# Patient Record
Sex: FEMALE | Race: BLACK OR AFRICAN AMERICAN | NOT HISPANIC OR LATINO
[De-identification: names, ages, dates, MRNs, and addresses within clinical notes are randomized per-mention and may not be internally consistent; named-entity substitution may affect disease eponyms.]

---

## 2017-03-29 ENCOUNTER — APPOINTMENT (OUTPATIENT)
Dept: FAMILY MEDICINE | Facility: CLINIC | Age: 82
End: 2017-03-29

## 2017-04-23 ENCOUNTER — RX RENEWAL (OUTPATIENT)
Age: 82
End: 2017-04-23

## 2017-07-17 ENCOUNTER — APPOINTMENT (OUTPATIENT)
Dept: FAMILY MEDICINE | Facility: CLINIC | Age: 82
End: 2017-07-17

## 2017-07-17 ENCOUNTER — MEDICATION RENEWAL (OUTPATIENT)
Age: 82
End: 2017-07-17

## 2017-07-24 ENCOUNTER — MEDICATION RENEWAL (OUTPATIENT)
Age: 82
End: 2017-07-24

## 2017-07-25 ENCOUNTER — RX RENEWAL (OUTPATIENT)
Age: 82
End: 2017-07-25

## 2017-08-01 ENCOUNTER — APPOINTMENT (OUTPATIENT)
Dept: FAMILY MEDICINE | Facility: CLINIC | Age: 82
End: 2017-08-01
Payer: MEDICARE

## 2017-08-01 VITALS
BODY MASS INDEX: 41.65 KG/M2 | WEIGHT: 250 LBS | HEIGHT: 65 IN | DIASTOLIC BLOOD PRESSURE: 74 MMHG | TEMPERATURE: 98.4 F | OXYGEN SATURATION: 93 % | SYSTOLIC BLOOD PRESSURE: 148 MMHG | HEART RATE: 65 BPM

## 2017-08-01 DIAGNOSIS — C50.911 MALIGNANT NEOPLASM OF UNSPECIFIED SITE OF RIGHT FEMALE BREAST: ICD-10-CM

## 2017-08-01 DIAGNOSIS — I10 ESSENTIAL (PRIMARY) HYPERTENSION: ICD-10-CM

## 2017-08-01 DIAGNOSIS — E11.9 TYPE 2 DIABETES MELLITUS W/OUT COMPLICATIONS: ICD-10-CM

## 2017-08-01 DIAGNOSIS — I51.9 HEART DISEASE, UNSPECIFIED: ICD-10-CM

## 2017-08-01 DIAGNOSIS — I25.10 ATHEROSCLEROTIC HEART DISEASE OF NATIVE CORONARY ARTERY W/OUT ANGINA PECTORIS: ICD-10-CM

## 2017-08-01 PROCEDURE — 99215 OFFICE O/P EST HI 40 MIN: CPT | Mod: 25

## 2017-08-01 PROCEDURE — 36415 COLL VENOUS BLD VENIPUNCTURE: CPT

## 2017-08-01 RX ORDER — ASPIRIN 325 MG/1
325 TABLET, FILM COATED ORAL
Qty: 90 | Refills: 3 | Status: ACTIVE | COMMUNITY
Start: 2017-08-01

## 2017-08-02 LAB
25(OH)D3 SERPL-MCNC: 10.6 NG/ML
ALBUMIN SERPL ELPH-MCNC: 4 G/DL
ALP BLD-CCNC: 67 U/L
ALT SERPL-CCNC: 16 U/L
ANION GAP SERPL CALC-SCNC: 17 MMOL/L
APPEARANCE: ABNORMAL
AST SERPL-CCNC: 30 U/L
BASOPHILS # BLD AUTO: 0.02 K/UL
BASOPHILS NFR BLD AUTO: 0.3 %
BILIRUB DIRECT SERPL-MCNC: 0.1 MG/DL
BILIRUB INDIRECT SERPL-MCNC: 0.2 MG/DL
BILIRUB SERPL-MCNC: 0.3 MG/DL
BILIRUBIN URINE: NEGATIVE
BLOOD URINE: NEGATIVE
BUN SERPL-MCNC: 13 MG/DL
CALCIUM SERPL-MCNC: 9.6 MG/DL
CHLORIDE SERPL-SCNC: 103 MMOL/L
CHOLEST SERPL-MCNC: 189 MG/DL
CHOLEST/HDLC SERPL: 4.8 RATIO
CO2 SERPL-SCNC: 21 MMOL/L
COLOR: YELLOW
CREAT SERPL-MCNC: 1.69 MG/DL
EOSINOPHIL # BLD AUTO: 0.09 K/UL
EOSINOPHIL NFR BLD AUTO: 1.3 %
FOLATE SERPL-MCNC: 14.9 NG/ML
GLUCOSE QUALITATIVE U: NORMAL MG/DL
GLUCOSE SERPL-MCNC: 107 MG/DL
HAV IGM SER QL: NONREACTIVE
HBA1C MFR BLD HPLC: 6.1 %
HBV CORE IGM SER QL: NONREACTIVE
HBV SURFACE AG SER QL: NONREACTIVE
HCT VFR BLD CALC: 37.9 %
HCV AB SER QL: NONREACTIVE
HCV S/CO RATIO: 0.1 S/CO
HDLC SERPL-MCNC: 39 MG/DL
HGB BLD-MCNC: 12.5 G/DL
IMM GRANULOCYTES NFR BLD AUTO: 0.1 %
KETONES URINE: NEGATIVE
LDLC SERPL CALC-MCNC: 104 MG/DL
LEUKOCYTE ESTERASE URINE: ABNORMAL
LYMPHOCYTES # BLD AUTO: 4.77 K/UL
LYMPHOCYTES NFR BLD AUTO: 68.7 %
MAN DIFF?: NORMAL
MCHC RBC-ENTMCNC: 29.3 PG
MCHC RBC-ENTMCNC: 33 GM/DL
MCV RBC AUTO: 88.8 FL
MONOCYTES # BLD AUTO: 0.45 K/UL
MONOCYTES NFR BLD AUTO: 6.5 %
NEUTROPHILS # BLD AUTO: 1.6 K/UL
NEUTROPHILS NFR BLD AUTO: 23.1 %
NITRITE URINE: NEGATIVE
NT-PROBNP SERPL-MCNC: 118 PG/ML
PH URINE: 5
PLATELET # BLD AUTO: 203 K/UL
POTASSIUM SERPL-SCNC: 4.5 MMOL/L
PROT SERPL-MCNC: 7.9 G/DL
PROTEIN URINE: NEGATIVE MG/DL
RBC # BLD: 4.27 M/UL
RBC # FLD: 14.6 %
SODIUM SERPL-SCNC: 141 MMOL/L
SPECIFIC GRAVITY URINE: 1.01
TRIGL SERPL-MCNC: 229 MG/DL
TSH SERPL-ACNC: 2.47 UIU/ML
URATE SERPL-MCNC: 9.1 MG/DL
UROBILINOGEN URINE: NORMAL MG/DL
VIT B12 SERPL-MCNC: 394 PG/ML
WBC # FLD AUTO: 6.94 K/UL

## 2017-09-20 ENCOUNTER — EMERGENCY (EMERGENCY)
Facility: HOSPITAL | Age: 82
LOS: 1 days | Discharge: HOME | End: 2017-09-20
Admitting: EMERGENCY MEDICINE
Payer: COMMERCIAL

## 2017-09-20 DIAGNOSIS — Z88.2 ALLERGY STATUS TO SULFONAMIDES: ICD-10-CM

## 2017-09-20 DIAGNOSIS — R32 UNSPECIFIED URINARY INCONTINENCE: ICD-10-CM

## 2017-09-20 DIAGNOSIS — E78.00 PURE HYPERCHOLESTEROLEMIA, UNSPECIFIED: ICD-10-CM

## 2017-09-20 DIAGNOSIS — E11.9 TYPE 2 DIABETES MELLITUS WITHOUT COMPLICATIONS: ICD-10-CM

## 2017-09-20 DIAGNOSIS — Z79.84 LONG TERM (CURRENT) USE OF ORAL HYPOGLYCEMIC DRUGS: ICD-10-CM

## 2017-09-20 DIAGNOSIS — Z79.899 OTHER LONG TERM (CURRENT) DRUG THERAPY: ICD-10-CM

## 2017-09-20 DIAGNOSIS — I10 ESSENTIAL (PRIMARY) HYPERTENSION: ICD-10-CM

## 2017-09-20 DIAGNOSIS — Z98.61 CORONARY ANGIOPLASTY STATUS: ICD-10-CM

## 2017-09-20 DIAGNOSIS — Z79.82 LONG TERM (CURRENT) USE OF ASPIRIN: ICD-10-CM

## 2017-09-20 PROCEDURE — 81003 URINALYSIS AUTO W/O SCOPE: CPT

## 2017-09-20 PROCEDURE — 36415 COLL VENOUS BLD VENIPUNCTURE: CPT

## 2017-09-20 PROCEDURE — 73630 X-RAY EXAM OF FOOT: CPT

## 2017-09-20 PROCEDURE — 85027 COMPLETE CBC AUTOMATED: CPT

## 2017-09-20 PROCEDURE — 80048 BASIC METABOLIC PNL TOTAL CA: CPT

## 2017-09-20 PROCEDURE — 99283 EMERGENCY DEPT VISIT LOW MDM: CPT | Mod: 25

## 2017-09-20 PROCEDURE — 87086 URINE CULTURE/COLONY COUNT: CPT

## 2017-09-20 PROCEDURE — 73630 X-RAY EXAM OF FOOT: CPT | Mod: 26,LT

## 2017-09-20 PROCEDURE — 51701 INSERT BLADDER CATHETER: CPT

## 2017-09-20 PROCEDURE — 80076 HEPATIC FUNCTION PANEL: CPT

## 2017-09-20 PROCEDURE — 99284 EMERGENCY DEPT VISIT MOD MDM: CPT

## 2017-11-29 ENCOUNTER — RX RENEWAL (OUTPATIENT)
Age: 82
End: 2017-11-29

## 2017-11-29 RX ORDER — CLOPIDOGREL BISULFATE 75 MG/1
75 TABLET, FILM COATED ORAL
Qty: 90 | Refills: 3 | Status: ACTIVE | COMMUNITY
Start: 2017-07-17 | End: 1900-01-01

## 2017-12-03 ENCOUNTER — EMERGENCY (EMERGENCY)
Facility: HOSPITAL | Age: 82
LOS: 1 days | Discharge: HOME | End: 2017-12-03
Admitting: EMERGENCY MEDICINE
Payer: COMMERCIAL

## 2017-12-03 DIAGNOSIS — Z79.899 OTHER LONG TERM (CURRENT) DRUG THERAPY: ICD-10-CM

## 2017-12-03 DIAGNOSIS — E78.00 PURE HYPERCHOLESTEROLEMIA, UNSPECIFIED: ICD-10-CM

## 2017-12-03 DIAGNOSIS — M19.011 PRIMARY OSTEOARTHRITIS, RIGHT SHOULDER: ICD-10-CM

## 2017-12-03 DIAGNOSIS — Z79.84 LONG TERM (CURRENT) USE OF ORAL HYPOGLYCEMIC DRUGS: ICD-10-CM

## 2017-12-03 DIAGNOSIS — Z95.5 PRESENCE OF CORONARY ANGIOPLASTY IMPLANT AND GRAFT: ICD-10-CM

## 2017-12-03 DIAGNOSIS — M25.511 PAIN IN RIGHT SHOULDER: ICD-10-CM

## 2017-12-03 DIAGNOSIS — Z88.2 ALLERGY STATUS TO SULFONAMIDES: ICD-10-CM

## 2017-12-03 DIAGNOSIS — E11.9 TYPE 2 DIABETES MELLITUS WITHOUT COMPLICATIONS: ICD-10-CM

## 2017-12-03 DIAGNOSIS — I10 ESSENTIAL (PRIMARY) HYPERTENSION: ICD-10-CM

## 2017-12-03 DIAGNOSIS — Z79.82 LONG TERM (CURRENT) USE OF ASPIRIN: ICD-10-CM

## 2017-12-03 PROCEDURE — 72040 X-RAY EXAM NECK SPINE 2-3 VW: CPT

## 2017-12-03 PROCEDURE — 99284 EMERGENCY DEPT VISIT MOD MDM: CPT | Mod: 25

## 2017-12-03 PROCEDURE — 73030 X-RAY EXAM OF SHOULDER: CPT

## 2017-12-03 PROCEDURE — 99284 EMERGENCY DEPT VISIT MOD MDM: CPT

## 2017-12-03 PROCEDURE — 73030 X-RAY EXAM OF SHOULDER: CPT | Mod: 26,RT

## 2017-12-03 PROCEDURE — 72040 X-RAY EXAM NECK SPINE 2-3 VW: CPT | Mod: 26

## 2017-12-15 ENCOUNTER — RX RENEWAL (OUTPATIENT)
Age: 82
End: 2017-12-15

## 2018-01-06 ENCOUNTER — EMERGENCY (EMERGENCY)
Facility: HOSPITAL | Age: 83
LOS: 1 days | Discharge: HOME | End: 2018-01-06
Admitting: EMERGENCY MEDICINE
Payer: COMMERCIAL

## 2018-01-06 DIAGNOSIS — Z79.82 LONG TERM (CURRENT) USE OF ASPIRIN: ICD-10-CM

## 2018-01-06 DIAGNOSIS — E78.00 PURE HYPERCHOLESTEROLEMIA, UNSPECIFIED: ICD-10-CM

## 2018-01-06 DIAGNOSIS — Z95.1 PRESENCE OF AORTOCORONARY BYPASS GRAFT: ICD-10-CM

## 2018-01-06 DIAGNOSIS — Z79.01 LONG TERM (CURRENT) USE OF ANTICOAGULANTS: ICD-10-CM

## 2018-01-06 DIAGNOSIS — E11.9 TYPE 2 DIABETES MELLITUS WITHOUT COMPLICATIONS: ICD-10-CM

## 2018-01-06 DIAGNOSIS — Z79.899 OTHER LONG TERM (CURRENT) DRUG THERAPY: ICD-10-CM

## 2018-01-06 DIAGNOSIS — I10 ESSENTIAL (PRIMARY) HYPERTENSION: ICD-10-CM

## 2018-01-06 DIAGNOSIS — Z79.84 LONG TERM (CURRENT) USE OF ORAL HYPOGLYCEMIC DRUGS: ICD-10-CM

## 2018-01-06 DIAGNOSIS — Z88.2 ALLERGY STATUS TO SULFONAMIDES: ICD-10-CM

## 2018-01-06 DIAGNOSIS — Z79.891 LONG TERM (CURRENT) USE OF OPIATE ANALGESIC: ICD-10-CM

## 2018-01-06 DIAGNOSIS — M54.2 CERVICALGIA: ICD-10-CM

## 2018-01-06 DIAGNOSIS — R51 HEADACHE: ICD-10-CM

## 2018-01-06 PROCEDURE — 70450 CT HEAD/BRAIN W/O DYE: CPT | Mod: 26

## 2018-01-06 PROCEDURE — 82962 GLUCOSE BLOOD TEST: CPT

## 2018-01-06 PROCEDURE — 99284 EMERGENCY DEPT VISIT MOD MDM: CPT | Mod: 25

## 2018-01-06 PROCEDURE — 99285 EMERGENCY DEPT VISIT HI MDM: CPT

## 2018-01-06 PROCEDURE — 72125 CT NECK SPINE W/O DYE: CPT | Mod: 26

## 2018-01-06 PROCEDURE — 72125 CT NECK SPINE W/O DYE: CPT

## 2018-01-06 PROCEDURE — 70450 CT HEAD/BRAIN W/O DYE: CPT

## 2018-01-24 ENCOUNTER — RX RENEWAL (OUTPATIENT)
Age: 83
End: 2018-01-24

## 2018-02-15 ENCOUNTER — APPOINTMENT (OUTPATIENT)
Dept: FAMILY MEDICINE | Facility: CLINIC | Age: 83
End: 2018-02-15

## 2018-04-04 ENCOUNTER — RX RENEWAL (OUTPATIENT)
Age: 83
End: 2018-04-04

## 2018-04-21 ENCOUNTER — INPATIENT (INPATIENT)
Facility: HOSPITAL | Age: 83
LOS: 0 days | Discharge: HOME | DRG: 554 | End: 2018-04-22
Attending: INTERNAL MEDICINE | Admitting: INTERNAL MEDICINE
Payer: COMMERCIAL

## 2018-04-21 DIAGNOSIS — E11.9 TYPE 2 DIABETES MELLITUS WITHOUT COMPLICATIONS: ICD-10-CM

## 2018-04-21 DIAGNOSIS — E78.5 HYPERLIPIDEMIA, UNSPECIFIED: ICD-10-CM

## 2018-04-21 DIAGNOSIS — M10.072 IDIOPATHIC GOUT, LEFT ANKLE AND FOOT: ICD-10-CM

## 2018-04-21 DIAGNOSIS — M10.9 GOUT, UNSPECIFIED: ICD-10-CM

## 2018-04-21 DIAGNOSIS — I10 ESSENTIAL (PRIMARY) HYPERTENSION: ICD-10-CM

## 2018-04-21 DIAGNOSIS — Z79.82 LONG TERM (CURRENT) USE OF ASPIRIN: ICD-10-CM

## 2018-04-21 DIAGNOSIS — Z79.84 LONG TERM (CURRENT) USE OF ORAL HYPOGLYCEMIC DRUGS: ICD-10-CM

## 2018-04-21 DIAGNOSIS — Z95.5 PRESENCE OF CORONARY ANGIOPLASTY IMPLANT AND GRAFT: ICD-10-CM

## 2018-04-21 DIAGNOSIS — Z88.2 ALLERGY STATUS TO SULFONAMIDES: ICD-10-CM

## 2018-04-21 DIAGNOSIS — Z86.711 PERSONAL HISTORY OF PULMONARY EMBOLISM: ICD-10-CM

## 2018-04-21 DIAGNOSIS — I25.10 ATHEROSCLEROTIC HEART DISEASE OF NATIVE CORONARY ARTERY WITHOUT ANGINA PECTORIS: ICD-10-CM

## 2018-04-22 DIAGNOSIS — M79.606 PAIN IN LEG, UNSPECIFIED: ICD-10-CM

## 2018-04-22 PROCEDURE — 99223 1ST HOSP IP/OBS HIGH 75: CPT

## 2018-04-22 PROCEDURE — 93970 EXTREMITY STUDY: CPT | Mod: 26

## 2018-04-22 PROCEDURE — 93010 ELECTROCARDIOGRAM REPORT: CPT

## 2018-04-22 PROCEDURE — 71045 X-RAY EXAM CHEST 1 VIEW: CPT | Mod: 26

## 2018-04-22 PROCEDURE — 70450 CT HEAD/BRAIN W/O DYE: CPT | Mod: 26

## 2018-04-23 PROCEDURE — 93970 EXTREMITY STUDY: CPT

## 2018-04-23 PROCEDURE — 93005 ELECTROCARDIOGRAM TRACING: CPT

## 2018-04-23 PROCEDURE — 80053 COMPREHEN METABOLIC PANEL: CPT

## 2018-04-23 PROCEDURE — 70450 CT HEAD/BRAIN W/O DYE: CPT

## 2018-04-23 PROCEDURE — 80048 BASIC METABOLIC PNL TOTAL CA: CPT

## 2018-04-23 PROCEDURE — 99285 EMERGENCY DEPT VISIT HI MDM: CPT | Mod: 25

## 2018-04-23 PROCEDURE — 96372 THER/PROPH/DIAG INJ SC/IM: CPT

## 2018-04-23 PROCEDURE — 85027 COMPLETE CBC AUTOMATED: CPT

## 2018-04-23 PROCEDURE — 84550 ASSAY OF BLOOD/URIC ACID: CPT

## 2018-04-23 PROCEDURE — 84484 ASSAY OF TROPONIN QUANT: CPT

## 2018-04-23 PROCEDURE — 83036 HEMOGLOBIN GLYCOSYLATED A1C: CPT

## 2018-04-23 PROCEDURE — 36415 COLL VENOUS BLD VENIPUNCTURE: CPT

## 2018-04-23 PROCEDURE — 71045 X-RAY EXAM CHEST 1 VIEW: CPT

## 2018-06-15 ENCOUNTER — RX RENEWAL (OUTPATIENT)
Age: 83
End: 2018-06-15

## 2018-06-16 ENCOUNTER — EMERGENCY (EMERGENCY)
Facility: HOSPITAL | Age: 83
LOS: 1 days | Discharge: HOME | End: 2018-06-16
Attending: INTERNAL MEDICINE | Admitting: EMERGENCY MEDICINE
Payer: COMMERCIAL

## 2018-06-16 PROCEDURE — 99283 EMERGENCY DEPT VISIT LOW MDM: CPT | Mod: 25

## 2018-06-16 PROCEDURE — 99283 EMERGENCY DEPT VISIT LOW MDM: CPT

## 2018-06-16 PROCEDURE — 74176 CT ABD & PELVIS W/O CONTRAST: CPT

## 2018-06-16 PROCEDURE — 74019 RADEX ABDOMEN 2 VIEWS: CPT | Mod: 26

## 2018-06-16 PROCEDURE — 74176 CT ABD & PELVIS W/O CONTRAST: CPT | Mod: 26

## 2018-06-16 NOTE — ED ADULT NURSE NOTE - CHPI ED SYMPTOMS NEG
no dysuria/no diarrhea/no burning urination/no blood in stool/no hematuria/no fever/no chills/no vomiting

## 2018-07-12 ENCOUNTER — RX RENEWAL (OUTPATIENT)
Age: 83
End: 2018-07-12

## 2018-07-22 ENCOUNTER — EMERGENCY (EMERGENCY)
Facility: HOSPITAL | Age: 83
LOS: 1 days | Discharge: HOME | End: 2018-07-22
Attending: EMERGENCY MEDICINE | Admitting: EMERGENCY MEDICINE
Payer: COMMERCIAL

## 2018-07-22 VITALS
RESPIRATION RATE: 16 BRPM | DIASTOLIC BLOOD PRESSURE: 79 MMHG | HEART RATE: 72 BPM | TEMPERATURE: 98 F | OXYGEN SATURATION: 97 % | SYSTOLIC BLOOD PRESSURE: 165 MMHG

## 2018-07-22 VITALS
SYSTOLIC BLOOD PRESSURE: 163 MMHG | HEART RATE: 69 BPM | RESPIRATION RATE: 16 BRPM | OXYGEN SATURATION: 97 % | DIASTOLIC BLOOD PRESSURE: 82 MMHG

## 2018-07-22 LAB
APPEARANCE UR: CLEAR — SIGNIFICANT CHANGE UP
BILIRUB UR-MCNC: NEGATIVE — SIGNIFICANT CHANGE UP
COLOR SPEC: YELLOW — SIGNIFICANT CHANGE UP
DIFF PNL FLD: NEGATIVE — SIGNIFICANT CHANGE UP
GLUCOSE UR QL: NEGATIVE — SIGNIFICANT CHANGE UP
KETONES UR-MCNC: NEGATIVE — SIGNIFICANT CHANGE UP
LEUKOCYTE ESTERASE UR-ACNC: NEGATIVE — SIGNIFICANT CHANGE UP
NITRITE UR-MCNC: NEGATIVE — SIGNIFICANT CHANGE UP
PH UR: 7 — SIGNIFICANT CHANGE UP (ref 5–8)
PROT UR-MCNC: NEGATIVE — SIGNIFICANT CHANGE UP
SP GR SPEC: 1 — LOW (ref 1.01–1.02)
UROBILINOGEN FLD QL: NEGATIVE — SIGNIFICANT CHANGE UP

## 2018-07-22 PROCEDURE — 74019 RADEX ABDOMEN 2 VIEWS: CPT

## 2018-07-22 PROCEDURE — 99283 EMERGENCY DEPT VISIT LOW MDM: CPT | Mod: 25

## 2018-07-22 PROCEDURE — 74019 RADEX ABDOMEN 2 VIEWS: CPT | Mod: 26

## 2018-07-22 PROCEDURE — 87086 URINE CULTURE/COLONY COUNT: CPT

## 2018-07-22 PROCEDURE — 99283 EMERGENCY DEPT VISIT LOW MDM: CPT

## 2018-07-22 RX ORDER — ACETAMINOPHEN 500 MG
650 TABLET ORAL ONCE
Qty: 0 | Refills: 0 | Status: COMPLETED | OUTPATIENT
Start: 2018-07-22 | End: 2018-07-22

## 2018-07-22 RX ADMIN — Medication 650 MILLIGRAM(S): at 09:46

## 2018-07-22 RX ADMIN — Medication 1 ENEMA: at 06:40

## 2018-07-22 NOTE — ED PROVIDER NOTE - OBJECTIVE STATEMENT
82F h/o DM, HTN, HLD TORREY p/w constipation and obstipation x 3 days, no abdo pain, no nausea or vomiting, no fevers or chills. No dysuria or hematuria or frequency but states "urine felt warm." States has been taking some stool softeners and has had constipation before.

## 2018-07-22 NOTE — ED ADULT NURSE NOTE - OBJECTIVE STATEMENT
Patient comes in stating she is constipated, states it has been two days since her last BM and then states It has been 3-4 days since last BM. She states it becomes worse when she attempts to move her bowels. Patient denies n/v and abd pain.

## 2018-07-22 NOTE — ED ADULT NURSE NOTE - ADDITIONAL PRINTED INSTRUCTIONS GIVEN
Patient advised to rest, increase dietary fiber and water. Follow up with PMD, return for any concerns or worsening symptoms.

## 2018-07-22 NOTE — ED ADULT NURSE NOTE - PMH
CAD (coronary artery disease)    DM (diabetes mellitus)    HLD (hyperlipidemia)    Hypertension    MI (myocardial infarction)    Obesity    Pulmonary embolism    Rheumatoid arthritis    Thyroid disease

## 2018-07-22 NOTE — ED ADULT NURSE REASSESSMENT NOTE - NS ED NURSE REASSESS COMMENT FT1
Patient had large BD X's one with urine out put in the commode. Will obtain urine for UA later as patient reports not having to urinate at this time. Patient had large BM X's one with urine out put in the commode ( urine contaminated with Feces). Will obtain urine for UA later as patient reports not having to urinate at this time. Patient states she has relief of her abdominal pain post enema and BM.

## 2018-07-22 NOTE — ED PROVIDER NOTE - MEDICAL DECISION MAKING DETAILS
constipation with benign abdo exam, will get abdo xray given surgical hx r/o obvious SBO, fleet's enema, UA and UCx, reassess

## 2018-07-23 LAB
CULTURE RESULTS: NO GROWTH — SIGNIFICANT CHANGE UP
SPECIMEN SOURCE: SIGNIFICANT CHANGE UP

## 2018-09-10 ENCOUNTER — APPOINTMENT (OUTPATIENT)
Dept: FAMILY MEDICINE | Facility: HOSPITAL | Age: 83
End: 2018-09-10

## 2018-09-12 ENCOUNTER — EMERGENCY (EMERGENCY)
Facility: HOSPITAL | Age: 83
LOS: 1 days | Discharge: HOME | End: 2018-09-12
Attending: EMERGENCY MEDICINE | Admitting: HOSPITALIST
Payer: COMMERCIAL

## 2018-09-12 VITALS
OXYGEN SATURATION: 100 % | RESPIRATION RATE: 14 BRPM | DIASTOLIC BLOOD PRESSURE: 90 MMHG | TEMPERATURE: 99 F | SYSTOLIC BLOOD PRESSURE: 146 MMHG | HEART RATE: 62 BPM

## 2018-09-12 DIAGNOSIS — I25.10 ATHEROSCLEROTIC HEART DISEASE OF NATIVE CORONARY ARTERY WITHOUT ANGINA PECTORIS: ICD-10-CM

## 2018-09-12 DIAGNOSIS — E11.8 TYPE 2 DIABETES MELLITUS WITH UNSPECIFIED COMPLICATIONS: ICD-10-CM

## 2018-09-12 DIAGNOSIS — M79.671 PAIN IN RIGHT FOOT: ICD-10-CM

## 2018-09-12 DIAGNOSIS — R07.9 CHEST PAIN, UNSPECIFIED: ICD-10-CM

## 2018-09-12 LAB
ALBUMIN SERPL ELPH-MCNC: 3.1 G/DL — LOW (ref 3.3–5)
ALP SERPL-CCNC: 90 U/L — SIGNIFICANT CHANGE UP (ref 40–120)
ALT FLD-CCNC: 14 U/L DA — SIGNIFICANT CHANGE UP (ref 10–45)
ANION GAP SERPL CALC-SCNC: 10 MMOL/L — SIGNIFICANT CHANGE UP (ref 5–17)
ANION GAP SERPL CALC-SCNC: 9 MMOL/L — SIGNIFICANT CHANGE UP (ref 5–17)
APTT BLD: 31.9 SEC — SIGNIFICANT CHANGE UP (ref 27.5–37.4)
AST SERPL-CCNC: 20 U/L — SIGNIFICANT CHANGE UP (ref 10–40)
BASOPHILS # BLD AUTO: 0.1 K/UL — SIGNIFICANT CHANGE UP (ref 0–0.2)
BASOPHILS NFR BLD AUTO: 0.8 % — SIGNIFICANT CHANGE UP (ref 0–2)
BILIRUB SERPL-MCNC: 0.6 MG/DL — SIGNIFICANT CHANGE UP (ref 0.2–1.2)
BUN SERPL-MCNC: 10 MG/DL — SIGNIFICANT CHANGE UP (ref 7–23)
BUN SERPL-MCNC: 7 MG/DL — SIGNIFICANT CHANGE UP (ref 7–23)
CALCIUM SERPL-MCNC: 9.3 MG/DL — SIGNIFICANT CHANGE UP (ref 8.4–10.5)
CALCIUM SERPL-MCNC: 9.3 MG/DL — SIGNIFICANT CHANGE UP (ref 8.4–10.5)
CHLORIDE SERPL-SCNC: 106 MMOL/L — SIGNIFICANT CHANGE UP (ref 96–108)
CHLORIDE SERPL-SCNC: 107 MMOL/L — SIGNIFICANT CHANGE UP (ref 96–108)
CK MB CFR SERPL CALC: <0.5 NG/ML — LOW (ref 0.5–10)
CK SERPL-CCNC: 89 U/L — SIGNIFICANT CHANGE UP (ref 25–170)
CO2 SERPL-SCNC: 27 MMOL/L — SIGNIFICANT CHANGE UP (ref 22–31)
CO2 SERPL-SCNC: 29 MMOL/L — SIGNIFICANT CHANGE UP (ref 22–31)
CREAT SERPL-MCNC: 1.22 MG/DL — SIGNIFICANT CHANGE UP (ref 0.5–1.3)
CREAT SERPL-MCNC: 1.33 MG/DL — HIGH (ref 0.5–1.3)
D DIMER BLD IA.RAPID-MCNC: 282 NG/ML DDU — HIGH
EOSINOPHIL # BLD AUTO: 0.1 K/UL — SIGNIFICANT CHANGE UP (ref 0–0.5)
EOSINOPHIL NFR BLD AUTO: 0.9 % — SIGNIFICANT CHANGE UP (ref 0–6)
GLUCOSE BLDC GLUCOMTR-MCNC: 102 MG/DL — HIGH (ref 70–99)
GLUCOSE BLDC GLUCOMTR-MCNC: 182 MG/DL — HIGH (ref 70–99)
GLUCOSE BLDC GLUCOMTR-MCNC: 255 MG/DL — HIGH (ref 70–99)
GLUCOSE SERPL-MCNC: 131 MG/DL — HIGH (ref 70–99)
GLUCOSE SERPL-MCNC: 173 MG/DL — HIGH (ref 70–99)
HCT VFR BLD CALC: 38.4 % — SIGNIFICANT CHANGE UP (ref 34.5–45)
HCT VFR BLD CALC: 39.4 % — SIGNIFICANT CHANGE UP (ref 34.5–45)
HGB BLD-MCNC: 12.4 G/DL — SIGNIFICANT CHANGE UP (ref 11.5–15.5)
HGB BLD-MCNC: 13.2 G/DL — SIGNIFICANT CHANGE UP (ref 11.5–15.5)
INR BLD: 1.15 RATIO — SIGNIFICANT CHANGE UP (ref 0.88–1.16)
LYMPHOCYTES # BLD AUTO: 3.9 K/UL — HIGH (ref 1–3.3)
LYMPHOCYTES # BLD AUTO: 51.2 % — HIGH (ref 13–44)
MAGNESIUM SERPL-MCNC: 2 MG/DL — SIGNIFICANT CHANGE UP (ref 1.6–2.6)
MCHC RBC-ENTMCNC: 29.6 PG — SIGNIFICANT CHANGE UP (ref 27–34)
MCHC RBC-ENTMCNC: 30.9 PG — SIGNIFICANT CHANGE UP (ref 27–34)
MCHC RBC-ENTMCNC: 32.3 GM/DL — SIGNIFICANT CHANGE UP (ref 32–36)
MCHC RBC-ENTMCNC: 33.6 GM/DL — SIGNIFICANT CHANGE UP (ref 32–36)
MCV RBC AUTO: 91.6 FL — SIGNIFICANT CHANGE UP (ref 80–100)
MCV RBC AUTO: 91.9 FL — SIGNIFICANT CHANGE UP (ref 80–100)
MONOCYTES # BLD AUTO: 0.6 K/UL — SIGNIFICANT CHANGE UP (ref 0–0.9)
MONOCYTES NFR BLD AUTO: 8.3 % — SIGNIFICANT CHANGE UP (ref 2–14)
NEUTROPHILS # BLD AUTO: 2.9 K/UL — SIGNIFICANT CHANGE UP (ref 1.8–7.4)
NEUTROPHILS NFR BLD AUTO: 38.8 % — LOW (ref 43–77)
NT-PROBNP SERPL-SCNC: 377 PG/ML — HIGH (ref 0–300)
PLATELET # BLD AUTO: 186 K/UL — SIGNIFICANT CHANGE UP (ref 150–400)
PLATELET # BLD AUTO: 186 K/UL — SIGNIFICANT CHANGE UP (ref 150–400)
POTASSIUM SERPL-MCNC: 3 MMOL/L — LOW (ref 3.5–5.3)
POTASSIUM SERPL-MCNC: 3.2 MMOL/L — LOW (ref 3.5–5.3)
POTASSIUM SERPL-MCNC: 3.6 MMOL/L — SIGNIFICANT CHANGE UP (ref 3.5–5.3)
POTASSIUM SERPL-SCNC: 3 MMOL/L — LOW (ref 3.5–5.3)
POTASSIUM SERPL-SCNC: 3.2 MMOL/L — LOW (ref 3.5–5.3)
POTASSIUM SERPL-SCNC: 3.6 MMOL/L — SIGNIFICANT CHANGE UP (ref 3.5–5.3)
PROT SERPL-MCNC: 8.1 G/DL — SIGNIFICANT CHANGE UP (ref 6–8.3)
PROTHROM AB SERPL-ACNC: 12.8 SEC — HIGH (ref 9.8–12.7)
RBC # BLD: 4.18 M/UL — SIGNIFICANT CHANGE UP (ref 3.8–5.2)
RBC # BLD: 4.29 M/UL — SIGNIFICANT CHANGE UP (ref 3.8–5.2)
RBC # FLD: 13.3 % — SIGNIFICANT CHANGE UP (ref 10.3–14.5)
RBC # FLD: 13.4 % — SIGNIFICANT CHANGE UP (ref 10.3–14.5)
SODIUM SERPL-SCNC: 143 MMOL/L — SIGNIFICANT CHANGE UP (ref 135–145)
SODIUM SERPL-SCNC: 145 MMOL/L — SIGNIFICANT CHANGE UP (ref 135–145)
TROPONIN I SERPL-MCNC: <.017 NG/ML — LOW (ref 0.02–0.06)
TROPONIN I SERPL-MCNC: <.017 NG/ML — LOW (ref 0.02–0.06)
URATE SERPL-MCNC: 5.8 MG/DL — SIGNIFICANT CHANGE UP (ref 2.5–7)
WBC # BLD: 5.8 K/UL — SIGNIFICANT CHANGE UP (ref 3.8–10.5)
WBC # BLD: 7.6 K/UL — SIGNIFICANT CHANGE UP (ref 3.8–10.5)
WBC # FLD AUTO: 5.8 K/UL — SIGNIFICANT CHANGE UP (ref 3.8–10.5)
WBC # FLD AUTO: 7.6 K/UL — SIGNIFICANT CHANGE UP (ref 3.8–10.5)

## 2018-09-12 PROCEDURE — 93010 ELECTROCARDIOGRAM REPORT: CPT

## 2018-09-12 PROCEDURE — 99284 EMERGENCY DEPT VISIT MOD MDM: CPT | Mod: 25

## 2018-09-12 PROCEDURE — 99220: CPT

## 2018-09-12 PROCEDURE — 73630 X-RAY EXAM OF FOOT: CPT | Mod: 26,RT

## 2018-09-12 PROCEDURE — 73610 X-RAY EXAM OF ANKLE: CPT | Mod: 26,RT

## 2018-09-12 PROCEDURE — 71275 CT ANGIOGRAPHY CHEST: CPT | Mod: 26

## 2018-09-12 PROCEDURE — 93971 EXTREMITY STUDY: CPT | Mod: 26,RT

## 2018-09-12 PROCEDURE — 93306 TTE W/DOPPLER COMPLETE: CPT | Mod: 26

## 2018-09-12 RX ORDER — CLOPIDOGREL BISULFATE 75 MG/1
75 TABLET, FILM COATED ORAL DAILY
Qty: 0 | Refills: 0 | Status: DISCONTINUED | OUTPATIENT
Start: 2018-09-12 | End: 2018-09-16

## 2018-09-12 RX ORDER — DEXTROSE 50 % IN WATER 50 %
25 SYRINGE (ML) INTRAVENOUS ONCE
Qty: 0 | Refills: 0 | Status: DISCONTINUED | OUTPATIENT
Start: 2018-09-12 | End: 2018-09-16

## 2018-09-12 RX ORDER — INSULIN LISPRO 100/ML
VIAL (ML) SUBCUTANEOUS
Qty: 0 | Refills: 0 | Status: DISCONTINUED | OUTPATIENT
Start: 2018-09-12 | End: 2018-09-16

## 2018-09-12 RX ORDER — DEXTROSE 50 % IN WATER 50 %
12.5 SYRINGE (ML) INTRAVENOUS ONCE
Qty: 0 | Refills: 0 | Status: DISCONTINUED | OUTPATIENT
Start: 2018-09-12 | End: 2018-09-16

## 2018-09-12 RX ORDER — ENOXAPARIN SODIUM 100 MG/ML
40 INJECTION SUBCUTANEOUS EVERY 24 HOURS
Qty: 0 | Refills: 0 | Status: DISCONTINUED | OUTPATIENT
Start: 2018-09-12 | End: 2018-09-16

## 2018-09-12 RX ORDER — ASPIRIN/CALCIUM CARB/MAGNESIUM 324 MG
325 TABLET ORAL DAILY
Qty: 0 | Refills: 0 | Status: DISCONTINUED | OUTPATIENT
Start: 2018-09-12 | End: 2018-09-16

## 2018-09-12 RX ORDER — TRAMADOL HYDROCHLORIDE 50 MG/1
50 TABLET ORAL THREE TIMES A DAY
Qty: 0 | Refills: 0 | Status: DISCONTINUED | OUTPATIENT
Start: 2018-09-12 | End: 2018-09-12

## 2018-09-12 RX ORDER — INSULIN LISPRO 100/ML
VIAL (ML) SUBCUTANEOUS AT BEDTIME
Qty: 0 | Refills: 0 | Status: DISCONTINUED | OUTPATIENT
Start: 2018-09-12 | End: 2018-09-16

## 2018-09-12 RX ORDER — SIMVASTATIN 20 MG/1
20 TABLET, FILM COATED ORAL AT BEDTIME
Qty: 0 | Refills: 0 | Status: DISCONTINUED | OUTPATIENT
Start: 2018-09-12 | End: 2018-09-12

## 2018-09-12 RX ORDER — METFORMIN HYDROCHLORIDE 850 MG/1
500 TABLET ORAL
Qty: 0 | Refills: 0 | Status: DISCONTINUED | OUTPATIENT
Start: 2018-09-12 | End: 2018-09-12

## 2018-09-12 RX ORDER — METOPROLOL TARTRATE 50 MG
25 TABLET ORAL
Qty: 0 | Refills: 0 | Status: DISCONTINUED | OUTPATIENT
Start: 2018-09-12 | End: 2018-09-16

## 2018-09-12 RX ORDER — SODIUM CHLORIDE 9 MG/ML
1000 INJECTION, SOLUTION INTRAVENOUS
Qty: 0 | Refills: 0 | Status: DISCONTINUED | OUTPATIENT
Start: 2018-09-12 | End: 2018-09-16

## 2018-09-12 RX ORDER — ACETAMINOPHEN 500 MG
650 TABLET ORAL EVERY 6 HOURS
Qty: 0 | Refills: 0 | Status: DISCONTINUED | OUTPATIENT
Start: 2018-09-12 | End: 2018-09-16

## 2018-09-12 RX ORDER — ATORVASTATIN CALCIUM 80 MG/1
10 TABLET, FILM COATED ORAL AT BEDTIME
Qty: 0 | Refills: 0 | Status: DISCONTINUED | OUTPATIENT
Start: 2018-09-12 | End: 2018-09-16

## 2018-09-12 RX ORDER — ACETAMINOPHEN 500 MG
650 TABLET ORAL ONCE
Qty: 0 | Refills: 0 | Status: COMPLETED | OUTPATIENT
Start: 2018-09-12 | End: 2018-09-12

## 2018-09-12 RX ORDER — DEXTROSE 50 % IN WATER 50 %
15 SYRINGE (ML) INTRAVENOUS ONCE
Qty: 0 | Refills: 0 | Status: DISCONTINUED | OUTPATIENT
Start: 2018-09-12 | End: 2018-09-16

## 2018-09-12 RX ORDER — DOCUSATE SODIUM 100 MG
200 CAPSULE ORAL DAILY
Qty: 0 | Refills: 0 | Status: DISCONTINUED | OUTPATIENT
Start: 2018-09-12 | End: 2018-09-16

## 2018-09-12 RX ORDER — SENNA PLUS 8.6 MG/1
2 TABLET ORAL AT BEDTIME
Qty: 0 | Refills: 0 | Status: DISCONTINUED | OUTPATIENT
Start: 2018-09-12 | End: 2018-09-16

## 2018-09-12 RX ORDER — PANTOPRAZOLE SODIUM 20 MG/1
40 TABLET, DELAYED RELEASE ORAL
Qty: 0 | Refills: 0 | Status: DISCONTINUED | OUTPATIENT
Start: 2018-09-12 | End: 2018-09-16

## 2018-09-12 RX ORDER — POTASSIUM CHLORIDE 20 MEQ
40 PACKET (EA) ORAL ONCE
Qty: 0 | Refills: 0 | Status: COMPLETED | OUTPATIENT
Start: 2018-09-12 | End: 2018-09-12

## 2018-09-12 RX ORDER — LISINOPRIL 2.5 MG/1
2.5 TABLET ORAL DAILY
Qty: 0 | Refills: 0 | Status: DISCONTINUED | OUTPATIENT
Start: 2018-09-12 | End: 2018-09-16

## 2018-09-12 RX ORDER — GLUCAGON INJECTION, SOLUTION 0.5 MG/.1ML
1 INJECTION, SOLUTION SUBCUTANEOUS ONCE
Qty: 0 | Refills: 0 | Status: DISCONTINUED | OUTPATIENT
Start: 2018-09-12 | End: 2018-09-16

## 2018-09-12 RX ORDER — SODIUM CHLORIDE 9 MG/ML
3 INJECTION INTRAMUSCULAR; INTRAVENOUS; SUBCUTANEOUS ONCE
Qty: 0 | Refills: 0 | Status: COMPLETED | OUTPATIENT
Start: 2018-09-12 | End: 2018-09-12

## 2018-09-12 RX ORDER — KETOROLAC TROMETHAMINE 30 MG/ML
15 SYRINGE (ML) INJECTION ONCE
Qty: 0 | Refills: 0 | Status: DISCONTINUED | OUTPATIENT
Start: 2018-09-12 | End: 2018-09-12

## 2018-09-12 RX ORDER — SODIUM CHLORIDE 9 MG/ML
500 INJECTION INTRAMUSCULAR; INTRAVENOUS; SUBCUTANEOUS ONCE
Qty: 0 | Refills: 0 | Status: COMPLETED | OUTPATIENT
Start: 2018-09-12 | End: 2018-09-12

## 2018-09-12 RX ORDER — COLCHICINE 0.6 MG
0.6 TABLET ORAL
Qty: 0 | Refills: 0 | Status: DISCONTINUED | OUTPATIENT
Start: 2018-09-12 | End: 2018-09-16

## 2018-09-12 RX ADMIN — SODIUM CHLORIDE 3 MILLILITER(S): 9 INJECTION INTRAMUSCULAR; INTRAVENOUS; SUBCUTANEOUS at 04:10

## 2018-09-12 RX ADMIN — PANTOPRAZOLE SODIUM 40 MILLIGRAM(S): 20 TABLET, DELAYED RELEASE ORAL at 08:01

## 2018-09-12 RX ADMIN — Medication 15 MILLIGRAM(S): at 05:55

## 2018-09-12 RX ADMIN — SODIUM CHLORIDE 1000 MILLILITER(S): 9 INJECTION INTRAMUSCULAR; INTRAVENOUS; SUBCUTANEOUS at 04:11

## 2018-09-12 RX ADMIN — Medication 2: at 12:10

## 2018-09-12 RX ADMIN — Medication 0: at 08:05

## 2018-09-12 RX ADMIN — Medication 0.6 MILLIGRAM(S): at 17:56

## 2018-09-12 RX ADMIN — Medication 30 MILLIGRAM(S): at 08:01

## 2018-09-12 RX ADMIN — CLOPIDOGREL BISULFATE 75 MILLIGRAM(S): 75 TABLET, FILM COATED ORAL at 15:00

## 2018-09-12 RX ADMIN — Medication 40 MILLIEQUIVALENT(S): at 11:49

## 2018-09-12 RX ADMIN — ENOXAPARIN SODIUM 40 MILLIGRAM(S): 100 INJECTION SUBCUTANEOUS at 08:09

## 2018-09-12 RX ADMIN — SODIUM CHLORIDE 500 MILLILITER(S): 9 INJECTION INTRAMUSCULAR; INTRAVENOUS; SUBCUTANEOUS at 04:36

## 2018-09-12 RX ADMIN — TRAMADOL HYDROCHLORIDE 50 MILLIGRAM(S): 50 TABLET ORAL at 12:17

## 2018-09-12 RX ADMIN — TRAMADOL HYDROCHLORIDE 50 MILLIGRAM(S): 50 TABLET ORAL at 13:01

## 2018-09-12 RX ADMIN — Medication 650 MILLIGRAM(S): at 04:40

## 2018-09-12 RX ADMIN — Medication 15 MILLIGRAM(S): at 06:25

## 2018-09-12 RX ADMIN — Medication 325 MILLIGRAM(S): at 15:00

## 2018-09-12 RX ADMIN — Medication 20 MILLIGRAM(S): at 08:01

## 2018-09-12 RX ADMIN — Medication 200 MILLIGRAM(S): at 15:00

## 2018-09-12 RX ADMIN — Medication 25 MILLIGRAM(S): at 17:56

## 2018-09-12 RX ADMIN — LISINOPRIL 2.5 MILLIGRAM(S): 2.5 TABLET ORAL at 15:00

## 2018-09-12 RX ADMIN — Medication 40 MILLIEQUIVALENT(S): at 04:42

## 2018-09-12 RX ADMIN — Medication 4: at 17:57

## 2018-09-12 RX ADMIN — Medication 650 MILLIGRAM(S): at 04:10

## 2018-09-12 NOTE — H&P ADULT - HISTORY OF PRESENT ILLNESS
pt c/o R foot pain, severe, sharp, entire foot, for last 3 days, unable to bear weight due to pain, worse c walking. no trauma. has h/o rheumatoid arthritis.  states she had L sided chest pain, sharp for much of the day 2 days ago, which was relieved c nitroglycerin. no sob. no fever. has h/o PE in past 83 y/o female with HTN pt c/o R foot pain, severe, sharp, entire foot, for last 3 days, unable to bear weight due to pain, worse c walking. no trauma. has h/o rheumatoid arthritis.  states she had L sided chest pain, sharp for much of the day 2 days ago, which was relieved c nitroglycerin. no sob. no fever. has h/o PE in past 81 y/o female with HTN, RA, DM, brought in by EMS, because of severe R foot and ankle pain, unable to stand and go to the bathroom.  She states pain started about 2 days agopain, severe, sharp, entire foot, for last 3 days, unable to bear weight due to pain, worse c walking. no trauma. has h/o rheumatoid arthritis.  states she had L sided chest pain, sharp for much of the day 2 days ago, which was relieved c nitroglycerin. no sob. no fever. has h/o PE in past 81 y/o female with HTN, CAD, DM, RA, Obesity, brought in by EMS, because of severe R foot and ankle pain, unable to stand and go to the bathroom.  She states pain started about 2 days ago but got severe today.  She was able to walk until last night and today, unable to stand on right foot.  No hx of trauma, denies fever, cough.  She states she has constipation and has not had BM x 3 days, also c/o chest pain yesterday, relieved with nitro.  She had hx of PE in the past, noted to have elev D Dimer today, CT Angio neg for PE.  Pt is not a good historian.

## 2018-09-12 NOTE — ED PROVIDER NOTE - MUSCULOSKELETAL, MLM
Spine appears normal, range of motion is not limited,  R knee/lower leg normal, no swelling.  R ankle c mild generalized swelling and bilat ttp, pain c ROM.  R foot c generalized mild dorsal nonpitting edema c mild increased warmth and slight ttp.  2+ DP pulse. nl distal sensation c nl motor fx toes

## 2018-09-12 NOTE — H&P ADULT - NSHPPHYSICALEXAM_GEN_ALL_CORE
Vital Signs (24 Hrs):  T(C): 37.3 (09-12-18 @ 03:05), Max: 37.3 (09-12-18 @ 03:05)  HR: 53 (09-12-18 @ 05:55) (53 - 62)  BP: 133/58 (09-12-18 @ 05:55) (133/58 - 146/90)  RR: 15 (09-12-18 @ 05:55) (14 - 15)  SpO2: 98% (09-12-18 @ 05:55) (98% - 100%)

## 2018-09-12 NOTE — ED ADULT NURSE NOTE - NSIMPLEMENTINTERV_GEN_ALL_ED
Implemented All Universal Safety Interventions:  [place] to call system. Call bell, personal items and telephone within reach. Instruct patient to call for assistance. Room bathroom lighting operational. Non-slip footwear when patient is off stretcher. Physically safe environment: no spills, clutter or unnecessary equipment. Stretcher in lowest position, wheels locked, appropriate side rails in place.

## 2018-09-12 NOTE — ED PROVIDER NOTE - OBJECTIVE STATEMENT
pt c/o R foot pain, severe, sharp, entire foot, for last 3 days, unable to bear weight due to pain, worse c walking. no trauma. has h/o rheumatoid arthritis.  states she had L sided chest pain, sharp for much of the day 2 days ago, which was relieved c nitroglycerin. no sob. no fever. has h/o PE in past

## 2018-09-12 NOTE — ED PROVIDER NOTE - PROGRESS NOTE DETAILS
pt c some improvement in pain of foot, but still unable to bear weight, still c signif pain c attempted ROM ankle/foot. also will need further eval of chest pain and doppler RLE r/o dvt. d/w Dr Smith. will admit pt to OBS

## 2018-09-12 NOTE — H&P ADULT - NEUROLOGICAL DETAILS
responds to pain/no spontaneous movement/alert and oriented x 3/sensation intact/deep reflexes intact/cranial nerves intact/responds to verbal commands

## 2018-09-12 NOTE — ED ADULT NURSE NOTE - OBJECTIVE STATEMENT
Patient complaining of severe right foot pain 10/10, worsening over three days. Patient denies any injury to the foot, she also states that 2-3 days ago she had an episode of chest pain for which she took nitroglycerin which provided relief.

## 2018-09-12 NOTE — H&P ADULT - GASTROINTESTINAL DETAILS
soft/no masses palpable/no guarding/no distention/no rebound tenderness/bowel sounds normal/no bruit/nontender

## 2018-09-12 NOTE — ED PROVIDER NOTE - MEDICAL DECISION MAKING DETAILS
R foot/ankle pain/swelling c chest pain.  h/o PE.  partial ddx: arthritis flare, dvt/PE, acs. check xray  r/o fx. check labs, ekg.  will cta chest r/o pe.  reassess

## 2018-09-12 NOTE — H&P ADULT - ASSESSMENT
As above, 81 y/o female with HTN, CAD, DM, RA, Obesity, brought in by EMS, because of severe R foot and ankle pain, unable to stand As above, 83 y/o female with HTN, CAD, DM, RA, Obesity, brought in by EMS, because of severe R foot and ankle pain, now unable to stand or ambulate, ?arthritis flare, ?gout  Episode of chest pain, hx of CAD, now chest pain free.  EKG nml    Observation  Pain meds prn, will start short course of steroids, Colchicine  Replete K  FFup toponin, K  Cont other meds  FFup FS, cont Metformin, Sliding Scale  Will get leg dopplers r/o DVT  GI/DVT Prohylaxis  PT Eval    CAPRINI SCORE [CLOT]               [x ] Bed rest                                                        (1 Point)  [x ] Age= 75 years                                              (3 Points)                 [ x] BMI > 25 Kg/m2                                            (1 Point)                       [ x] Prior episodes of VTE                                     (3 Points)                  Total Score [   8  ]

## 2018-09-13 ENCOUNTER — TRANSCRIPTION ENCOUNTER (OUTPATIENT)
Age: 83
End: 2018-09-13

## 2018-09-13 VITALS
DIASTOLIC BLOOD PRESSURE: 68 MMHG | TEMPERATURE: 99 F | RESPIRATION RATE: 14 BRPM | SYSTOLIC BLOOD PRESSURE: 140 MMHG | HEART RATE: 68 BPM | OXYGEN SATURATION: 100 %

## 2018-09-13 LAB
GLUCOSE BLDC GLUCOMTR-MCNC: 115 MG/DL — HIGH (ref 70–99)
HBA1C BLD-MCNC: 6 % — HIGH (ref 4–5.6)

## 2018-09-13 PROCEDURE — 80048 BASIC METABOLIC PNL TOTAL CA: CPT

## 2018-09-13 PROCEDURE — 73630 X-RAY EXAM OF FOOT: CPT

## 2018-09-13 PROCEDURE — 83735 ASSAY OF MAGNESIUM: CPT

## 2018-09-13 PROCEDURE — 97162 PT EVAL MOD COMPLEX 30 MIN: CPT

## 2018-09-13 PROCEDURE — 83036 HEMOGLOBIN GLYCOSYLATED A1C: CPT

## 2018-09-13 PROCEDURE — 85610 PROTHROMBIN TIME: CPT

## 2018-09-13 PROCEDURE — 71275 CT ANGIOGRAPHY CHEST: CPT

## 2018-09-13 PROCEDURE — G0378: CPT

## 2018-09-13 PROCEDURE — G8978: CPT | Mod: CM

## 2018-09-13 PROCEDURE — 85379 FIBRIN DEGRADATION QUANT: CPT

## 2018-09-13 PROCEDURE — 99284 EMERGENCY DEPT VISIT MOD MDM: CPT | Mod: 25

## 2018-09-13 PROCEDURE — G8980: CPT | Mod: CM

## 2018-09-13 PROCEDURE — 82550 ASSAY OF CK (CPK): CPT

## 2018-09-13 PROCEDURE — 84132 ASSAY OF SERUM POTASSIUM: CPT

## 2018-09-13 PROCEDURE — 93005 ELECTROCARDIOGRAM TRACING: CPT

## 2018-09-13 PROCEDURE — 85730 THROMBOPLASTIN TIME PARTIAL: CPT

## 2018-09-13 PROCEDURE — 84550 ASSAY OF BLOOD/URIC ACID: CPT

## 2018-09-13 PROCEDURE — 96374 THER/PROPH/DIAG INJ IV PUSH: CPT | Mod: XU

## 2018-09-13 PROCEDURE — 83880 ASSAY OF NATRIURETIC PEPTIDE: CPT

## 2018-09-13 PROCEDURE — 93306 TTE W/DOPPLER COMPLETE: CPT

## 2018-09-13 PROCEDURE — 84484 ASSAY OF TROPONIN QUANT: CPT

## 2018-09-13 PROCEDURE — G8979: CPT | Mod: CM

## 2018-09-13 PROCEDURE — 82962 GLUCOSE BLOOD TEST: CPT

## 2018-09-13 PROCEDURE — 80053 COMPREHEN METABOLIC PANEL: CPT

## 2018-09-13 PROCEDURE — 96372 THER/PROPH/DIAG INJ SC/IM: CPT | Mod: XU

## 2018-09-13 PROCEDURE — 73610 X-RAY EXAM OF ANKLE: CPT

## 2018-09-13 PROCEDURE — 82553 CREATINE MB FRACTION: CPT

## 2018-09-13 PROCEDURE — 93971 EXTREMITY STUDY: CPT

## 2018-09-13 PROCEDURE — 85027 COMPLETE CBC AUTOMATED: CPT

## 2018-09-13 RX ORDER — LISINOPRIL 2.5 MG/1
1 TABLET ORAL
Qty: 0 | Refills: 0 | DISCHARGE
Start: 2018-09-13

## 2018-09-13 RX ORDER — METOPROLOL TARTRATE 50 MG
1 TABLET ORAL
Qty: 0 | Refills: 0 | COMMUNITY
Start: 2018-09-13

## 2018-09-13 RX ORDER — ACETAMINOPHEN 500 MG
2 TABLET ORAL
Qty: 0 | Refills: 0 | DISCHARGE
Start: 2018-09-13

## 2018-09-13 RX ORDER — ASPIRIN/CALCIUM CARB/MAGNESIUM 324 MG
1 TABLET ORAL
Qty: 0 | Refills: 0 | DISCHARGE
Start: 2018-09-13

## 2018-09-13 RX ORDER — TRAMADOL HYDROCHLORIDE 50 MG/1
1 TABLET ORAL
Qty: 0 | Refills: 0 | COMMUNITY
Start: 2018-09-13

## 2018-09-13 RX ADMIN — LISINOPRIL 2.5 MILLIGRAM(S): 2.5 TABLET ORAL at 05:07

## 2018-09-13 RX ADMIN — PANTOPRAZOLE SODIUM 40 MILLIGRAM(S): 20 TABLET, DELAYED RELEASE ORAL at 05:07

## 2018-09-13 RX ADMIN — Medication 20 MILLIGRAM(S): at 05:07

## 2018-09-13 RX ADMIN — Medication 0.6 MILLIGRAM(S): at 05:07

## 2018-09-13 NOTE — DISCHARGE NOTE ADULT - MEDICATION SUMMARY - MEDICATIONS TO TAKE
I will START or STAY ON the medications listed below when I get home from the hospital:    predniSONE 10 mg oral tablet  -- 3 tab(s) by mouth once a day  -- Indication: For GOUT - sent to pharmacy. Stop after 3 days    acetaminophen 325 mg oral tablet  -- 2 tab(s) by mouth every 6 hours, As needed, Temp greater or equal to 38C (100.4F), Mild Pain (1 - 3)  -- Indication: For Pain, as needed    aspirin 325 mg oral tablet  -- 1 tab(s) by mouth once a day  -- Indication: For CAD    lisinopril 2.5 mg oral tablet  -- 1 tab(s) by mouth once a day  -- Indication: For Hypertension    Zocor  -- 20 milligram(s) by mouth once a day  -- Indication: For Cholesterol    Plavix 75 mg oral tablet  -- 1 tab(s) by mouth once a day  -- Indication: For CAD    Lopressor 50 mg oral tablet  -- 1 tab(s) by mouth 2 times a day  -- Indication: For Hypertension

## 2018-09-13 NOTE — PROGRESS NOTE ADULT - PROBLEM SELECTOR PLAN 2
Denies pain now, trop negative, EKG normal
Denies pain now, trop negative, EKG normal, echo reviewed.

## 2018-09-13 NOTE — PROGRESS NOTE ADULT - PROBLEM SELECTOR PROBLEM 4
Type 2 diabetes mellitus with complication, with long-term current use of insulin
Type 2 diabetes mellitus with complication, with long-term current use of insulin

## 2018-09-13 NOTE — DISCHARGE NOTE ADULT - CARE PLAN
Principal Discharge DX:	Acute gout, unspecified cause, unspecified site  Goal:	Reduce pain in foot/ankle  Assessment and plan of treatment:	Take prednisone for 3 more days and then stop  Follow-up with Dr. Kaye next week  Secondary Diagnosis:	Chest pain, unspecified type  Secondary Diagnosis:	Foot pain, right  Secondary Diagnosis:	Type 2 diabetes mellitus with complication, with long-term current use of insulin  Secondary Diagnosis:	Coronary artery disease involving native coronary artery of native heart without angina pectoris

## 2018-09-13 NOTE — DISCHARGE NOTE ADULT - HOSPITAL COURSE
Patient admitted for atypical chest pain, foot pain, difficulty walking. Troponins negative. Echo reviewed. Pains improved with colchicine and prednisone. Patient refusing further colchicine. Patient's ambulation status improved. Seen by YESSY colby for d/c to home.

## 2018-09-13 NOTE — DISCHARGE NOTE ADULT - PATIENT PORTAL LINK FT
You can access the [facility] Patient Portal, offered by [facility], by registering with the following website: [email or web address]

## 2018-09-13 NOTE — PROGRESS NOTE ADULT - PROBLEM SELECTOR PLAN 1
Likely gout, now improved, contineu colchicine, Prednisone, pt was unable to ambulate, will get PT, should be able to walk soon
Likely gout, now improved. Patient does not want colchicine any longer. Continue short course of prednisone. Ambulatory status improved. PT follow-up - does not need rehab.

## 2018-09-13 NOTE — DISCHARGE NOTE ADULT - PLAN OF CARE
Reduce pain in foot/ankle Take prednisone for 3 more days and then stop  Follow-up with Dr. Kaye next week

## 2018-09-13 NOTE — DISCHARGE NOTE ADULT - SECONDARY DIAGNOSIS.
Chest pain, unspecified type Foot pain, right Type 2 diabetes mellitus with complication, with long-term current use of insulin Coronary artery disease involving native coronary artery of native heart without angina pectoris

## 2018-09-13 NOTE — PROGRESS NOTE ADULT - SUBJECTIVE AND OBJECTIVE BOX
CC: Patient is a 82y old  Female who presents with a chief complaint of right foot pain, diff ambulating, chest pain (12 Sep 2018 06:13)      S: No f/c/n/v, still has pain in right foot, no chest pain    Patient seen and examined at bedside.    ALLERGIES:  sulfa drugs (Rash)      MEDICATIONS:  acetaminophen   Tablet .. 650 milliGRAM(s) Oral every 6 hours PRN  aspirin 325 milliGRAM(s) Oral daily  atorvastatin 10 milliGRAM(s) Oral at bedtime  clopidogrel Tablet 75 milliGRAM(s) Oral daily  colchicine 0.6 milliGRAM(s) Oral two times a day  dextrose 40% Gel 15 Gram(s) Oral once PRN  dextrose 5%. 1000 milliLiter(s) IV Continuous <Continuous>  dextrose 50% Injectable 12.5 Gram(s) IV Push once  dextrose 50% Injectable 25 Gram(s) IV Push once  dextrose 50% Injectable 25 Gram(s) IV Push once  docusate sodium 200 milliGRAM(s) Oral daily  enoxaparin Injectable 40 milliGRAM(s) SubCutaneous every 24 hours  glucagon  Injectable 1 milliGRAM(s) IntraMuscular once PRN  insulin lispro (HumaLOG) corrective regimen sliding scale   SubCutaneous three times a day before meals  insulin lispro (HumaLOG) corrective regimen sliding scale   SubCutaneous at bedtime  lisinopril 2.5 milliGRAM(s) Oral daily  metFORMIN 500 milliGRAM(s) Oral two times a day  metoprolol tartrate 25 milliGRAM(s) Oral two times a day  pantoprazole    Tablet 40 milliGRAM(s) Oral before breakfast  predniSONE   Tablet 20 milliGRAM(s) Oral daily  senna 2 Tablet(s) Oral at bedtime  traMADol 50 milliGRAM(s) Oral three times a day PRN        Vital Signs Last 24 Hrs  T(F): 98.4 (12 Sep 2018 09:39), Max: 99.1 (12 Sep 2018 03:05)  HR: 71 (12 Sep 2018 09:39) (53 - 71)  BP: 146/46 (12 Sep 2018 09:39) (122/61 - 146/90)  RR: 15 (12 Sep 2018 09:39) (14 - 18)  SpO2: 100% (12 Sep 2018 08:37) (98% - 100%)  I&O's Summary      PHYSICAL EXAM:  General: NAD  ENT: MMM  Neck: Supple, No JVD  Lungs: Clear to auscultation bilaterally  Cardio: RRR, S1/S2, No murmurs  Abdomen: Soft, Nontender, Nondistended; Bowel sounds present  Extremities: No cyanosis, No edema  Neuro: no new deficits  Skin: no rashes  Psych: AAO    LABS:                        13.2   7.6   )-----------( 186      ( 12 Sep 2018 04:06 )             39.4     09-12    145  |  107  |  7   ----------------------------<  131  3.0   |  29  |  1.22    Ca    9.3      12 Sep 2018 04:06  Mg     2.0     09-12    TPro  8.1  /  Alb  3.1  /  TBili  0.6  /  DBili  x   /  AST  20  /  ALT  14  /  AlkPhos  90  09-12    eGFR if Non African American: 42 mL/min/1.73M2 (09-12-18 @ 04:06)  eGFR if African American: 48 mL/min/1.73M2 (09-12-18 @ 04:06)    PT/INR - ( 12 Sep 2018 04:06 )   PT: 12.8 sec;   INR: 1.15 ratio         PTT - ( 12 Sep 2018 04:06 )  PTT:31.9 sec    CARDIAC MARKERS ( 12 Sep 2018 04:06 )  <.017 ng/mL / x     / 89 U/L / x     / <0.5 ng/mL              CAPILLARY BLOOD GLUCOSE      POCT Blood Glucose.: 102 mg/dL (12 Sep 2018 08:04)            RADIOLOGY & ADDITIONAL TESTS:    Care Discussed with Consultants/Other Providers:
Patient is a 82y old  Female who presents with a chief complaint of right foot pain, diff ambulating, chest pain (12 Sep 2018 09:55)    Patient seen and examined at bedside. NO longer with chest pain. NO foot pain. NO ambulation difficulties at this time.     ALLERGIES:  sulfa drugs (Rash)    MEDICATIONS  (STANDING):  aspirin 325 milliGRAM(s) Oral daily  atorvastatin 10 milliGRAM(s) Oral at bedtime  clopidogrel Tablet 75 milliGRAM(s) Oral daily  colchicine 0.6 milliGRAM(s) Oral two times a day  dextrose 5%. 1000 milliLiter(s) (50 mL/Hr) IV Continuous <Continuous>  dextrose 50% Injectable 12.5 Gram(s) IV Push once  dextrose 50% Injectable 25 Gram(s) IV Push once  dextrose 50% Injectable 25 Gram(s) IV Push once  docusate sodium 200 milliGRAM(s) Oral daily  enoxaparin Injectable 40 milliGRAM(s) SubCutaneous every 24 hours  insulin lispro (HumaLOG) corrective regimen sliding scale   SubCutaneous three times a day before meals  insulin lispro (HumaLOG) corrective regimen sliding scale   SubCutaneous at bedtime  lisinopril 2.5 milliGRAM(s) Oral daily  metoprolol tartrate 25 milliGRAM(s) Oral two times a day  pantoprazole    Tablet 40 milliGRAM(s) Oral before breakfast  predniSONE   Tablet 20 milliGRAM(s) Oral daily  senna 2 Tablet(s) Oral at bedtime    MEDICATIONS  (PRN):  acetaminophen   Tablet .. 650 milliGRAM(s) Oral every 6 hours PRN Temp greater or equal to 38C (100.4F), Mild Pain (1 - 3)  dextrose 40% Gel 15 Gram(s) Oral once PRN Blood Glucose LESS THAN 70 milliGRAM(s)/deciliter  glucagon  Injectable 1 milliGRAM(s) IntraMuscular once PRN Glucose LESS THAN 70 milligrams/deciliter  traMADol 50 milliGRAM(s) Oral three times a day PRN Severe Pain (7 - 10)    Vital Signs Last 24 Hrs  T(F): 98.7 (13 Sep 2018 05:03), Max: 98.7 (13 Sep 2018 05:03)  HR: 42 (13 Sep 2018 05:03) (42 - 65)  BP: 163/43 (13 Sep 2018 05:03) (133/77 - 163/43)  RR: 16 (13 Sep 2018 05:03) (15 - 16)  SpO2: 100% (13 Sep 2018 05:03) (98% - 100%)  I&O's Summary    12 Sep 2018 07:01  -  13 Sep 2018 07:00  --------------------------------------------------------  IN: 240 mL / OUT: 300 mL / NET: -60 mL      PHYSICAL EXAM:  General: Anxious  ENT: MMM  Neck: Supple, No JVD  Lungs: Clear to auscultation bilaterally  Cardio: RRR, S1/S2  Abdomen: Soft, Nontender, Nondistended; Bowel sounds present; obese  Extremities: No calf tenderness, trace pitting edema    LABS:                        12.4   5.8   )-----------( 186      ( 12 Sep 2018 10:35 )             38.4     09-12    x   |  x   |  x   ----------------------------<  x   3.6   |  x   |  x     Ca    9.3      12 Sep 2018 10:35  Mg     2.0     09-12    TPro  8.1  /  Alb  3.1  /  TBili  0.6  /  DBili  x   /  AST  20  /  ALT  14  /  AlkPhos  90  09-12    eGFR if Non African American: 38 mL/min/1.73M2 (09-12-18 @ 10:35)  eGFR if : 44 mL/min/1.73M2 (09-12-18 @ 10:35)    PT/INR - ( 12 Sep 2018 04:06 )   PT: 12.8 sec;   INR: 1.15 ratio         PTT - ( 12 Sep 2018 04:06 )  PTT:31.9 sec    CARDIAC MARKERS ( 12 Sep 2018 12:09 )  <.017 ng/mL / x     / x     / x     / x      CARDIAC MARKERS ( 12 Sep 2018 04:06 )  <.017 ng/mL / x     / 89 U/L / x     / <0.5 ng/mL    CAPILLARY BLOOD GLUCOSE      POCT Blood Glucose.: 115 mg/dL (13 Sep 2018 08:00)  POCT Blood Glucose.: 255 mg/dL (12 Sep 2018 16:35)  POCT Blood Glucose.: 182 mg/dL (12 Sep 2018 11:48)          RADIOLOGY & ADDITIONAL TESTS:  < from: US Duplex Venous Lower Ext Ltd, Right (09.12.18 @ 12:01) >  No evidence ofright lower extremity deep venous thrombosis.    < end of copied text >    < from: Xray Ankle Complete 3 Views, Right (09.12.18 @ 05:28) >  IMPRESSION: Slight swelling. Degeneration and arterial calcification.    < end of copied text >  < from: TTE Echo Complete w/Doppler (09.12.18 @ 12:31) >   1. Left ventricular ejection fraction, by visual estimation, is 65 to   70%.   2. Spectral Doppler shows impaired relaxation pattern of left   ventricular myocardial filling (Grade I diastolic dysfunction).   3. Normal right ventricular size and function.   4. Mild mitral stenosis.   5. Thickening and calcification of the anterior and posterior mitral   valve leaflets.   6. Mild tricuspid regurgitation.   7. Sclerotic aortic valve with normal opening.   8. The aortic valve mean gradient is 6.1 mmHg consistent with normally   opening aortic valve.    < end of copied text >      Care Discussed with Consultants/Other Providers: BONNIE Thomas

## 2018-09-13 NOTE — PROGRESS NOTE ADULT - ASSESSMENT
82 y o female with HTN, CAD, DM, RA, Obesity, brought in by EMS for severe R foot and ankle pain unable to stand or ambulate adm for acute gout and episode of chest pain, hx of CAD, r/o ACS though now chest pain free.
82 y o female with HTN, CAD, DM, RA, Obesity, brought in by EMS for severe R foot and ankle pain unable to stand or ambulate adm for acute gout and episode of chest pain, hx of CAD, r/o ACS though now chest pain free.  Patient wants to leave today. Will d/c home.

## 2018-09-14 ENCOUNTER — RX RENEWAL (OUTPATIENT)
Age: 83
End: 2018-09-14

## 2018-09-17 ENCOUNTER — EMERGENCY (EMERGENCY)
Facility: HOSPITAL | Age: 83
LOS: 1 days | Discharge: HOME | End: 2018-09-17
Attending: EMERGENCY MEDICINE | Admitting: EMERGENCY MEDICINE
Payer: COMMERCIAL

## 2018-09-17 VITALS
TEMPERATURE: 98 F | RESPIRATION RATE: 18 BRPM | SYSTOLIC BLOOD PRESSURE: 144 MMHG | OXYGEN SATURATION: 100 % | DIASTOLIC BLOOD PRESSURE: 65 MMHG | HEART RATE: 50 BPM

## 2018-09-17 VITALS
HEART RATE: 56 BPM | DIASTOLIC BLOOD PRESSURE: 74 MMHG | OXYGEN SATURATION: 100 % | HEIGHT: 65 IN | SYSTOLIC BLOOD PRESSURE: 167 MMHG | RESPIRATION RATE: 20 BRPM | TEMPERATURE: 98 F | WEIGHT: 240.08 LBS

## 2018-09-17 DIAGNOSIS — S89.91XA UNSPECIFIED INJURY OF RIGHT LOWER LEG, INITIAL ENCOUNTER: ICD-10-CM

## 2018-09-17 PROCEDURE — 99283 EMERGENCY DEPT VISIT LOW MDM: CPT | Mod: 25

## 2018-09-17 PROCEDURE — 71045 X-RAY EXAM CHEST 1 VIEW: CPT

## 2018-09-17 PROCEDURE — 73562 X-RAY EXAM OF KNEE 3: CPT

## 2018-09-17 PROCEDURE — 99284 EMERGENCY DEPT VISIT MOD MDM: CPT | Mod: 25

## 2018-09-17 PROCEDURE — 73502 X-RAY EXAM HIP UNI 2-3 VIEWS: CPT

## 2018-09-17 PROCEDURE — 73562 X-RAY EXAM OF KNEE 3: CPT | Mod: 26,50

## 2018-09-17 PROCEDURE — 73502 X-RAY EXAM HIP UNI 2-3 VIEWS: CPT | Mod: 26,RT

## 2018-09-17 PROCEDURE — 71045 X-RAY EXAM CHEST 1 VIEW: CPT | Mod: 26

## 2018-09-17 RX ORDER — IBUPROFEN 200 MG
400 TABLET ORAL ONCE
Qty: 0 | Refills: 0 | Status: COMPLETED | OUTPATIENT
Start: 2018-09-17 | End: 2018-09-17

## 2018-09-17 RX ORDER — ACETAMINOPHEN 500 MG
650 TABLET ORAL ONCE
Qty: 0 | Refills: 0 | Status: COMPLETED | OUTPATIENT
Start: 2018-09-17 | End: 2018-09-17

## 2018-09-17 RX ADMIN — Medication 650 MILLIGRAM(S): at 05:44

## 2018-09-17 RX ADMIN — Medication 400 MILLIGRAM(S): at 05:44

## 2018-09-17 NOTE — ED PROVIDER NOTE - MUSCULOSKELETAL, MLM
no focal back ttp or lesions/discoloration.  pelvis stable nontender.  mild pain c ROM R hip. no focal ttp R hip. bilat knees c mild generalized anterior ttp, no swelling or discoloration. no laxity. 2+ dp pulses.

## 2018-09-17 NOTE — ED ADULT NURSE NOTE - NSIMPLEMENTINTERV_GEN_ALL_ED
Implemented All Fall with Harm Risk Interventions:  [place] to call system. Call bell, personal items and telephone within reach. Instruct patient to call for assistance. Room bathroom lighting operational. Non-slip footwear when patient is off stretcher. Physically safe environment: no spills, clutter or unnecessary equipment. Stretcher in lowest position, wheels locked, appropriate side rails in place. Provide visual cue, wrist band, yellow gown, etc. Monitor gait and stability. Monitor for mental status changes and reorient to person, place, and time. Review medications for side effects contributing to fall risk. Reinforce activity limits and safety measures with patient and family. Provide visual clues: red socks.

## 2018-09-17 NOTE — ED PROVIDER NOTE - PROGRESS NOTE DETAILS
pt was admitted last week for leg pains and chest pain. was c/o RLE pain and had us duplex RLE neg for dvt. pt states she has no one that can take her home. she has been minimally ambulatory at home with walker.  does not have walker with her here. states she would be unable to walk with only cane. pt refusing consideration for admission/ evaluation for rehab placement. wants to go home.  will dc home c ambulance

## 2018-09-17 NOTE — ED ADULT NURSE NOTE - OBJECTIVE STATEMENT
P states that while trying to get up from her recliner yesterday around 3 pm, she accidentally fell forward. Denies any LOC. C/O bilateral knee pain and back pain

## 2018-09-17 NOTE — ED PROVIDER NOTE - OBJECTIVE STATEMENT
pt c/o bilat knee pains, moderate, sharp, right greater than left since fall 3am tonight. pt states she was sleeping on sofa and must have fallen off and woken. pt also states she may have twisted her back, c/o mild pain L upper back area. no chest pain, sob, headache, neck pain.

## 2018-09-17 NOTE — ED PROVIDER NOTE - MEDICAL DECISION MAKING DETAILS
bilat knee and R hip pain, L upper back pain p fall. back exam benign, likely mild sprain. will xray knees/hip R r/o fx.  po analgesics. reassess

## 2018-09-17 NOTE — ED PROVIDER NOTE - CARE PLAN
Principal Discharge DX:	Contusion of knee, unspecified laterality, initial encounter  Secondary Diagnosis:	Back strain, initial encounter

## 2018-09-17 NOTE — ED PROVIDER NOTE - NEUROLOGICAL, MLM
Alert and oriented, no focal deficits, no motor or sensory deficits. nl distal LE sensation and strength

## 2018-09-17 NOTE — ED PROVIDER NOTE - PHYSICAL EXAMINATION
head without swelling/tenderness/ discoloration or other signs of trauma  no c/t/L spine tenderness.

## 2018-09-24 ENCOUNTER — EMERGENCY (EMERGENCY)
Facility: HOSPITAL | Age: 83
LOS: 1 days | Discharge: HOME | End: 2018-09-24
Attending: EMERGENCY MEDICINE | Admitting: EMERGENCY MEDICINE
Payer: COMMERCIAL

## 2018-09-24 VITALS
WEIGHT: 184.97 LBS | SYSTOLIC BLOOD PRESSURE: 157 MMHG | HEART RATE: 88 BPM | HEIGHT: 65 IN | OXYGEN SATURATION: 98 % | TEMPERATURE: 99 F | DIASTOLIC BLOOD PRESSURE: 82 MMHG | RESPIRATION RATE: 16 BRPM

## 2018-09-24 DIAGNOSIS — M54.2 CERVICALGIA: ICD-10-CM

## 2018-09-24 LAB
ALBUMIN SERPL ELPH-MCNC: 3.3 G/DL — SIGNIFICANT CHANGE UP (ref 3.3–5)
ALP SERPL-CCNC: 94 U/L — SIGNIFICANT CHANGE UP (ref 40–120)
ALT FLD-CCNC: 13 U/L DA — SIGNIFICANT CHANGE UP (ref 10–45)
ANION GAP SERPL CALC-SCNC: 7 MMOL/L — SIGNIFICANT CHANGE UP (ref 5–17)
AST SERPL-CCNC: 18 U/L — SIGNIFICANT CHANGE UP (ref 10–40)
BASOPHILS # BLD AUTO: 0.1 K/UL — SIGNIFICANT CHANGE UP (ref 0–0.2)
BASOPHILS NFR BLD AUTO: 1.1 % — SIGNIFICANT CHANGE UP (ref 0–2)
BILIRUB SERPL-MCNC: 0.7 MG/DL — SIGNIFICANT CHANGE UP (ref 0.2–1.2)
BUN SERPL-MCNC: 7 MG/DL — SIGNIFICANT CHANGE UP (ref 7–23)
CALCIUM SERPL-MCNC: 9.4 MG/DL — SIGNIFICANT CHANGE UP (ref 8.4–10.5)
CHLORIDE SERPL-SCNC: 106 MMOL/L — SIGNIFICANT CHANGE UP (ref 96–108)
CO2 SERPL-SCNC: 30 MMOL/L — SIGNIFICANT CHANGE UP (ref 22–31)
CREAT SERPL-MCNC: 1.23 MG/DL — SIGNIFICANT CHANGE UP (ref 0.5–1.3)
EOSINOPHIL # BLD AUTO: 0.1 K/UL — SIGNIFICANT CHANGE UP (ref 0–0.5)
EOSINOPHIL NFR BLD AUTO: 1.1 % — SIGNIFICANT CHANGE UP (ref 0–6)
GLUCOSE SERPL-MCNC: 125 MG/DL — HIGH (ref 70–99)
HCT VFR BLD CALC: 40.8 % — SIGNIFICANT CHANGE UP (ref 34.5–45)
HGB BLD-MCNC: 13.2 G/DL — SIGNIFICANT CHANGE UP (ref 11.5–15.5)
LYMPHOCYTES # BLD AUTO: 3 K/UL — SIGNIFICANT CHANGE UP (ref 1–3.3)
LYMPHOCYTES # BLD AUTO: 40.2 % — SIGNIFICANT CHANGE UP (ref 13–44)
MCHC RBC-ENTMCNC: 29.9 PG — SIGNIFICANT CHANGE UP (ref 27–34)
MCHC RBC-ENTMCNC: 32.3 GM/DL — SIGNIFICANT CHANGE UP (ref 32–36)
MCV RBC AUTO: 92.5 FL — SIGNIFICANT CHANGE UP (ref 80–100)
MONOCYTES # BLD AUTO: 0.5 K/UL — SIGNIFICANT CHANGE UP (ref 0–0.9)
MONOCYTES NFR BLD AUTO: 6.9 % — SIGNIFICANT CHANGE UP (ref 1–9)
NEUTROPHILS # BLD AUTO: 3.8 K/UL — SIGNIFICANT CHANGE UP (ref 1.8–7.4)
NEUTROPHILS NFR BLD AUTO: 50.8 % — SIGNIFICANT CHANGE UP (ref 43–77)
PLATELET # BLD AUTO: 215 K/UL — SIGNIFICANT CHANGE UP (ref 150–400)
POTASSIUM SERPL-MCNC: 3.6 MMOL/L — SIGNIFICANT CHANGE UP (ref 3.5–5.3)
POTASSIUM SERPL-SCNC: 3.6 MMOL/L — SIGNIFICANT CHANGE UP (ref 3.5–5.3)
PROT SERPL-MCNC: 8.2 G/DL — SIGNIFICANT CHANGE UP (ref 6–8.3)
RBC # BLD: 4.41 M/UL — SIGNIFICANT CHANGE UP (ref 3.8–5.2)
RBC # FLD: 13.5 % — SIGNIFICANT CHANGE UP (ref 10.3–14.5)
SODIUM SERPL-SCNC: 143 MMOL/L — SIGNIFICANT CHANGE UP (ref 135–145)
URATE SERPL-MCNC: 6.8 MG/DL — SIGNIFICANT CHANGE UP (ref 2.5–7)
WBC # BLD: 7.4 K/UL — SIGNIFICANT CHANGE UP (ref 3.8–10.5)
WBC # FLD AUTO: 7.4 K/UL — SIGNIFICANT CHANGE UP (ref 3.8–10.5)

## 2018-09-24 PROCEDURE — 72125 CT NECK SPINE W/O DYE: CPT | Mod: 26

## 2018-09-24 PROCEDURE — 85027 COMPLETE CBC AUTOMATED: CPT

## 2018-09-24 PROCEDURE — 72125 CT NECK SPINE W/O DYE: CPT

## 2018-09-24 PROCEDURE — 73562 X-RAY EXAM OF KNEE 3: CPT

## 2018-09-24 PROCEDURE — 84550 ASSAY OF BLOOD/URIC ACID: CPT

## 2018-09-24 PROCEDURE — 96374 THER/PROPH/DIAG INJ IV PUSH: CPT

## 2018-09-24 PROCEDURE — 99284 EMERGENCY DEPT VISIT MOD MDM: CPT | Mod: 25

## 2018-09-24 PROCEDURE — 73562 X-RAY EXAM OF KNEE 3: CPT | Mod: 26,RT

## 2018-09-24 PROCEDURE — 80053 COMPREHEN METABOLIC PANEL: CPT

## 2018-09-24 RX ORDER — SODIUM CHLORIDE 9 MG/ML
3 INJECTION INTRAMUSCULAR; INTRAVENOUS; SUBCUTANEOUS ONCE
Qty: 0 | Refills: 0 | Status: COMPLETED | OUTPATIENT
Start: 2018-09-24 | End: 2018-09-24

## 2018-09-24 RX ORDER — IBUPROFEN 200 MG
600 TABLET ORAL ONCE
Qty: 0 | Refills: 0 | Status: COMPLETED | OUTPATIENT
Start: 2018-09-24 | End: 2018-09-24

## 2018-09-24 RX ORDER — SODIUM CHLORIDE 9 MG/ML
1000 INJECTION INTRAMUSCULAR; INTRAVENOUS; SUBCUTANEOUS ONCE
Qty: 0 | Refills: 0 | Status: COMPLETED | OUTPATIENT
Start: 2018-09-24 | End: 2018-09-24

## 2018-09-24 RX ORDER — COLCHICINE 0.6 MG
1.2 TABLET ORAL ONCE
Qty: 0 | Refills: 0 | Status: COMPLETED | OUTPATIENT
Start: 2018-09-24 | End: 2018-09-24

## 2018-09-24 RX ADMIN — Medication 600 MILLIGRAM(S): at 06:45

## 2018-09-24 RX ADMIN — Medication 600 MILLIGRAM(S): at 06:15

## 2018-09-24 RX ADMIN — SODIUM CHLORIDE 1000 MILLILITER(S): 9 INJECTION INTRAMUSCULAR; INTRAVENOUS; SUBCUTANEOUS at 06:15

## 2018-09-24 RX ADMIN — Medication 1.2 MILLIGRAM(S): at 06:15

## 2018-09-24 RX ADMIN — SODIUM CHLORIDE 3 MILLILITER(S): 9 INJECTION INTRAMUSCULAR; INTRAVENOUS; SUBCUTANEOUS at 06:08

## 2018-09-24 RX ADMIN — Medication 125 MILLIGRAM(S): at 06:15

## 2018-09-24 NOTE — ED ADULT NURSE NOTE - OBJECTIVE STATEMENT
Pt c/o left sided neck pain and bilateral leg and knee pain x12 hours. Pt denies falls of trama. PMS in tact. Pt presents with right knee heat when compared to the left.

## 2018-09-24 NOTE — ED PROVIDER NOTE - PROGRESS NOTE DETAILS
Feeling improved. Ready to go home. Says she will take a taxi. Understands the importnace of f/u oupt with PCP

## 2018-09-24 NOTE — ED PROVIDER NOTE - PHYSICAL EXAMINATION
Gen:  alert, awake, no acute distress, obese  HEENT:  atraumatic head, airway clear, pupils equal and round  CV:  rrr, nl S1, S2, no m/r/g  Pulm:  lungs CTA b/l  Abd: s/nt/nd, +BS  Ext:  pain on moving b/l LE especially R knee.  R knee is hot and red w mild swelling and severe pain on ROM of knee  Neuro:  grossly intact, no focal deficits  Skin:  clear, dry, intact  Psych: AOx3, normal affect, no apparent risk to self or others

## 2018-09-24 NOTE — ED PROVIDER NOTE - OBJECTIVE STATEMENT
pt state that she has chronic pain in her joints but over the last 2 days very severe pain in the R knee and now cannot walk on it.  pt also c/o of some neck pain and low back pain, no trauma, h/o rheumatoid arthritis and gout, HTN, CAD w stents.  pt denies any fevers, chills, n/v/d

## 2018-09-24 NOTE — ED ADULT NURSE NOTE - NSIMPLEMENTINTERV_GEN_ALL_ED
Implemented All Fall Risk Interventions:  [place] to call system. Call bell, personal items and telephone within reach. Instruct patient to call for assistance. Room bathroom lighting operational. Non-slip footwear when patient is off stretcher. Physically safe environment: no spills, clutter or unnecessary equipment. Stretcher in lowest position, wheels locked, appropriate side rails in place. Provide visual cue, wrist band, yellow gown, etc. Monitor gait and stability. Monitor for mental status changes and reorient to person, place, and time. Review medications for side effects contributing to fall risk. Reinforce activity limits and safety measures with patient and family.

## 2018-09-24 NOTE — ED ADULT TRIAGE NOTE - CHIEF COMPLAINT QUOTE
Pt BIB EMS from home with c/o left sided neck pain and bilateral lower leg pain. Pt has hx of arthritis. Pt denies fall or any trauma.

## 2018-10-09 ENCOUNTER — RX RENEWAL (OUTPATIENT)
Age: 83
End: 2018-10-09

## 2018-10-28 ENCOUNTER — EMERGENCY (EMERGENCY)
Facility: HOSPITAL | Age: 83
LOS: 1 days | Discharge: HOME | End: 2018-10-28
Attending: INTERNAL MEDICINE | Admitting: INTERNAL MEDICINE
Payer: MEDICARE

## 2018-10-28 VITALS
HEART RATE: 78 BPM | SYSTOLIC BLOOD PRESSURE: 147 MMHG | DIASTOLIC BLOOD PRESSURE: 64 MMHG | TEMPERATURE: 98 F | OXYGEN SATURATION: 98 % | RESPIRATION RATE: 18 BRPM

## 2018-10-28 VITALS
RESPIRATION RATE: 18 BRPM | HEART RATE: 88 BPM | SYSTOLIC BLOOD PRESSURE: 148 MMHG | TEMPERATURE: 99 F | DIASTOLIC BLOOD PRESSURE: 83 MMHG | HEIGHT: 66 IN | OXYGEN SATURATION: 98 %

## 2018-10-28 DIAGNOSIS — M79.669 PAIN IN UNSPECIFIED LOWER LEG: ICD-10-CM

## 2018-10-28 LAB
ALBUMIN SERPL ELPH-MCNC: 3.1 G/DL — LOW (ref 3.3–5)
ALP SERPL-CCNC: 92 U/L — SIGNIFICANT CHANGE UP (ref 40–120)
ALT FLD-CCNC: 17 U/L DA — SIGNIFICANT CHANGE UP (ref 10–45)
ANION GAP SERPL CALC-SCNC: 7 MMOL/L — SIGNIFICANT CHANGE UP (ref 5–17)
ANISOCYTOSIS BLD QL: SLIGHT — SIGNIFICANT CHANGE UP
AST SERPL-CCNC: 23 U/L — SIGNIFICANT CHANGE UP (ref 10–40)
BASOPHILS # BLD AUTO: 0.1 K/UL — SIGNIFICANT CHANGE UP (ref 0–0.2)
BILIRUB SERPL-MCNC: 0.6 MG/DL — SIGNIFICANT CHANGE UP (ref 0.2–1.2)
BUN SERPL-MCNC: 8 MG/DL — SIGNIFICANT CHANGE UP (ref 7–23)
CALCIUM SERPL-MCNC: 9.4 MG/DL — SIGNIFICANT CHANGE UP (ref 8.4–10.5)
CHLORIDE SERPL-SCNC: 107 MMOL/L — SIGNIFICANT CHANGE UP (ref 96–108)
CO2 SERPL-SCNC: 29 MMOL/L — SIGNIFICANT CHANGE UP (ref 22–31)
CREAT SERPL-MCNC: 1.15 MG/DL — SIGNIFICANT CHANGE UP (ref 0.5–1.3)
EOSINOPHIL # BLD AUTO: 0.1 K/UL — SIGNIFICANT CHANGE UP (ref 0–0.5)
GLUCOSE SERPL-MCNC: 126 MG/DL — HIGH (ref 70–99)
HCT VFR BLD CALC: 39.6 % — SIGNIFICANT CHANGE UP (ref 34.5–45)
HGB BLD-MCNC: 13 G/DL — SIGNIFICANT CHANGE UP (ref 11.5–15.5)
LYMPHOCYTES # BLD AUTO: 3.1 K/UL — SIGNIFICANT CHANGE UP (ref 1–3.3)
LYMPHOCYTES # BLD AUTO: 46 % — HIGH (ref 13–44)
MCHC RBC-ENTMCNC: 30.6 PG — SIGNIFICANT CHANGE UP (ref 27–34)
MCHC RBC-ENTMCNC: 32.9 GM/DL — SIGNIFICANT CHANGE UP (ref 32–36)
MCV RBC AUTO: 93.2 FL — SIGNIFICANT CHANGE UP (ref 80–100)
MONOCYTES # BLD AUTO: 0.7 K/UL — SIGNIFICANT CHANGE UP (ref 0–0.9)
MONOCYTES NFR BLD AUTO: 6 % — SIGNIFICANT CHANGE UP (ref 2–14)
NEUTROPHILS # BLD AUTO: 3.8 K/UL — SIGNIFICANT CHANGE UP (ref 1.8–7.4)
NEUTROPHILS NFR BLD AUTO: 48 % — SIGNIFICANT CHANGE UP (ref 43–77)
PLAT MORPH BLD: NORMAL — SIGNIFICANT CHANGE UP
PLATELET # BLD AUTO: 203 K/UL — SIGNIFICANT CHANGE UP (ref 150–400)
POTASSIUM SERPL-MCNC: 3.8 MMOL/L — SIGNIFICANT CHANGE UP (ref 3.5–5.3)
POTASSIUM SERPL-SCNC: 3.8 MMOL/L — SIGNIFICANT CHANGE UP (ref 3.5–5.3)
PROT SERPL-MCNC: 7.6 G/DL — SIGNIFICANT CHANGE UP (ref 6–8.3)
RBC # BLD: 4.25 M/UL — SIGNIFICANT CHANGE UP (ref 3.8–5.2)
RBC # FLD: 13.6 % — SIGNIFICANT CHANGE UP (ref 10.3–14.5)
RBC BLD AUTO: NORMAL — SIGNIFICANT CHANGE UP
SODIUM SERPL-SCNC: 143 MMOL/L — SIGNIFICANT CHANGE UP (ref 135–145)
URATE SERPL-MCNC: 7.2 MG/DL — HIGH (ref 2.5–7)
WBC # BLD: 7.8 K/UL — SIGNIFICANT CHANGE UP (ref 3.8–10.5)
WBC # FLD AUTO: 7.8 K/UL — SIGNIFICANT CHANGE UP (ref 3.8–10.5)

## 2018-10-28 PROCEDURE — 80053 COMPREHEN METABOLIC PANEL: CPT

## 2018-10-28 PROCEDURE — 73610 X-RAY EXAM OF ANKLE: CPT | Mod: 26,RT

## 2018-10-28 PROCEDURE — 73620 X-RAY EXAM OF FOOT: CPT

## 2018-10-28 PROCEDURE — 85027 COMPLETE CBC AUTOMATED: CPT

## 2018-10-28 PROCEDURE — 99284 EMERGENCY DEPT VISIT MOD MDM: CPT

## 2018-10-28 PROCEDURE — 84550 ASSAY OF BLOOD/URIC ACID: CPT

## 2018-10-28 PROCEDURE — 73620 X-RAY EXAM OF FOOT: CPT | Mod: 26,RT

## 2018-10-28 PROCEDURE — 73610 X-RAY EXAM OF ANKLE: CPT

## 2018-10-28 RX ORDER — COLCHICINE 0.6 MG
1 TABLET ORAL
Qty: 28 | Refills: 0
Start: 2018-10-28 | End: 2018-11-10

## 2018-10-28 RX ORDER — ACETAMINOPHEN 500 MG
650 TABLET ORAL ONCE
Qty: 0 | Refills: 0 | Status: COMPLETED | OUTPATIENT
Start: 2018-10-28 | End: 2018-10-28

## 2018-10-28 RX ORDER — COLCHICINE 0.6 MG
0.6 TABLET ORAL ONCE
Qty: 0 | Refills: 0 | Status: COMPLETED | OUTPATIENT
Start: 2018-10-28 | End: 2018-10-28

## 2018-10-28 RX ADMIN — Medication 650 MILLIGRAM(S): at 16:53

## 2018-10-28 RX ADMIN — Medication 0.6 MILLIGRAM(S): at 16:53

## 2018-10-28 NOTE — ED ADULT NURSE NOTE - NSIMPLEMENTINTERV_GEN_ALL_ED
Implemented All Fall with Harm Risk Interventions:  [place] to call system. Call bell, personal items and telephone within reach. Instruct patient to call for assistance. Room bathroom lighting operational. Non-slip footwear when patient is off stretcher. Physically safe environment: no spills, clutter or unnecessary equipment. Stretcher in lowest position, wheels locked, appropriate side rails in place. Provide visual cue, wrist band, yellow gown, etc. Monitor gait and stability. Monitor for mental status changes and reorient to person, place, and time. Review medications for side effects contributing to fall risk. Reinforce activity limits and safety measures with patient and family. Provide visual clues: red socks.

## 2018-10-28 NOTE — ED PROVIDER NOTE - OBJECTIVE STATEMENT
81 y/o F with h/o DM, CAD with stent, Rheumatoid Arthritis, Gout presenting with sudden onset right 1st toe pain that migrated to her foot and then her ankle rapidly over the course of 1 day. States she was recently treated for gout in her right foot. Denies injury, trauma, redness, warmth, fever, chills, calf pain, chest pain, shortness of breath, weakness, numbness.

## 2018-10-28 NOTE — ED PROVIDER NOTE - PLAN OF CARE
Follow up with your PMD within 48-72 hrs. Show copies of your reports given to you. Take all of your medications as previously prescribed. Start colchicine 0.6mg 1 tab 2x/day for 2 weeks. Take Tylenol 650mg every 4-6 hours as needed for pain. Worsening, continued or ANY new concerning symptoms return to the emergency department.

## 2018-10-28 NOTE — ED ADULT NURSE NOTE - OBJECTIVE STATEMENT
Pt report having right foot pain that is constant and started yesterday. She states that its her gout. Denies any fevers, swelling in legs or shortness of breath.

## 2018-10-28 NOTE — ED PROVIDER NOTE - PROGRESS NOTE DETAILS
PA Tiberio- xrays no acute fractures. Basic labs WNL. Uric acid pending. Will treat with colchicine and Tylenol for acute gout flare

## 2018-10-28 NOTE — ED PROVIDER NOTE - ATTENDING CONTRIBUTION TO CARE
82 y f cc pain R great toe mp joint , hx of gout   pe neuro vs R LL intact R first toe mp joint R heel tenderness,   responded to colcicine, nl wbc, nl glucose  uric acid pending  Dr. Alanis:  I have reviewed and discussed with the PA/ resident the case specifics, including the history, physical assessment, evaluation, conclusion, laboratory results, and medical plan. I agree with the contents, and conclusions. I have personally examined, and interviewed the patient.

## 2018-10-28 NOTE — ED PROVIDER NOTE - CARE PLAN
Principal Discharge DX:	Gout Principal Discharge DX:	Gout  Assessment and plan of treatment:	Follow up with your PMD within 48-72 hrs. Show copies of your reports given to you. Take all of your medications as previously prescribed. Start colchicine 0.6mg 1 tab 2x/day for 2 weeks. Take Tylenol 650mg every 4-6 hours as needed for pain. Worsening, continued or ANY new concerning symptoms return to the emergency department.

## 2018-10-28 NOTE — ED PROVIDER NOTE - RESPIRATORY NEGATIVE STATEMENT, MLM
HISTORY OF PRESENT ILLNESS  Luann Boast is a 39 y.o. female. HPI  NEW patient referral for consultation by Dr. Anabel Oliva. The patient has a complaint of LEFT breast pain for the last 2 weeks. She describes the pain as an ache 2/10 and states she is not sure if she feels a lump or has skin changes? The patient describes her breast as\" lumpy\" and she is not sure what she is feeling. Her last mammogram was 2016 and was BI RADS 1. Family history - none    Mammogram - 2016 - BI RADS 1    Past Medical History   Diagnosis Date    Endometriosis        Past Surgical History   Procedure Laterality Date    Hx hysteroscopy with endometrial ablation  2010     in Dr Anabel Oliva office    Pr lap,tubal cautery      Hx pelvic laparoscopy       endometriosis       Social History     Social History    Marital status:      Spouse name: N/A    Number of children: N/A    Years of education: N/A     Occupational History    Not on file. Social History Main Topics    Smoking status: Never Smoker    Smokeless tobacco: Not on file    Alcohol use Yes      Comment: social    Drug use: No    Sexual activity: Yes     Partners: Male     Birth control/ protection: Surgical     Other Topics Concern    Not on file     Social History Narrative       No current outpatient prescriptions on file prior to visit. No current facility-administered medications on file prior to visit. No Known Allergies    OB History      Para Term  AB TAB SAB Ectopic Multiple Living    3 2 2 0 1 0 1 0 0 2        Obstetric Comments    Menarche: 17   LMP: ablation  # of Children: 2. Age at Delivery of First Child:  24   Hysterectomy/oophorectomy: no/no. Breast Bx: no.  Hx of Breast Feeding: yes. BCP: [id number] Hormone therapy:no           ROS  Constitutional: Negative    HENT: Negative. Eyes: Negative. Respiratory: Negative. Cardiovascular: Negative. Gastrointestinal: Negative. Genitourinary: Negative. Musculoskeletal: Negative. Skin: Negative. Neurological: Negative. Endo/Heme/Allergies: Negative. Psychiatric/Behavioral: Negative. Physical Exam   Cardiovascular: Normal rate and normal heart sounds. Pulmonary/Chest: Breath sounds normal. Right breast exhibits no inverted nipple, no mass, no nipple discharge, no skin change and no tenderness. Left breast exhibits mass and tenderness. Left breast exhibits no inverted nipple, no nipple discharge and no skin change. Breasts are symmetrical.       Lymphadenopathy:        Right cervical: No superficial cervical, no deep cervical and no posterior cervical adenopathy present. Left cervical: No superficial cervical, no deep cervical and no posterior cervical adenopathy present. Right axillary: No pectoral and no lateral adenopathy present. Left axillary: No pectoral and no lateral adenopathy present. BREAST ULTRASOUND  Indication: Left  breast mass UOQ  Technique: The area was scanned using a high-frequency linear-array near-field transducer  Findings: Normal dense breast tissue with small simple cyst.  Impression: Fibrocystic breast changes   Disposition: No worrisome finding on ultrasound    ASSESSMENT and PLAN    ICD-10-CM ICD-9-CM    1. Fibrocystic breast changes, left N60.12 610.1      Pt presents with tender LT breast mass x 2 weeks, likely related to fibrocystic breast changes. Last mammo looks good. Will treat with Vitamin E and Primrose oil for breast pain. F/u prn. This plan was reviewed with the patient and patient agrees. All questions were answered.     Written by Nilda Stern, as dictated by Dr. Mary Sams MD. no chest pain, no cough, and no shortness of breath.

## 2018-10-31 ENCOUNTER — APPOINTMENT (OUTPATIENT)
Dept: FAMILY MEDICINE | Facility: CLINIC | Age: 83
End: 2018-10-31

## 2018-11-01 ENCOUNTER — EMERGENCY (EMERGENCY)
Facility: HOSPITAL | Age: 83
LOS: 1 days | Discharge: HOME | End: 2018-11-01
Attending: EMERGENCY MEDICINE | Admitting: EMERGENCY MEDICINE
Payer: MEDICARE

## 2018-11-01 VITALS
RESPIRATION RATE: 19 BRPM | HEART RATE: 62 BPM | SYSTOLIC BLOOD PRESSURE: 135 MMHG | DIASTOLIC BLOOD PRESSURE: 77 MMHG | OXYGEN SATURATION: 100 %

## 2018-11-01 VITALS
WEIGHT: 199.96 LBS | DIASTOLIC BLOOD PRESSURE: 82 MMHG | RESPIRATION RATE: 18 BRPM | SYSTOLIC BLOOD PRESSURE: 136 MMHG | OXYGEN SATURATION: 97 % | TEMPERATURE: 97 F | HEIGHT: 63 IN | HEART RATE: 67 BPM

## 2018-11-01 PROCEDURE — 99283 EMERGENCY DEPT VISIT LOW MDM: CPT

## 2018-11-01 RX ORDER — MULTIVIT WITH MIN/MFOLATE/K2 340-15/3 G
150 POWDER (GRAM) ORAL ONCE
Qty: 0 | Refills: 0 | Status: COMPLETED | OUTPATIENT
Start: 2018-11-01 | End: 2018-11-01

## 2018-11-01 RX ADMIN — Medication 150 MILLILITER(S): at 02:46

## 2018-11-01 NOTE — ED PROVIDER NOTE - MEDICAL DECISION MAKING DETAILS
pt has severe constipation with hard stool, fecal impaction done and soap suds , if pt relieved , discharge home

## 2018-11-01 NOTE — ED ADULT NURSE NOTE - CHPI ED NUR SYMPTOMS NEG
no burning urination/no hematuria/no nausea/no fever/no abdominal distension/no diarrhea/no chills/no dysuria/no vomiting

## 2018-11-01 NOTE — ED ADULT NURSE NOTE - NSIMPLEMENTINTERV_GEN_ALL_ED
Implemented All Fall Risk Interventions:  [place] to call system. Call bell, personal items and telephone within reach. Instruct patient to call for assistance. Room bathroom lighting operational. Non-slip footwear when patient is off stretcher. Physically safe environment: no spills, clutter or unnecessary equipment. Stretcher in lowest position, wheels locked, appropriate side rails in place. Provide visual cue, wrist band, yellow gown, etc. Monitor gait and stability. Monitor for mental status changes and reorient to person, place, and time. Review medications for side effects contributing to fall risk. Reinforce activity limits and safety measures with patient and family.

## 2018-11-01 NOTE — ED ADULT NURSE REASSESSMENT NOTE - NS ED NURSE REASSESS COMMENT FT1
Pt able to have bowel movement post enema and states that she feels well enough to go home. Pt states that her pain decreased.

## 2018-11-01 NOTE — ED PROVIDER NOTE - OBJECTIVE STATEMENT
Pertinent PMH/PSH/FHx/SHx and Review of Systems contained within:  81 y/o F with h/oCAD (coronary artery disease)    DM (diabetes mellitus)    HLD (hyperlipidemia)    Hypertension    MI (myocardial infarction)    Obesity    Pulmonary embolism    Rheumatoid arthritis    Thyroid disease  presents to ed c/o she is constipated for past  5 days. C/o pain in rectal area due to solid stool. No abdominal pain, No N/V

## 2018-11-03 ENCOUNTER — EMERGENCY (EMERGENCY)
Facility: HOSPITAL | Age: 83
LOS: 1 days | Discharge: HOME | End: 2018-11-03
Attending: EMERGENCY MEDICINE | Admitting: EMERGENCY MEDICINE
Payer: MEDICARE

## 2018-11-03 VITALS
DIASTOLIC BLOOD PRESSURE: 77 MMHG | HEART RATE: 71 BPM | TEMPERATURE: 97 F | WEIGHT: 199.96 LBS | RESPIRATION RATE: 16 BRPM | HEIGHT: 66 IN | SYSTOLIC BLOOD PRESSURE: 116 MMHG

## 2018-11-03 DIAGNOSIS — M54.9 DORSALGIA, UNSPECIFIED: ICD-10-CM

## 2018-11-03 PROCEDURE — 96372 THER/PROPH/DIAG INJ SC/IM: CPT

## 2018-11-03 PROCEDURE — 72100 X-RAY EXAM L-S SPINE 2/3 VWS: CPT | Mod: 26

## 2018-11-03 PROCEDURE — 99283 EMERGENCY DEPT VISIT LOW MDM: CPT | Mod: 25

## 2018-11-03 PROCEDURE — 72100 X-RAY EXAM L-S SPINE 2/3 VWS: CPT

## 2018-11-03 PROCEDURE — 99284 EMERGENCY DEPT VISIT MOD MDM: CPT

## 2018-11-03 RX ORDER — ACETAMINOPHEN 500 MG
650 TABLET ORAL ONCE
Qty: 0 | Refills: 0 | Status: COMPLETED | OUTPATIENT
Start: 2018-11-03 | End: 2018-11-03

## 2018-11-03 RX ORDER — KETOROLAC TROMETHAMINE 30 MG/ML
30 SYRINGE (ML) INJECTION ONCE
Qty: 0 | Refills: 0 | Status: DISCONTINUED | OUTPATIENT
Start: 2018-11-03 | End: 2018-11-03

## 2018-11-03 RX ADMIN — Medication 650 MILLIGRAM(S): at 21:27

## 2018-11-03 RX ADMIN — Medication 30 MILLIGRAM(S): at 20:00

## 2018-11-03 NOTE — ED PROVIDER NOTE - OBJECTIVE STATEMENT
Pt c/o lower back pain since yesteray, diffuse lwoer back and sharp. no fever no chills. no urinary symptoms. no cp, no sob. no nausea no emesis. no radiation of pain down legs. 81 y/o F with h/o chronic back pain, RA, DM, CAD presents with exacerbation of diffuse lower back pain since yesterday described as sharp. Denies recent injury or trauma. no fever no chills. no urinary symptoms. no cp, no sob. no nausea no emesis. no radiation of pain down legs. no urinary/bowel incontinence. Recently seen here and treated for gout which has improved.

## 2018-11-03 NOTE — ED PROVIDER NOTE - NS ED ROS FT
Except as otherwise indicated in HPI:  CONSTITUTIONAL: Neg  HEENT: neg  CV: neg  Resp: neg  GI: Neg  : Neg  MSK: +back pain  SKIN: Neg  NEURO: Neg  PSYCHIATRIC: Neg  Heme/Onc: Neg

## 2018-11-03 NOTE — ED PROVIDER NOTE - PHYSICAL EXAMINATION
Gen:  alert, awake, no acute distress  Head:  atraumatic, normocephalic  HEENT: PERRLA, EOMI, normal nose, normal oropharynx, no tonsillar edema, erythema, or exudate  CV:  rrr, nl S1, S2, no m/r/g  Pulm:  lungs CTA b/l  Abd: s/nt/nd, +BS  MSK:  moving all extremities, no back midline ttp, no stepoffs, no cva TTP, +bl paraspinal lumbar ttp  Neuro:  grossly intact, no focal deficits  Skin:  clear, dry, intact  Psych: AOx3, normal affect, no apparent risk to self or others

## 2018-11-03 NOTE — ED PROVIDER NOTE - PROGRESS NOTE DETAILS
BONNIE Velásquez- improved s/p Toradol and Tylenol. Xray L-spine prelim read by. Dr. Yadav no acute fractures. Will DC home with PMD follow up

## 2018-11-03 NOTE — ED PROVIDER NOTE - ATTENDING CONTRIBUTION TO CARE
Dr. Yadav: I performed a face to face bedside interview with patient regarding history of present illness, review of symptoms and past medical history. I completed an independent physical exam.  I have discussed patient's plan of care with PA.   I agree with note as stated above, having amended the EMR as needed to reflect my findings.   This includes HISTORY OF PRESENT ILLNESS, HIV, PAST MEDICAL/SURGICAL/FAMILY/SOCIAL HISTORY, ALLERGIES AND HOME MEDICATIONS, REVIEW OF SYSTEMS, PHYSICAL EXAM, and any PROGRESS NOTES during the time I functioned as the attending physician for this patient.    dr yadav: pt with abck pain, xrays, analgesia Dr. Yadav: I performed a face to face bedside interview with patient regarding history of present illness, review of symptoms and past medical history. I completed an independent physical exam.  I have discussed patient's plan of care with PA.   I agree with note as stated above, having amended the EMR as needed to reflect my findings.   This includes HISTORY OF PRESENT ILLNESS, HIV, PAST MEDICAL/SURGICAL/FAMILY/SOCIAL HISTORY, ALLERGIES AND HOME MEDICATIONS, REVIEW OF SYSTEMS, PHYSICAL EXAM, and any PROGRESS NOTES during the time I functioned as the attending physician for this patient.    dr yadav: pt with abck pain, xrays, analgesia.

## 2018-11-08 ENCOUNTER — EMERGENCY (EMERGENCY)
Facility: HOSPITAL | Age: 83
LOS: 1 days | Discharge: HOME | End: 2018-11-08
Attending: EMERGENCY MEDICINE | Admitting: EMERGENCY MEDICINE
Payer: MEDICARE

## 2018-11-08 VITALS
TEMPERATURE: 98 F | OXYGEN SATURATION: 98 % | RESPIRATION RATE: 16 BRPM | HEART RATE: 59 BPM | SYSTOLIC BLOOD PRESSURE: 139 MMHG | DIASTOLIC BLOOD PRESSURE: 92 MMHG

## 2018-11-08 VITALS
HEIGHT: 65 IN | OXYGEN SATURATION: 100 % | HEART RATE: 56 BPM | DIASTOLIC BLOOD PRESSURE: 68 MMHG | TEMPERATURE: 98 F | SYSTOLIC BLOOD PRESSURE: 154 MMHG | RESPIRATION RATE: 18 BRPM

## 2018-11-08 PROCEDURE — 74176 CT ABD & PELVIS W/O CONTRAST: CPT | Mod: 26

## 2018-11-08 PROCEDURE — 99284 EMERGENCY DEPT VISIT MOD MDM: CPT

## 2018-11-08 PROCEDURE — 71250 CT THORAX DX C-: CPT | Mod: 26

## 2018-11-08 PROCEDURE — 99284 EMERGENCY DEPT VISIT MOD MDM: CPT | Mod: 25

## 2018-11-08 PROCEDURE — 71250 CT THORAX DX C-: CPT

## 2018-11-08 PROCEDURE — 74176 CT ABD & PELVIS W/O CONTRAST: CPT

## 2018-11-08 RX ORDER — DIAZEPAM 5 MG
5 TABLET ORAL ONCE
Qty: 0 | Refills: 0 | Status: DISCONTINUED | OUTPATIENT
Start: 2018-11-08 | End: 2018-11-08

## 2018-11-08 RX ORDER — OXYCODONE AND ACETAMINOPHEN 5; 325 MG/1; MG/1
1 TABLET ORAL ONCE
Qty: 0 | Refills: 0 | Status: DISCONTINUED | OUTPATIENT
Start: 2018-11-08 | End: 2018-11-08

## 2018-11-08 RX ORDER — IBUPROFEN 200 MG
600 TABLET ORAL ONCE
Qty: 0 | Refills: 0 | Status: COMPLETED | OUTPATIENT
Start: 2018-11-08 | End: 2018-11-08

## 2018-11-08 RX ADMIN — Medication 5 MILLIGRAM(S): at 17:14

## 2018-11-08 RX ADMIN — OXYCODONE AND ACETAMINOPHEN 1 TABLET(S): 5; 325 TABLET ORAL at 16:57

## 2018-11-08 RX ADMIN — Medication 600 MILLIGRAM(S): at 16:57

## 2018-11-08 RX ADMIN — Medication 600 MILLIGRAM(S): at 16:27

## 2018-11-08 RX ADMIN — OXYCODONE AND ACETAMINOPHEN 1 TABLET(S): 5; 325 TABLET ORAL at 15:02

## 2018-11-08 RX ADMIN — OXYCODONE AND ACETAMINOPHEN 1 TABLET(S): 5; 325 TABLET ORAL at 16:28

## 2018-11-08 RX ADMIN — OXYCODONE AND ACETAMINOPHEN 1 TABLET(S): 5; 325 TABLET ORAL at 15:32

## 2018-11-08 NOTE — ED PROVIDER NOTE - PROGRESS NOTE DETAILS
Pt still having some pain- will get ct at this time to r/o fracture. ct negative for fracture. Pt feeling better. Pt ambulating with walker. Pt stable for discharge.

## 2018-11-08 NOTE — ED PROVIDER NOTE - MEDICAL DECISION MAKING DETAILS
83 y/o F with h/o chronic back pain, RA, DM, CAD presents with exacerbation of diffuse lower back pain for the last week.  Denies recent injury or trauma. no fever no chills. no urinary symptoms. no cp, no sob. no nausea no emesis. no radiation of pain down legs. no urinary/bowel incontinence. Recently seen here and treated for gout which has improved. Pt also treated for back pain in ED a few days ago. Pt uses cane at baseline.: pain control, re eval Walls: 81 y/o F with h/o chronic back pain, RA, DM, CAD presents with exacerbation of diffuse lower back pain for the last week.  Denies recent injury or trauma. no fever no chills. no urinary symptoms. no cp, no sob. no nausea no emesis. no radiation of pain down legs. no urinary/bowel incontinence. Recently seen here and treated for gout which has improved. Pt also treated for back pain in ED a few days ago. Pt uses cane at baseline.: pain control, re eval

## 2018-11-08 NOTE — ED ADULT NURSE NOTE - OBJECTIVE STATEMENT
Patient complaining of worsening right and left back pain over the past week. Patient suffers from chronic back pain and gout. Patient states pain is 10/10, worsened with activity. Denies bowel/urinary incontinence.

## 2018-11-08 NOTE — ED ADULT NURSE NOTE - NSIMPLEMENTINTERV_GEN_ALL_ED
Implemented All Fall with Harm Risk Interventions:  [place] to call system. Call bell, personal items and telephone within reach. Instruct patient to call for assistance. Room bathroom lighting operational. Non-slip footwear when patient is off stretcher. Physically safe environment: no spills, clutter or unnecessary equipment. Stretcher in lowest position, wheels locked, appropriate side rails in place. Provide visual cue, wrist band, yellow gown, etc. Monitor gait and stability. Monitor for mental status changes and reorient to person, place, and time. Review medications for side effects contributing to fall risk. Reinforce activity limits and safety measures with patient and family. Provide visual clues: red socks.

## 2018-11-08 NOTE — ED PROVIDER NOTE - PHYSICAL EXAMINATION
spine- no bony tenderness, slight paraspinal b/l lumbar tenderness   pt unable to ambulate due to pain,   5/5 strength in b/l extremities spine- no bony tenderness, slight paraspinal b/l thoracic tenderness   pt unable to ambulate due to pain,   5/5 strength in b/l extremities

## 2018-11-08 NOTE — ED PROVIDER NOTE - OBJECTIVE STATEMENT
83 y/o F with h/o chronic back pain, RA, DM, CAD presents with exacerbation of diffuse lower back pain for the last week.  Denies recent injury or trauma. no fever no chills. no urinary symptoms. no cp, no sob. no nausea no emesis. no radiation of pain down legs. no urinary/bowel incontinence. Recently seen here and treated for gout which has improved. Pt also treated for back pain in ED a few days ago. Pt uses cane at baseline.

## 2018-11-08 NOTE — ED ADULT NURSE REASSESSMENT NOTE - REASSESS COMMUNICATION
Patient verbalizes moderate relief of pain. Patient given sandwich per her request, CT results pending/ED physician notified

## 2018-11-28 NOTE — ED PROVIDER NOTE - CROS ED CARDIOVAS ALL NEG
[FreeTextEntry1] : htn: recommend increasing both meds\par fu in 6 weeks\par renal artery duplex \par \par hematuria: to follow up with urology  negative...

## 2018-12-01 ENCOUNTER — EMERGENCY (EMERGENCY)
Facility: HOSPITAL | Age: 83
LOS: 1 days | Discharge: HOME | End: 2018-12-01
Attending: EMERGENCY MEDICINE | Admitting: EMERGENCY MEDICINE
Payer: MEDICARE

## 2018-12-01 VITALS
TEMPERATURE: 97 F | DIASTOLIC BLOOD PRESSURE: 81 MMHG | SYSTOLIC BLOOD PRESSURE: 145 MMHG | HEART RATE: 74 BPM | OXYGEN SATURATION: 98 % | RESPIRATION RATE: 18 BRPM

## 2018-12-01 DIAGNOSIS — R10.9 UNSPECIFIED ABDOMINAL PAIN: ICD-10-CM

## 2018-12-01 LAB
ANION GAP SERPL CALC-SCNC: 10 MMOL/L — SIGNIFICANT CHANGE UP (ref 5–17)
APTT BLD: 29.8 SEC — SIGNIFICANT CHANGE UP (ref 27.5–36.3)
BASOPHILS # BLD AUTO: 0 K/UL — SIGNIFICANT CHANGE UP (ref 0–0.2)
BASOPHILS NFR BLD AUTO: 1 % — SIGNIFICANT CHANGE UP (ref 0–2)
BUN SERPL-MCNC: 15 MG/DL — SIGNIFICANT CHANGE UP (ref 7–23)
CALCIUM SERPL-MCNC: 9.6 MG/DL — SIGNIFICANT CHANGE UP (ref 8.4–10.5)
CHLORIDE SERPL-SCNC: 105 MMOL/L — SIGNIFICANT CHANGE UP (ref 96–108)
CO2 SERPL-SCNC: 28 MMOL/L — SIGNIFICANT CHANGE UP (ref 22–31)
CREAT SERPL-MCNC: 1.32 MG/DL — HIGH (ref 0.5–1.3)
EOSINOPHIL # BLD AUTO: 0.1 K/UL — SIGNIFICANT CHANGE UP (ref 0–0.5)
EOSINOPHIL NFR BLD AUTO: 3 % — SIGNIFICANT CHANGE UP (ref 0–6)
GLUCOSE SERPL-MCNC: 116 MG/DL — HIGH (ref 70–99)
HCT VFR BLD CALC: 40.9 % — SIGNIFICANT CHANGE UP (ref 34.5–45)
HGB BLD-MCNC: 13.4 G/DL — SIGNIFICANT CHANGE UP (ref 11.5–15.5)
INR BLD: 1.13 RATIO — SIGNIFICANT CHANGE UP (ref 0.88–1.16)
LYMPHOCYTES # BLD AUTO: 2.1 K/UL — SIGNIFICANT CHANGE UP (ref 1–3.3)
LYMPHOCYTES # BLD AUTO: 52.4 % — HIGH (ref 13–44)
MCHC RBC-ENTMCNC: 29.9 PG — SIGNIFICANT CHANGE UP (ref 27–34)
MCHC RBC-ENTMCNC: 32.8 GM/DL — SIGNIFICANT CHANGE UP (ref 32–36)
MCV RBC AUTO: 91.1 FL — SIGNIFICANT CHANGE UP (ref 80–100)
MONOCYTES # BLD AUTO: 0.3 K/UL — SIGNIFICANT CHANGE UP (ref 0–0.9)
MONOCYTES NFR BLD AUTO: 8 % — SIGNIFICANT CHANGE UP (ref 1–9)
NEUTROPHILS # BLD AUTO: 1.4 K/UL — LOW (ref 1.8–7.4)
NEUTROPHILS NFR BLD AUTO: 35.6 % — LOW (ref 43–77)
PLATELET # BLD AUTO: 203 K/UL — SIGNIFICANT CHANGE UP (ref 150–400)
POTASSIUM SERPL-MCNC: 3.7 MMOL/L — SIGNIFICANT CHANGE UP (ref 3.5–5.3)
POTASSIUM SERPL-SCNC: 3.7 MMOL/L — SIGNIFICANT CHANGE UP (ref 3.5–5.3)
PROTHROM AB SERPL-ACNC: 12.7 SEC — SIGNIFICANT CHANGE UP (ref 10–12.9)
RBC # BLD: 4.49 M/UL — SIGNIFICANT CHANGE UP (ref 3.8–5.2)
RBC # FLD: 13.1 % — SIGNIFICANT CHANGE UP (ref 10.3–14.5)
SODIUM SERPL-SCNC: 143 MMOL/L — SIGNIFICANT CHANGE UP (ref 135–145)
WBC # BLD: 4 K/UL — SIGNIFICANT CHANGE UP (ref 3.8–10.5)
WBC # FLD AUTO: 4 K/UL — SIGNIFICANT CHANGE UP (ref 3.8–10.5)

## 2018-12-01 PROCEDURE — 80048 BASIC METABOLIC PNL TOTAL CA: CPT

## 2018-12-01 PROCEDURE — 99284 EMERGENCY DEPT VISIT MOD MDM: CPT

## 2018-12-01 PROCEDURE — 85027 COMPLETE CBC AUTOMATED: CPT

## 2018-12-01 PROCEDURE — 85610 PROTHROMBIN TIME: CPT

## 2018-12-01 PROCEDURE — 74176 CT ABD & PELVIS W/O CONTRAST: CPT

## 2018-12-01 PROCEDURE — 82272 OCCULT BLD FECES 1-3 TESTS: CPT

## 2018-12-01 PROCEDURE — 74176 CT ABD & PELVIS W/O CONTRAST: CPT | Mod: 26

## 2018-12-01 PROCEDURE — 85730 THROMBOPLASTIN TIME PARTIAL: CPT

## 2018-12-01 PROCEDURE — 74022 RADEX COMPL AQT ABD SERIES: CPT

## 2018-12-01 PROCEDURE — 74022 RADEX COMPL AQT ABD SERIES: CPT | Mod: 26

## 2018-12-01 RX ORDER — DOCUSATE SODIUM 100 MG
1 CAPSULE ORAL
Qty: 30 | Refills: 0
Start: 2018-12-01 | End: 2018-12-10

## 2018-12-01 RX ORDER — SODIUM CHLORIDE 9 MG/ML
3 INJECTION INTRAMUSCULAR; INTRAVENOUS; SUBCUTANEOUS ONCE
Qty: 0 | Refills: 0 | Status: COMPLETED | OUTPATIENT
Start: 2018-12-01 | End: 2018-12-01

## 2018-12-01 RX ADMIN — Medication 1 ENEMA: at 15:22

## 2018-12-01 RX ADMIN — SODIUM CHLORIDE 3 MILLILITER(S): 9 INJECTION INTRAMUSCULAR; INTRAVENOUS; SUBCUTANEOUS at 11:20

## 2018-12-01 NOTE — ED PROVIDER NOTE - PROGRESS NOTE DETAILS
BONNIE Donovan: I participated in the care/evaluation of this patient BONNIE Donovan: Patient reports she had a large BM after enema, reports feeling better after BM. Awaiting abd CT BONNIE Donovan: labs reviewed, no acute findings on abd CT,   patient stable for d.c with rx of colace

## 2018-12-01 NOTE — ED PROVIDER NOTE - ATTENDING CONTRIBUTION TO CARE
Dr. Yadav: I performed a face to face bedside interview with patient regarding history of present illness, review of symptoms and past medical history. I completed an independent physical exam.  I have discussed patient's plan of care with PA.   I agree with note as stated above, having amended the EMR as needed to reflect my findings.   This includes HISTORY OF PRESENT ILLNESS, HIV, PAST MEDICAL/SURGICAL/FAMILY/SOCIAL HISTORY, ALLERGIES AND HOME MEDICATIONS, REVIEW OF SYSTEMS, PHYSICAL EXAM, and any PROGRESS NOTES during the time I functioned as the attending physician for this patient.    dr yadav: 83 yo female with constipation, guaiac positive stool, will check bloodwork, xray and disimpaction, enema, re-eval Dr. Yadav: I performed a face to face bedside interview with patient regarding history of present illness, review of symptoms and past medical history. I completed an independent physical exam.  I have discussed patient's plan of care with PA.   I agree with note as stated above, having amended the EMR as needed to reflect my findings.   This includes HISTORY OF PRESENT ILLNESS, HIV, PAST MEDICAL/SURGICAL/FAMILY/SOCIAL HISTORY, ALLERGIES AND HOME MEDICATIONS, REVIEW OF SYSTEMS, PHYSICAL EXAM, and any PROGRESS NOTES during the time I functioned as the attending physician for this patient.    dr yadav: 83 yo female with constipation, guaiac positive stool, will check bloodwork, xray and disimpaction, enema, re-eval.

## 2018-12-01 NOTE — ED PROVIDER NOTE - MEDICAL DECISION MAKING DETAILS
83 yo female with constipation, guaiac positive stool, will check bloodwork, xray and disimpaction, enema, re-eval

## 2018-12-01 NOTE — ED PROVIDER NOTE - PHYSICAL EXAMINATION
Gen:  alert, awake, no acute distress, overweight  Head:  atraumatic, normocephalic  HEENT: PERRLA, EOMI, normal nose, normal oropharynx, no tonsillar edema, erythema, or exudate  CV:  rrr, nl S1, S2, no m/r/g  Pulm:  lungs CTA b/l  Abd: s/nt/nd, +BS; rectal exam: stool in rectal vault, no gross blood, guaiac positive brown  MSK:  moving all extremities, no back midline ttp, no stepoffs, no cva TTP  Neuro:  grossly intact, no focal deficits  Skin:  clear, dry, intact  Psych: AOx3, normal affect, no apparent risk to self or others Gen:  alert, awake, no acute distress, overweight  Head:  atraumatic, normocephalic  HEENT: PERRLA, EOMI, normal nose, normal oropharynx, no tonsillar edema, erythema, or exudate  CV:  rrr, nl S1, S2, no m/r/g  Pulm:  lungs CTA b/l  Abd: s/nt/nd, +BS; rectal exam: stool in rectal vault, no gross blood, guaiac positive brown; small amoutn of stool removed from rectal vault  MSK:  moving all extremities, no back midline ttp, no stepoffs, no cva TTP  Neuro:  grossly intact, no focal deficits  Skin:  clear, dry, intact  Psych: AOx3, normal affect, no apparent risk to self or others

## 2018-12-01 NOTE — ED PROVIDER NOTE - NS ED ROS FT
Except as otherwise indicated in HPI:  CONSTITUTIONAL: Neg  HEENT: neg  CV: neg  Resp: neg  GI: +constipation  : Neg  MSK: Neg  SKIN: Neg  NEURO: Neg  PSYCHIATRIC: Neg  Heme/Onc: Neg

## 2018-12-01 NOTE — ED ADULT TRIAGE NOTE - CHIEF COMPLAINT QUOTE
No BM for 4 days, and bright red blood from the rectum. Pt states she has been straining to move her bowels

## 2018-12-01 NOTE — ED PROVIDER NOTE - OBJECTIVE STATEMENT
Pt c/o several day hx constipation, today had some blood on toilet paper, states italo only past few days for stool. no abd pain, fever or chills. no nausea, no emesis. no diarrhea.

## 2018-12-01 NOTE — ED ADULT NURSE NOTE - NSIMPLEMENTINTERV_GEN_ALL_ED
Implemented All Universal Safety Interventions:  [place] to call system. Call bell, personal items and telephone within reach. Instruct patient to call for assistance. Room bathroom lighting operational. Non-slip footwear when patient is off stretcher. Physically safe environment: no spills, clutter or unnecessary equipment. Stretcher in lowest position, wheels locked, appropriate side rails in place.

## 2019-01-04 ENCOUNTER — EMERGENCY (EMERGENCY)
Facility: HOSPITAL | Age: 84
LOS: 1 days | Discharge: HOME | End: 2019-01-04
Attending: EMERGENCY MEDICINE | Admitting: EMERGENCY MEDICINE
Payer: MEDICARE

## 2019-01-04 VITALS
HEIGHT: 65 IN | TEMPERATURE: 98 F | OXYGEN SATURATION: 97 % | WEIGHT: 199.96 LBS | SYSTOLIC BLOOD PRESSURE: 144 MMHG | DIASTOLIC BLOOD PRESSURE: 76 MMHG | RESPIRATION RATE: 18 BRPM | HEART RATE: 74 BPM

## 2019-01-04 VITALS
SYSTOLIC BLOOD PRESSURE: 137 MMHG | DIASTOLIC BLOOD PRESSURE: 68 MMHG | RESPIRATION RATE: 15 BRPM | OXYGEN SATURATION: 98 % | HEART RATE: 70 BPM

## 2019-01-04 PROCEDURE — 99283 EMERGENCY DEPT VISIT LOW MDM: CPT

## 2019-01-04 RX ORDER — OXYCODONE AND ACETAMINOPHEN 5; 325 MG/1; MG/1
1 TABLET ORAL ONCE
Qty: 0 | Refills: 0 | Status: DISCONTINUED | OUTPATIENT
Start: 2019-01-04 | End: 2019-01-04

## 2019-01-04 RX ADMIN — OXYCODONE AND ACETAMINOPHEN 1 TABLET(S): 5; 325 TABLET ORAL at 22:06

## 2019-01-04 RX ADMIN — OXYCODONE AND ACETAMINOPHEN 1 TABLET(S): 5; 325 TABLET ORAL at 21:36

## 2019-01-04 NOTE — ED ADULT NURSE NOTE - OBJECTIVE STATEMENT
Patient with PMH OA, gout, DM, BIB EMS for complaints of worsening foot pain and pain in "all" her joints, 10/10. Patient reports difficulty ambulating due to pain. Patient denies any traumatic event. States she has chronic gout and chronic arthritis

## 2019-01-04 NOTE — ED ADULT NURSE NOTE - CAS EDN DISCHARGE ASSESSMENT
Please review protime orders and sign encounter, thank you!  Alert and oriented to person, place and time

## 2019-01-04 NOTE — ED PROVIDER NOTE - NSFOLLOWUPINSTRUCTIONS_ED_ALL_ED_FT
1. Percocet 1 tab every 6 hours as needed for pain. Do not take tylenol, drink alcohol or drive  on this bed.  2. Expect to be more sore tomorrow than today  3. Heat pack to affected area as needed for pain  4. Follow up with your doctor in 1-2 days  5. Return to the ED for pain that is worsening, fevers, chills or any concerns

## 2019-01-04 NOTE — ED PROVIDER NOTE - ATTENDING CONTRIBUTION TO CARE
Dr. Ma: This H&P has been written by myself in its entirety    Dr. Ma: I performed a face to face bedside interview with patient regarding history of present illness, review of symptoms and past medical history. I completed an independent physical exam.  I have discussed patient's plan of care with PA.   I agree with note as stated above, having amended the EMR as needed to reflect my findings.   This includes HISTORY OF PRESENT ILLNESS, HIV, PAST MEDICAL/SURGICAL/FAMILY/SOCIAL HISTORY, ALLERGIES AND HOME MEDICATIONS, REVIEW OF SYSTEMS, PHYSICAL EXAM, and any PROGRESS NOTES during the time I functioned as the attending physician for this patient.

## 2019-01-04 NOTE — ED PROVIDER NOTE - OBJECTIVE STATEMENT
Dr. Ma: 83F h/o chronic pain all over body from OA and gout, h/o DM, CAD p/w exacerbation of her OA pain. Complains of pain in every joint in her body. Pt states she usually ambulates with a walker. Lives alone but has neighbors in her apt complex. No trauma, falls, fevers or chills.

## 2019-02-05 NOTE — ED ADULT NURSE NOTE - NS ED PATIENT SAFETY CONCERN
Detail Level: Zone Continue Regimen: Clobetasol Oint BID PRN flare, Elidel Cream PRN mild flares Self No

## 2019-05-25 NOTE — ED ADULT NURSE NOTE - NEURO WDL
Alert and oriented to person, place and time, memory intact, behavior appropriate to situation, PERRL. Home

## 2019-05-28 ENCOUNTER — EMERGENCY (EMERGENCY)
Facility: HOSPITAL | Age: 84
LOS: 1 days | Discharge: HOME | End: 2019-05-28
Attending: EMERGENCY MEDICINE | Admitting: EMERGENCY MEDICINE
Payer: MEDICARE

## 2019-05-28 VITALS
HEART RATE: 50 BPM | DIASTOLIC BLOOD PRESSURE: 73 MMHG | OXYGEN SATURATION: 98 % | TEMPERATURE: 98 F | WEIGHT: 240.08 LBS | HEIGHT: 66 IN | RESPIRATION RATE: 16 BRPM | SYSTOLIC BLOOD PRESSURE: 133 MMHG

## 2019-05-28 VITALS
RESPIRATION RATE: 17 BRPM | SYSTOLIC BLOOD PRESSURE: 137 MMHG | OXYGEN SATURATION: 99 % | HEART RATE: 53 BPM | DIASTOLIC BLOOD PRESSURE: 66 MMHG

## 2019-05-28 DIAGNOSIS — R20.8 OTHER DISTURBANCES OF SKIN SENSATION: ICD-10-CM

## 2019-05-28 LAB
ALBUMIN SERPL ELPH-MCNC: 3.2 G/DL — LOW (ref 3.3–5)
ALP SERPL-CCNC: 88 U/L — SIGNIFICANT CHANGE UP (ref 40–120)
ALT FLD-CCNC: 13 U/L DA — SIGNIFICANT CHANGE UP (ref 10–45)
ANION GAP SERPL CALC-SCNC: 11 MMOL/L — SIGNIFICANT CHANGE UP (ref 5–17)
APPEARANCE UR: CLEAR — SIGNIFICANT CHANGE UP
AST SERPL-CCNC: 20 U/L — SIGNIFICANT CHANGE UP (ref 10–40)
BASOPHILS # BLD AUTO: 0.02 K/UL — SIGNIFICANT CHANGE UP (ref 0–0.2)
BASOPHILS NFR BLD AUTO: 0.4 % — SIGNIFICANT CHANGE UP (ref 0–2)
BILIRUB SERPL-MCNC: 0.3 MG/DL — SIGNIFICANT CHANGE UP (ref 0.2–1.2)
BILIRUB UR-MCNC: NEGATIVE — SIGNIFICANT CHANGE UP
BUN SERPL-MCNC: 13 MG/DL — SIGNIFICANT CHANGE UP (ref 7–23)
CALCIUM SERPL-MCNC: 9.1 MG/DL — SIGNIFICANT CHANGE UP (ref 8.4–10.5)
CHLORIDE SERPL-SCNC: 108 MMOL/L — SIGNIFICANT CHANGE UP (ref 96–108)
CO2 SERPL-SCNC: 26 MMOL/L — SIGNIFICANT CHANGE UP (ref 22–31)
COLOR SPEC: YELLOW — SIGNIFICANT CHANGE UP
CREAT SERPL-MCNC: 1.27 MG/DL — SIGNIFICANT CHANGE UP (ref 0.5–1.3)
DIFF PNL FLD: NEGATIVE — SIGNIFICANT CHANGE UP
EOSINOPHIL # BLD AUTO: 0.09 K/UL — SIGNIFICANT CHANGE UP (ref 0–0.5)
EOSINOPHIL NFR BLD AUTO: 1.6 % — SIGNIFICANT CHANGE UP (ref 0–6)
GLUCOSE SERPL-MCNC: 104 MG/DL — HIGH (ref 70–99)
GLUCOSE UR QL: NEGATIVE — SIGNIFICANT CHANGE UP
HCT VFR BLD CALC: 36.4 % — SIGNIFICANT CHANGE UP (ref 34.5–45)
HGB BLD-MCNC: 12.2 G/DL — SIGNIFICANT CHANGE UP (ref 11.5–15.5)
IMM GRANULOCYTES NFR BLD AUTO: 0.2 % — SIGNIFICANT CHANGE UP (ref 0–1.5)
KETONES UR-MCNC: NEGATIVE — SIGNIFICANT CHANGE UP
LEUKOCYTE ESTERASE UR-ACNC: NEGATIVE — SIGNIFICANT CHANGE UP
LYMPHOCYTES # BLD AUTO: 3.11 K/UL — SIGNIFICANT CHANGE UP (ref 1–3.3)
LYMPHOCYTES # BLD AUTO: 54.6 % — HIGH (ref 13–44)
MCHC RBC-ENTMCNC: 29.8 PG — SIGNIFICANT CHANGE UP (ref 27–34)
MCHC RBC-ENTMCNC: 33.5 GM/DL — SIGNIFICANT CHANGE UP (ref 32–36)
MCV RBC AUTO: 89 FL — SIGNIFICANT CHANGE UP (ref 80–100)
MONOCYTES # BLD AUTO: 0.47 K/UL — SIGNIFICANT CHANGE UP (ref 0–0.9)
MONOCYTES NFR BLD AUTO: 8.2 % — SIGNIFICANT CHANGE UP (ref 2–14)
NEUTROPHILS # BLD AUTO: 2 K/UL — SIGNIFICANT CHANGE UP (ref 1.8–7.4)
NEUTROPHILS NFR BLD AUTO: 35 % — LOW (ref 43–77)
NITRITE UR-MCNC: NEGATIVE — SIGNIFICANT CHANGE UP
NRBC # BLD: 0 /100 WBCS — SIGNIFICANT CHANGE UP (ref 0–0)
PH UR: 6 — SIGNIFICANT CHANGE UP (ref 5–8)
PLATELET # BLD AUTO: 175 K/UL — SIGNIFICANT CHANGE UP (ref 150–400)
POTASSIUM SERPL-MCNC: 3.6 MMOL/L — SIGNIFICANT CHANGE UP (ref 3.5–5.3)
POTASSIUM SERPL-SCNC: 3.6 MMOL/L — SIGNIFICANT CHANGE UP (ref 3.5–5.3)
PROT SERPL-MCNC: 7.9 G/DL — SIGNIFICANT CHANGE UP (ref 6–8.3)
PROT UR-MCNC: NEGATIVE — SIGNIFICANT CHANGE UP
RBC # BLD: 4.09 M/UL — SIGNIFICANT CHANGE UP (ref 3.8–5.2)
RBC # FLD: 13.8 % — SIGNIFICANT CHANGE UP (ref 10.3–14.5)
SODIUM SERPL-SCNC: 145 MMOL/L — SIGNIFICANT CHANGE UP (ref 135–145)
SP GR SPEC: 1.01 — SIGNIFICANT CHANGE UP (ref 1.01–1.02)
UROBILINOGEN FLD QL: NEGATIVE — SIGNIFICANT CHANGE UP
WBC # BLD: 5.7 K/UL — SIGNIFICANT CHANGE UP (ref 3.8–10.5)
WBC # FLD AUTO: 5.7 K/UL — SIGNIFICANT CHANGE UP (ref 3.8–10.5)

## 2019-05-28 PROCEDURE — 99284 EMERGENCY DEPT VISIT MOD MDM: CPT

## 2019-05-28 PROCEDURE — 99283 EMERGENCY DEPT VISIT LOW MDM: CPT

## 2019-05-28 PROCEDURE — 87086 URINE CULTURE/COLONY COUNT: CPT

## 2019-05-28 PROCEDURE — 80053 COMPREHEN METABOLIC PANEL: CPT

## 2019-05-28 PROCEDURE — 85027 COMPLETE CBC AUTOMATED: CPT

## 2019-05-28 RX ORDER — SODIUM CHLORIDE 9 MG/ML
1000 INJECTION INTRAMUSCULAR; INTRAVENOUS; SUBCUTANEOUS ONCE
Refills: 0 | Status: DISCONTINUED | OUTPATIENT
Start: 2019-05-28 | End: 2019-06-01

## 2019-05-28 NOTE — ED ADULT TRIAGE NOTE - HEIGHT IN FEET
Dr. David/Liss's pt.  Appt. With you on 1/11/19.......unscheduled remote on bivpm received on 1/5/19 and viewed on 1/7/19.  Normal bivpm function.  Battery=8 yr. 7 mo.   Presenting egm, bivpaced @ 72bpm and chronic AF.   Bivpaced=98%  Since last remote check on 11/7/18, 3 nonsustained VT, all 1 sec.  Last and Longest was on 1/2/19 @ 5:11am, 8 beats @ 171bpm.  Next remote braydon. for 4/8/19 and then clinic appt. with Dr. David in July, 2019.  I called and left a vm for daughter, Magui.  Thanks. Bernarda   5

## 2019-05-28 NOTE — ED PROVIDER NOTE - OBJECTIVE STATEMENT
Patient presents c/o gritty feeling on hands. She notes that she opened up a new remote and she doesn't know if there was a substance in the packaging but she has a gritty feeling on the hands. She tried washing it but it still feels gritty. She lives alone. She is . She does not have any other complaints. No abd pain or vomiting. No chest pain or SOB. No headache or visual changes.  She is A&O x 2.5 - she knows where she is, her name, what month (but not year) and who the president is. She communicates well but it is unclear what dust was in this packaging and what "gritty" feeling she tried to wash off for 1 hr at home.

## 2019-05-28 NOTE — ED PROVIDER NOTE - SKIN, MLM
Skin normal color for race, warm, dry and intact. No evidence of rash. Skin appears normal and without abnormal findings. No sign of cellulitis. EMS placed their IV in the right forearm of the patient.

## 2019-05-28 NOTE — ED PROVIDER NOTE - NSFOLLOWUPINSTRUCTIONS_ED_ALL_ED_FT
Worsening, continued or ANY new concerning symptoms return to the emergency department.  Follow up with your PMD. Show copies of your reports given to you. Take all of your medications as previously prescribed.

## 2019-05-28 NOTE — ED PROVIDER NOTE - CHIEF COMPLAINT
The patient is a 83y Female complaining of The patient is a 83y Female complaining of "gritty feeling on hands"

## 2019-05-28 NOTE — ED ADULT TRIAGE NOTE - CHIEF COMPLAINT QUOTE
pt states she has "dust on her arms from her tv remote", per ems pt was cleaning her arms x 1 hour.  pt also states she was "pulling furniture around and cleaning" pta.  Pt is a/ox2, afebrile in triage, denies cp, n/v/d.

## 2019-05-28 NOTE — ED PROVIDER NOTE - CLINICAL SUMMARY MEDICAL DECISION MAKING FREE TEXT BOX
Patient presents c/o gritty feeling on hands. She notes that she opened up a new remote and she doesn't know if there was a substance in the packaging but she has a gritty feeling on the hands. She tried washing it but it still feels gritty. She lives alone. She is . She does not have any other complaints. No abd pain or vomiting. No chest pain or SOB. No headache or visual changes.  She is A&O x 2.5 - she knows where she is, her name, what month (but not year) and who the president is. She communicates well but it is unclear what dust was in this packaging and what "gritty" feeling she tried to wash off for 1 hr at home. No findings on examination to the skin. Abd soft & nontender. A&O x 2.5. Will check labs and UA.     Normal labs and urine. Patient comfortable meanwhile. Plan for d/c to home without pt f/u prn. She has her home keys and a voucher used to contact a cab for safe transport to home.

## 2019-05-29 LAB
CULTURE RESULTS: NO GROWTH — SIGNIFICANT CHANGE UP
SPECIMEN SOURCE: SIGNIFICANT CHANGE UP

## 2019-08-03 ENCOUNTER — EMERGENCY (EMERGENCY)
Facility: HOSPITAL | Age: 84
LOS: 1 days | Discharge: HOME | End: 2019-08-03
Attending: EMERGENCY MEDICINE | Admitting: EMERGENCY MEDICINE
Payer: MEDICARE

## 2019-08-03 VITALS
RESPIRATION RATE: 17 BRPM | HEIGHT: 65 IN | OXYGEN SATURATION: 95 % | TEMPERATURE: 98 F | WEIGHT: 195.11 LBS | SYSTOLIC BLOOD PRESSURE: 156 MMHG | DIASTOLIC BLOOD PRESSURE: 69 MMHG | HEART RATE: 54 BPM

## 2019-08-03 VITALS
DIASTOLIC BLOOD PRESSURE: 70 MMHG | OXYGEN SATURATION: 99 % | RESPIRATION RATE: 16 BRPM | TEMPERATURE: 98 F | HEART RATE: 51 BPM | SYSTOLIC BLOOD PRESSURE: 154 MMHG

## 2019-08-03 PROCEDURE — 99283 EMERGENCY DEPT VISIT LOW MDM: CPT

## 2019-08-03 RX ORDER — OXYCODONE AND ACETAMINOPHEN 5; 325 MG/1; MG/1
1 TABLET ORAL ONCE
Refills: 0 | Status: DISCONTINUED | OUTPATIENT
Start: 2019-08-03 | End: 2019-08-03

## 2019-08-03 RX ADMIN — OXYCODONE AND ACETAMINOPHEN 1 TABLET(S): 5; 325 TABLET ORAL at 20:20

## 2019-08-03 RX ADMIN — OXYCODONE AND ACETAMINOPHEN 1 TABLET(S): 5; 325 TABLET ORAL at 21:40

## 2019-08-03 NOTE — ED PROVIDER NOTE - CLINICAL SUMMARY MEDICAL DECISION MAKING FREE TEXT BOX
h/o rheumatoid arthritis pw atraumatic pain to the posterior B/L knees with no obvious findings on exam- percocet, Ortho follow up

## 2019-08-03 NOTE — ED PROVIDER NOTE - ATTENDING CONTRIBUTION TO CARE
I have personally seen and examined this patient. I have fully participated in the care of this patient. I have reviewed all pertinent clinical information, including history physical exam, plan and the PA's note and agree except as noted  84 y/o Obese F with h/o MI, DM, CAD, Rheumatoid arthritis pw pain behind B/L knees. H/o chronic pain. Denies weakness, numbness, tingling, fever, chills, nausea, vomiting, headache, swelling, chest pain, shortness of breath. Denies recent trauma/injury.  Pt has h/o chronic osteoarthritis and has multiple visits toe r for pain in knees  PMD- Ana

## 2019-08-03 NOTE — ED ADULT NURSE REASSESSMENT NOTE - NS ED NURSE REASSESS COMMENT FT1
Care of pt received from Elizabeth VALENCIA. Pt currently awaiting transportation home at this time. Call bell within reach. Will continue to monitor.

## 2019-08-03 NOTE — ED PROVIDER NOTE - OBJECTIVE STATEMENT
84 y/o Obese F with h/o MI, DM, CAD, Rheumatoid arthritis pw pain behind B/L knees. H/o chronic pain. Denies weakness, numbness, tingling, 82 y/o Obese F with h/o MI, DM, CAD, Rheumatoid arthritis pw pain behind B/L knees. H/o chronic pain. Denies weakness, numbness, tingling, fever, chills, nausea, vomiting, headache, swelling, chest pain, shortness of breath. Denies recent trauma/injury.   PMGLENIS- Ana

## 2019-08-03 NOTE — ED PROVIDER NOTE - NSFOLLOWUPINSTRUCTIONS_ED_ALL_ED_FT
Follow up with your PMD within 48-72 hrs  Take all of your medications as previously prescribed.   Take Tylenol 650mg every 4-6 hours as needed for pain   Percocet 1 tablet every 6 hrs as needed for breakthrough discomfort- caution drowsiness while taking this medication- do not drive or operate heavy machinery.   Worsening, continued or ANY new concerning symptoms return to the emergency department.

## 2019-08-03 NOTE — ED ADULT NURSE NOTE - NSIMPLEMENTINTERV_GEN_ALL_ED
Implemented All Universal Safety Interventions:  [place] to call system. Call bell, personal items and telephone within reach. Instruct patient to call for assistance. Room bathroom lighting operational. Non-slip footwear when patient is off stretcher. Physically safe environment: no spills, clutter or unnecessary equipment. Stretcher in lowest position, wheels locked, appropriate side rails in place.

## 2019-08-13 NOTE — ED ADULT TRIAGE NOTE - PAIN RATING/NUMBER SCALE (0-10): ACTIVITY
[facility] ATHLETIC [facility] PHYSICAL [facility]  [address]  [facility] [place] [id number] [phone]    2019    Re: Chester Palacios   :   1943  MRN:  [id number]   REFERRING PHYSICIAN:   Armen Rosas    [facility]  Date of Initial Evaluation:  3/15/19  Visits:  Rxs Used: 14  Reason for Referral:  Acute pain of left shoulder    PROGRESS  REPORT  Progress reporting period is from 19 to 19.       SUBJECTIVE   Subjective: Reports he is feeling better. Has been riding bike 2-3 miles with no pain    Current Pain level: /10.     Previous pain level was  3/10  .   Changes in function:  Yes (See Goal flowsheet attached for changes in current functional level)  Adverse reaction to treatment or activity: None    OBJECTIVE  Changes noted in objective findings:  The objective findings below are from DOS 19.  Objective: Thinks the past few days have been better he was on a canoe trip and thought he was uncomfortable. Sometimes the exs can be irritating but not as much recently. AROM no pain except for abd at around 110 dg. PROM painfree..Pt is tolerating the exercise program better. He is doing his exs 2x day I cut them back to 1x day except posterior capsule. He wants to swim but I instructed him that he needs full painfree rom in abduction and flexion before he can try that again.      ASSESSMENT/PLAN  Updated problem list and treatment plan: Diagnosis 1:  L shoulder pain  Pain -  hot/cold therapy and manual therapy  Decreased ROM/flexibility - manual therapy and therapeutic exercise  Decreased strength - therapeutic exercise and therapeutic activities  Impaired muscle performance - neuro re-education  Decreased function - therapeutic activities  STG/LTGs have been met or progress has been made towards goals:  Yes (See Goal flow sheet completed today.)  Assessment of Progress: The patient's condition is  improving.  Self Management Plans:  Patient has been instructed in a home treatment program.  I have re-evaluated this patient and find that the nature, scope, duration and intensity of the therapy is appropriate for the medical condition of the patient.  Chester continues to require the following intervention to meet STG and LTG's:  PT    Recommendations:  This patient would benefit from continued therapy.     Frequency:  2 X a month, once daily  Duration:  for 2 months        Re: Chester Palacios   :   1943      Please refer to the daily flowsheet for treatment today, total treatment time and time spent performing 1:1 timed codes.    Thank you for your referral.    INQUIRIES  Therapist: Yessi David, PT   [facility] PHYSICAL THERAPY  [address] [place] [address] [id number]  [facility] [address]  Phone: [phone]  Fax: [phone]      8

## 2019-10-12 ENCOUNTER — EMERGENCY (EMERGENCY)
Facility: HOSPITAL | Age: 84
LOS: 1 days | Discharge: HOME | End: 2019-10-12
Attending: EMERGENCY MEDICINE | Admitting: EMERGENCY MEDICINE
Payer: MEDICARE

## 2019-10-12 VITALS
RESPIRATION RATE: 18 BRPM | TEMPERATURE: 98 F | SYSTOLIC BLOOD PRESSURE: 130 MMHG | OXYGEN SATURATION: 100 % | HEART RATE: 77 BPM | DIASTOLIC BLOOD PRESSURE: 60 MMHG | HEIGHT: 65 IN | WEIGHT: 169.98 LBS

## 2019-10-12 VITALS
DIASTOLIC BLOOD PRESSURE: 71 MMHG | RESPIRATION RATE: 18 BRPM | TEMPERATURE: 98 F | OXYGEN SATURATION: 98 % | SYSTOLIC BLOOD PRESSURE: 128 MMHG | HEART RATE: 64 BPM

## 2019-10-12 PROCEDURE — 73562 X-RAY EXAM OF KNEE 3: CPT | Mod: 26,RT

## 2019-10-12 PROCEDURE — 93971 EXTREMITY STUDY: CPT | Mod: 26,RT

## 2019-10-12 PROCEDURE — 99284 EMERGENCY DEPT VISIT MOD MDM: CPT

## 2019-10-12 RX ORDER — ACETAMINOPHEN 500 MG
650 TABLET ORAL ONCE
Refills: 0 | Status: COMPLETED | OUTPATIENT
Start: 2019-10-12 | End: 2019-10-12

## 2019-10-12 RX ORDER — KETOROLAC TROMETHAMINE 30 MG/ML
15 SYRINGE (ML) INJECTION ONCE
Refills: 0 | Status: DISCONTINUED | OUTPATIENT
Start: 2019-10-12 | End: 2019-10-12

## 2019-10-12 RX ADMIN — Medication 15 MILLIGRAM(S): at 19:15

## 2019-10-12 RX ADMIN — Medication 650 MILLIGRAM(S): at 21:22

## 2019-10-12 RX ADMIN — Medication 15 MILLIGRAM(S): at 18:43

## 2019-10-12 RX ADMIN — Medication 650 MILLIGRAM(S): at 20:22

## 2019-10-12 NOTE — ED ADULT NURSE NOTE - OBJECTIVE STATEMENT
pt reports knee pain on posterior aspect of right knee. Denies fall or injury to knee. Bilateral popliteal pulses present. No bruising or deformity noted to area.

## 2019-10-12 NOTE — ED ADULT TRIAGE NOTE - CHIEF COMPLAINT QUOTE
Pt BIB EMS from home with c/o right knee pain starting yesterday, but worse today. Pt denies fall or injury. Pt ambulatory with walker at baseline. Pt hx includes rheumatoid arthritis, gout, and HTN.

## 2019-10-12 NOTE — ED PROVIDER NOTE - PROGRESS NOTE DETAILS
no improvement in pain with toradol  will give tylenol, us right leg pending no improvement in pain with toradol  will give tylenol, us right leg pending.

## 2019-10-12 NOTE — ED PROVIDER NOTE - NS ED ROS FT
Except as otherwise indicated in HPI:  CONSTITUTIONAL: Neg  HEENT: neg  CV: neg  Resp: neg  GI: Neg  : Neg  MSK: +knee and leg pain  SKIN: Neg  NEURO: Neg  Heme/Onc: Neg

## 2019-10-12 NOTE — ED PROVIDER NOTE - OBJECTIVE STATEMENT
Pt with hx rheumatoid arthritis. C/o right popliteal and posterior thigh pain since earlier today. no swelling. no injury. no recent travel. no calf pain.

## 2019-10-12 NOTE — ED ADULT NURSE NOTE - CHPI ED NUR SYMPTOMS POS
Unfortunately, her specific insurance plan does not cover ANY biologics. The appeal was denied. I am forwarding it to our financial assistance team to see if there is anything she can qualify for.     Thanks,  Viviane Jara, PharmD  [facility]  [phone]      PAIN

## 2019-10-12 NOTE — ED ADULT NURSE NOTE - INTERVENTIONS DEFINITIONS
Physically safe environment: no spills, clutter or unnecessary equipment/Call bell, personal items and telephone within reach/[place] to call system/Instruct patient to call for assistance/Stretcher in lowest position, wheels locked, appropriate side rails in place

## 2019-10-12 NOTE — ED PROVIDER NOTE - PHYSICAL EXAMINATION
Gen:  alert, awake, no acute distress  Head:  atraumatic, normocephalic  MSK:  moving all extremities, rlw without deformity, +ttp medial joint line knee, no knee swelling, +ttp popliteal region and ttp right calf, no calf swelling  Neuro:  grossly intact, no focal deficits  Skin:  clear, dry, intact  Psych: AOx3, normal affect, no apparent risk to self or others

## 2019-10-12 NOTE — ED PROVIDER NOTE - PATIENT PORTAL LINK FT
You can access the FollowMyHealth Patient Portal offered by [facility] by registering at the following website: [email or web address]. By joining [facility]’s FollowMyHealth portal, you will also be able to view your health information using other applications (apps) compatible with our system.

## 2019-10-12 NOTE — ED PROVIDER NOTE - NSFOLLOWUPINSTRUCTIONS_ED_ALL_ED_FT
apply warm compress  continue your pain medication  follow up with your doctor in 1-2 days  return to ER if any concern

## 2019-10-13 ENCOUNTER — EMERGENCY (EMERGENCY)
Facility: HOSPITAL | Age: 84
LOS: 1 days | Discharge: HOME | End: 2019-10-13
Attending: EMERGENCY MEDICINE | Admitting: EMERGENCY MEDICINE

## 2019-10-13 VITALS
TEMPERATURE: 99 F | WEIGHT: 229.94 LBS | DIASTOLIC BLOOD PRESSURE: 83 MMHG | OXYGEN SATURATION: 99 % | HEIGHT: 66 IN | HEART RATE: 82 BPM | RESPIRATION RATE: 16 BRPM | SYSTOLIC BLOOD PRESSURE: 142 MMHG

## 2019-10-13 VITALS
SYSTOLIC BLOOD PRESSURE: 140 MMHG | TEMPERATURE: 98 F | RESPIRATION RATE: 17 BRPM | DIASTOLIC BLOOD PRESSURE: 78 MMHG | OXYGEN SATURATION: 98 % | HEART RATE: 80 BPM

## 2019-10-13 RX ORDER — KETOROLAC TROMETHAMINE 30 MG/ML
30 SYRINGE (ML) INJECTION ONCE
Refills: 0 | Status: DISCONTINUED | OUTPATIENT
Start: 2019-10-13 | End: 2019-10-13

## 2019-10-13 RX ADMIN — Medication 30 MILLIGRAM(S): at 13:24

## 2019-10-13 NOTE — ED ADULT NURSE NOTE - NSIMPLEMENTINTERV_GEN_ALL_ED
Implemented All Fall Risk Interventions:  [place] to call system. Call bell, personal items and telephone within reach. Instruct patient to call for assistance. Room bathroom lighting operational. Non-slip footwear when patient is off stretcher. Physically safe environment: no spills, clutter or unnecessary equipment. Stretcher in lowest position, wheels locked, appropriate side rails in place. Provide visual cue, wrist band, yellow gown, etc. Monitor gait and stability. Monitor for mental status changes and reorient to person, place, and time. Review medications for side effects contributing to fall risk. Reinforce activity limits and safety measures with patient and family.

## 2019-10-13 NOTE — ED PROVIDER NOTE - OBJECTIVE STATEMENT
Pt presents with right knee pain. seen for same yesterday, states persistent pain. had xray showeing severe arthritis, us negative for dvt. no swelling. no fever no chills. Pt asking for orthopedic.

## 2019-10-13 NOTE — ED ADULT TRIAGE NOTE - CHIEF COMPLAINT QUOTE
Pt BIB EMS -presents with right knee pain.  Pt was here for same complaint yesterday. Pt states she is unable to ambulate.

## 2019-10-13 NOTE — ED PROVIDER NOTE - CARE PROVIDER_API CALL
Matti Carlin)  Orthopaedic Surgery  [address]  [place] [address]  Phone: [phone]  Fax: [phone]  Follow Up Time:

## 2019-10-13 NOTE — ED ADULT NURSE REASSESSMENT NOTE - NS ED NURSE REASSESS COMMENT FT1
assumed nursing care from previous RN Isabela. The pt is hypotensive, Dr. sy made aware. IV fluids started on pt. pt is still mentating well and denies any symptoms. She does however have mild wheezing and junky lung sounds

## 2019-10-13 NOTE — ED ADULT NURSE NOTE - OBJECTIVE STATEMENT
pt presents to ED with c/o right knee pain x2 days. was here yesterday, DC'd, now back today with same complaints. pt states its difficult for her to ambulate. She also states she doesn't have her current list of meds with her. Unable to update medication hx

## 2019-10-13 NOTE — ED PROVIDER NOTE - PHYSICAL EXAMINATION
Gen:  alert, awake, no acute distress  Head:  atraumatic, normocephalic  MSK:  full rom right knee with pain, no warmth, ttp medial and lateral joint lines, no popliteal ttp,  no calf swelling or ttp  Neuro:  grossly intact, no focal deficits  Skin:  clear, dry, intact  Psych: AOx3, normal affect, no apparent risk to self or others

## 2019-10-13 NOTE — ED PROVIDER NOTE - NSFOLLOWUPINSTRUCTIONS_ED_ALL_ED_FT
Arthritis    WHAT YOU NEED TO KNOW:    Arthritis is pain or disease in one or more joints. There are many types of arthritis. Types such as rheumatoid arthritis cause inflammation in the joints. Other types wear away the cartilage between joints, such as osteoarthritis. This makes the bones of the joint rub together when you move the joint. An infection from bacteria, a virus, or a fungus can also cause arthritis. Your symptoms may be constant, or symptoms may come and go. Arthritis often gets worse over time and can cause permanent joint damage.    DISCHARGE INSTRUCTIONS:    Call your doctor or rheumatologist if:     You have a fever and severe joint pain or swelling.      You cannot move the affected joint.      You have severe joint pain you cannot tolerate.      You have a new or worsening rash.      Your pain or swelling does not get better with treatment.      You have questions or concerns about your condition or care.    Medicines:     Acetaminophen decreases pain and fever. It is available without a doctor's order. Ask how much to take and how often to take it. Follow directions. Read the labels of all other medicines you are using to see if they also contain acetaminophen, or ask your doctor or pharmacist. Acetaminophen can cause liver damage if not taken correctly. Do not use more than 4 grams (4,000 milligrams) total of acetaminophen in one day.       NSAIDs, such as ibuprofen, help decrease swelling, pain, and fever. This medicine is available with or without a doctor's order. NSAIDs can cause stomach bleeding or kidney problems in certain people. If you take blood thinner medicine, always ask your healthcare provider if NSAIDs are safe for you. Always read the medicine label and follow directions.      Steroids reduce swelling and pain.      Prescription pain medicine may be given. Ask your healthcare provider how to take this medicine safely. Some prescription pain medicines contain acetaminophen. Do not take other medicines that contain acetaminophen without talking to your healthcare provider. Too much acetaminophen may cause liver damage. Prescription pain medicine may cause constipation. Ask your healthcare provider how to prevent or treat constipation.       Take your medicine as directed. Contact your healthcare provider if you think your medicine is not helping or if you have side effects. Tell him of her if you are allergic to any medicine. Keep a list of the medicines, vitamins, and herbs you take. Include the amounts, and when and why you take them. Bring the list or the pill bottles to follow-up visits. Carry your medicine list with you in case of an emergency.    Manage your symptoms:     Rest your painful joint so it can heal. Your healthcare provider may recommend crutches or a walker if the affected joint is in a leg.      Apply ice or heat to the joint. Both can help decrease swelling and pain. Ice may also help prevent tissue damage. Use an ice pack, or put crushed ice in a plastic bag. Cover it with a towel and place it on your joint for 15 to 20 minutes every hour or as directed. You can apply heat for 20 minutes every 2 hours. Heat treatment includes hot packs or heat lamps.      Elevate your joint. Elevation helps reduce swelling and pain. Raise your joint above the level of your heart as often as you can. Prop your painful joint on pillows to keep it above your heart comfortably.Elevate Limb         Manage arthritis:     Talk to your healthcare providers about your arthritis medicines. Some medicines may only be needed when you have arthritis pain. You may need to take other medicines every day to prevent arthritis from getting worse. Your healthcare providers will help you understand all your medicines and when to take them. It is important to take the medicines as directed, even if you start to feel better. You can continue to have joint damage and inflammation even if you do not feel it.      Eat a variety of healthy foods. Healthy foods include fruits, vegetables, whole-grain breads, low-fat dairy products, beans, lean meats, and fish. Ask if you need to be on a special diet. A diet rich in calcium and vitamin D may decrease your risk of osteoporosis. Foods high in calcium include milk, cheese, broccoli, and tofu. Vitamin D may be found in meat, fish, fortified milk, cereal and bread. Ask if you need calcium or vitamin D supplements.       Go to physical or occupational therapy as directed. A physical therapist can teach you exercises to improve flexibility and range of motion. You may also be shown non-weight-bearing exercises that are safe for your joints, such as swimming. Exercise can help keep your joints flexible and reduce pain. An occupational therapist can help you learn to do your daily activities when your joints are stiff or sore.      Maintain a healthy weight. Extra weight puts increased pressure on your joints. Ask your healthcare provider what you should weigh. If you need to lose weight, he or she can help you create a weight loss program. Weight loss can help reduce pain and increase your ability to do your activities.      Wear flat or low-heeled shoes. This will help decrease pain and reduce pressure on your ankle, knee, and hip joints.      Do not smoke. Nicotine and other chemicals in cigarettes and cigars can damage your bones and joints. Ask your healthcare provider for information if you currently smoke and need help to quit. E-cigarettes or smokeless tobacco still contain nicotine. Talk to your healthcare provider before you use these products.     Support devices:     Orthotic shoes or insoles help support your feet when you walk.      Crutches, a cane, or a walker may help decrease your risk for falling. They also decrease stress on affected joints.      Devices to prevent falls include raised toilet seats and bathtub bars to help you get up from sitting. Handrails can be placed in areas where you need balance and support.      Devices to help with support and rest include splints to wear on your hands and a firm pillow while you sleep. Use a pillow that is firm enough to support your neck and head.    Use knee immobilizer  Follow up with Orthopaedic Specialist, Dr Carlin in 1-2 days  Return if warmth, fever, worse pain, or other symptoms

## 2019-10-13 NOTE — ED PROVIDER NOTE - NS ED ROS FT
Except as otherwise indicated in HPI:  CONSTITUTIONAL: Neg  HEENT: neg  CV: neg  Resp: neg  GI: Neg  : Neg  MSK: +knee pain  SKIN: Neg  NEURO: Neg  Heme/Onc: Neg

## 2019-10-14 ENCOUNTER — INPATIENT (INPATIENT)
Facility: HOSPITAL | Age: 84
LOS: 10 days | Discharge: NURSING, REHAB OR LONG-TERM CARE | DRG: 554 | End: 2019-10-25
Attending: HOSPITALIST | Admitting: HOSPITALIST
Payer: MEDICARE

## 2019-10-14 VITALS
TEMPERATURE: 98 F | DIASTOLIC BLOOD PRESSURE: 81 MMHG | OXYGEN SATURATION: 96 % | SYSTOLIC BLOOD PRESSURE: 158 MMHG | HEART RATE: 68 BPM | RESPIRATION RATE: 15 BRPM

## 2019-10-14 DIAGNOSIS — R26.2 DIFFICULTY IN WALKING, NOT ELSEWHERE CLASSIFIED: ICD-10-CM

## 2019-10-14 LAB
ALBUMIN SERPL ELPH-MCNC: 3 G/DL — LOW (ref 3.3–5)
ALP SERPL-CCNC: 158 U/L — HIGH (ref 40–120)
ALT FLD-CCNC: 8 U/L — LOW (ref 10–45)
ANION GAP SERPL CALC-SCNC: 10 MMOL/L — SIGNIFICANT CHANGE UP (ref 5–17)
AST SERPL-CCNC: 19 U/L — SIGNIFICANT CHANGE UP (ref 10–40)
BASOPHILS # BLD AUTO: 0.03 K/UL — SIGNIFICANT CHANGE UP (ref 0–0.2)
BASOPHILS NFR BLD AUTO: 0.5 % — SIGNIFICANT CHANGE UP (ref 0–2)
BILIRUB SERPL-MCNC: 0.6 MG/DL — SIGNIFICANT CHANGE UP (ref 0.2–1.2)
BUN SERPL-MCNC: 12 MG/DL — SIGNIFICANT CHANGE UP (ref 7–23)
CALCIUM SERPL-MCNC: 9.4 MG/DL — SIGNIFICANT CHANGE UP (ref 8.4–10.5)
CHLORIDE SERPL-SCNC: 108 MMOL/L — SIGNIFICANT CHANGE UP (ref 96–108)
CO2 SERPL-SCNC: 26 MMOL/L — SIGNIFICANT CHANGE UP (ref 22–31)
CREAT SERPL-MCNC: 1.34 MG/DL — HIGH (ref 0.5–1.3)
EOSINOPHIL # BLD AUTO: 0.04 K/UL — SIGNIFICANT CHANGE UP (ref 0–0.5)
EOSINOPHIL NFR BLD AUTO: 0.6 % — SIGNIFICANT CHANGE UP (ref 0–6)
GLUCOSE BLDC GLUCOMTR-MCNC: 122 MG/DL — HIGH (ref 70–99)
GLUCOSE BLDC GLUCOMTR-MCNC: 125 MG/DL — HIGH (ref 70–99)
GLUCOSE SERPL-MCNC: 122 MG/DL — HIGH (ref 70–99)
HCT VFR BLD CALC: 35.2 % — SIGNIFICANT CHANGE UP (ref 34.5–45)
HGB BLD-MCNC: 11.8 G/DL — SIGNIFICANT CHANGE UP (ref 11.5–15.5)
IMM GRANULOCYTES NFR BLD AUTO: 0.2 % — SIGNIFICANT CHANGE UP (ref 0–1.5)
LYMPHOCYTES # BLD AUTO: 2.58 K/UL — SIGNIFICANT CHANGE UP (ref 1–3.3)
LYMPHOCYTES # BLD AUTO: 39 % — SIGNIFICANT CHANGE UP (ref 13–44)
MCHC RBC-ENTMCNC: 29.6 PG — SIGNIFICANT CHANGE UP (ref 27–34)
MCHC RBC-ENTMCNC: 33.5 GM/DL — SIGNIFICANT CHANGE UP (ref 32–36)
MCV RBC AUTO: 88.2 FL — SIGNIFICANT CHANGE UP (ref 80–100)
MONOCYTES # BLD AUTO: 0.64 K/UL — SIGNIFICANT CHANGE UP (ref 0–0.9)
MONOCYTES NFR BLD AUTO: 9.7 % — SIGNIFICANT CHANGE UP (ref 2–14)
NEUTROPHILS # BLD AUTO: 3.32 K/UL — SIGNIFICANT CHANGE UP (ref 1.8–7.4)
NEUTROPHILS NFR BLD AUTO: 50 % — SIGNIFICANT CHANGE UP (ref 43–77)
NRBC # BLD: 0 /100 WBCS — SIGNIFICANT CHANGE UP (ref 0–0)
PLATELET # BLD AUTO: 198 K/UL — SIGNIFICANT CHANGE UP (ref 150–400)
POTASSIUM SERPL-MCNC: 3.9 MMOL/L — SIGNIFICANT CHANGE UP (ref 3.5–5.3)
POTASSIUM SERPL-SCNC: 3.9 MMOL/L — SIGNIFICANT CHANGE UP (ref 3.5–5.3)
PROT SERPL-MCNC: 8.2 G/DL — SIGNIFICANT CHANGE UP (ref 6–8.3)
RBC # BLD: 3.99 M/UL — SIGNIFICANT CHANGE UP (ref 3.8–5.2)
RBC # FLD: 13.9 % — SIGNIFICANT CHANGE UP (ref 10.3–14.5)
SODIUM SERPL-SCNC: 144 MMOL/L — SIGNIFICANT CHANGE UP (ref 135–145)
WBC # BLD: 6.62 K/UL — SIGNIFICANT CHANGE UP (ref 3.8–10.5)
WBC # FLD AUTO: 6.62 K/UL — SIGNIFICANT CHANGE UP (ref 3.8–10.5)

## 2019-10-14 PROCEDURE — 99223 1ST HOSP IP/OBS HIGH 75: CPT | Mod: AI

## 2019-10-14 PROCEDURE — 29505 APPLICATION LONG LEG SPLINT: CPT

## 2019-10-14 PROCEDURE — 71045 X-RAY EXAM CHEST 1 VIEW: CPT | Mod: 26

## 2019-10-14 PROCEDURE — 93010 ELECTROCARDIOGRAM REPORT: CPT

## 2019-10-14 PROCEDURE — 99285 EMERGENCY DEPT VISIT HI MDM: CPT | Mod: 25

## 2019-10-14 RX ORDER — ATORVASTATIN CALCIUM 80 MG/1
10 TABLET, FILM COATED ORAL AT BEDTIME
Refills: 0 | Status: DISCONTINUED | OUTPATIENT
Start: 2019-10-14 | End: 2019-10-15

## 2019-10-14 RX ORDER — LISINOPRIL 2.5 MG/1
2.5 TABLET ORAL DAILY
Refills: 0 | Status: DISCONTINUED | OUTPATIENT
Start: 2019-10-14 | End: 2019-10-15

## 2019-10-14 RX ORDER — DEXTROSE 50 % IN WATER 50 %
25 SYRINGE (ML) INTRAVENOUS ONCE
Refills: 0 | Status: DISCONTINUED | OUTPATIENT
Start: 2019-10-14 | End: 2019-10-25

## 2019-10-14 RX ORDER — INSULIN LISPRO 100/ML
VIAL (ML) SUBCUTANEOUS AT BEDTIME
Refills: 0 | Status: DISCONTINUED | OUTPATIENT
Start: 2019-10-14 | End: 2019-10-15

## 2019-10-14 RX ORDER — ACETAMINOPHEN 500 MG
975 TABLET ORAL ONCE
Refills: 0 | Status: COMPLETED | OUTPATIENT
Start: 2019-10-14 | End: 2019-10-14

## 2019-10-14 RX ORDER — OXYCODONE AND ACETAMINOPHEN 5; 325 MG/1; MG/1
1 TABLET ORAL EVERY 6 HOURS
Refills: 0 | Status: DISCONTINUED | OUTPATIENT
Start: 2019-10-14 | End: 2019-10-21

## 2019-10-14 RX ORDER — COLCHICINE 0.6 MG
0.6 TABLET ORAL DAILY
Refills: 0 | Status: DISCONTINUED | OUTPATIENT
Start: 2019-10-14 | End: 2019-10-18

## 2019-10-14 RX ORDER — GLUCAGON INJECTION, SOLUTION 0.5 MG/.1ML
1 INJECTION, SOLUTION SUBCUTANEOUS ONCE
Refills: 0 | Status: DISCONTINUED | OUTPATIENT
Start: 2019-10-14 | End: 2019-10-25

## 2019-10-14 RX ORDER — METOPROLOL TARTRATE 50 MG
50 TABLET ORAL
Refills: 0 | Status: DISCONTINUED | OUTPATIENT
Start: 2019-10-14 | End: 2019-10-15

## 2019-10-14 RX ORDER — SIMVASTATIN 20 MG/1
20 TABLET, FILM COATED ORAL AT BEDTIME
Refills: 0 | Status: DISCONTINUED | OUTPATIENT
Start: 2019-10-14 | End: 2019-10-14

## 2019-10-14 RX ORDER — DEXTROSE 50 % IN WATER 50 %
15 SYRINGE (ML) INTRAVENOUS ONCE
Refills: 0 | Status: DISCONTINUED | OUTPATIENT
Start: 2019-10-14 | End: 2019-10-25

## 2019-10-14 RX ORDER — MORPHINE SULFATE 50 MG/1
2 CAPSULE, EXTENDED RELEASE ORAL ONCE
Refills: 0 | Status: DISCONTINUED | OUTPATIENT
Start: 2019-10-14 | End: 2019-10-14

## 2019-10-14 RX ORDER — IBUPROFEN 200 MG
600 TABLET ORAL ONCE
Refills: 0 | Status: COMPLETED | OUTPATIENT
Start: 2019-10-14 | End: 2019-10-14

## 2019-10-14 RX ORDER — ASPIRIN/CALCIUM CARB/MAGNESIUM 324 MG
325 TABLET ORAL DAILY
Refills: 0 | Status: DISCONTINUED | OUTPATIENT
Start: 2019-10-14 | End: 2019-10-15

## 2019-10-14 RX ORDER — DEXTROSE 50 % IN WATER 50 %
12.5 SYRINGE (ML) INTRAVENOUS ONCE
Refills: 0 | Status: DISCONTINUED | OUTPATIENT
Start: 2019-10-14 | End: 2019-10-25

## 2019-10-14 RX ORDER — SENNA PLUS 8.6 MG/1
2 TABLET ORAL AT BEDTIME
Refills: 0 | Status: DISCONTINUED | OUTPATIENT
Start: 2019-10-14 | End: 2019-10-25

## 2019-10-14 RX ORDER — DOCUSATE SODIUM 100 MG
100 CAPSULE ORAL THREE TIMES A DAY
Refills: 0 | Status: DISCONTINUED | OUTPATIENT
Start: 2019-10-14 | End: 2019-10-25

## 2019-10-14 RX ORDER — SODIUM CHLORIDE 9 MG/ML
1000 INJECTION, SOLUTION INTRAVENOUS
Refills: 0 | Status: DISCONTINUED | OUTPATIENT
Start: 2019-10-14 | End: 2019-10-25

## 2019-10-14 RX ORDER — ACETAMINOPHEN 500 MG
650 TABLET ORAL EVERY 6 HOURS
Refills: 0 | Status: DISCONTINUED | OUTPATIENT
Start: 2019-10-14 | End: 2019-10-25

## 2019-10-14 RX ORDER — CLOPIDOGREL BISULFATE 75 MG/1
75 TABLET, FILM COATED ORAL DAILY
Refills: 0 | Status: DISCONTINUED | OUTPATIENT
Start: 2019-10-14 | End: 2019-10-25

## 2019-10-14 RX ORDER — INSULIN LISPRO 100/ML
VIAL (ML) SUBCUTANEOUS
Refills: 0 | Status: DISCONTINUED | OUTPATIENT
Start: 2019-10-14 | End: 2019-10-15

## 2019-10-14 RX ADMIN — OXYCODONE AND ACETAMINOPHEN 1 TABLET(S): 5; 325 TABLET ORAL at 19:15

## 2019-10-14 RX ADMIN — MORPHINE SULFATE 2 MILLIGRAM(S): 50 CAPSULE, EXTENDED RELEASE ORAL at 10:18

## 2019-10-14 RX ADMIN — Medication 650 MILLIGRAM(S): at 22:30

## 2019-10-14 RX ADMIN — ATORVASTATIN CALCIUM 10 MILLIGRAM(S): 80 TABLET, FILM COATED ORAL at 22:18

## 2019-10-14 RX ADMIN — OXYCODONE AND ACETAMINOPHEN 1 TABLET(S): 5; 325 TABLET ORAL at 18:32

## 2019-10-14 RX ADMIN — Medication 100 MILLIGRAM(S): at 22:19

## 2019-10-14 RX ADMIN — Medication 600 MILLIGRAM(S): at 10:53

## 2019-10-14 RX ADMIN — Medication 0.6 MILLIGRAM(S): at 22:18

## 2019-10-14 RX ADMIN — MORPHINE SULFATE 2 MILLIGRAM(S): 50 CAPSULE, EXTENDED RELEASE ORAL at 11:02

## 2019-10-14 RX ADMIN — Medication 975 MILLIGRAM(S): at 10:53

## 2019-10-14 RX ADMIN — Medication 650 MILLIGRAM(S): at 22:19

## 2019-10-14 RX ADMIN — Medication 600 MILLIGRAM(S): at 09:53

## 2019-10-14 RX ADMIN — Medication 975 MILLIGRAM(S): at 09:53

## 2019-10-14 NOTE — H&P ADULT - NSICDXPASTMEDICALHX_GEN_ALL_CORE_FT
PAST MEDICAL HISTORY:  CAD (coronary artery disease)     DM (diabetes mellitus)     HLD (hyperlipidemia)     Hypertension     MI (myocardial infarction)     Obesity     Pulmonary embolism     Rheumatoid arthritis     Thyroid disease

## 2019-10-14 NOTE — ED ADULT NURSE REASSESSMENT NOTE - NS ED NURSE REASSESS COMMENT FT1
Patient unsure of her exact medication history, the list is the one that was in the system.  patient did not improve after medication administration or PT consult, so is being admitted.

## 2019-10-14 NOTE — ED ADULT NURSE NOTE - NSIMPLEMENTINTERV_GEN_ALL_ED
Implemented All Fall Risk Interventions:  [place] to call system. Call bell, personal items and telephone within reach. Instruct patient to call for assistance. Room bathroom lighting operational. Non-slip footwear when patient is off stretcher. Physically safe environment: no spills, clutter or unnecessary equipment. Stretcher in lowest position, wheels locked, appropriate side rails in place. Provide visual cue, wrist band, yellow gown, etc. Monitor gait and stability. Monitor for mental status changes and reorient to person, place, and time. Review medications for side effects contributing to fall risk. Reinforce activity limits and safety measures with patient and family.

## 2019-10-14 NOTE — ED PROVIDER NOTE - PROGRESS NOTE DETAILS
pt eval and case management called pt eval /and case management called , dr belle arteaga aware about R lung mass

## 2019-10-14 NOTE — H&P ADULT - NSHPLABSRESULTS_GEN_ALL_CORE
LABS:                        11.8   6.62  )-----------( 198      ( 14 Oct 2019 10:45 )             35.2     10-14    144  |  108  |  12  ----------------------------<  122<H>  3.9   |  26  |  1.34<H>    Ca    9.4      14 Oct 2019 10:45    TPro  8.2  /  Alb  3.0<L>  /  TBili  0.6  /  DBili  x   /  AST  19  /  ALT  8<L>  /  AlkPhos  158<H>  10-14    CAPILLARY BLOOD GLUCOSE    RADIOLOGY & ADDITIONAL TESTS:    Consultant(s) Notes Reviewed:  [x ] YES  [ ] NO  Care Discussed with Consultants/Other Providers [ x] YES  [ ] NO  Imaging Personally Reviewed:  [ ] YES  [ ] NO

## 2019-10-14 NOTE — H&P ADULT - HISTORY OF PRESENT ILLNESS
83W PMH 83 y/o female with HTN, CAD, DM, RA, Obesity presents from home for unable to ambulate.  Patient states she has been having worsening right knee pain on lateral side for multiple days.  She states she lives alone and unable to ambulate.  She states she does have a history of RA and Gout.   Patient has NOT followed up with her primary care physician because Dr. Ivory no longer sees patient.   Denies trauma to knee.     Patient was seen approximately 1 year ago for similar, found to have a gout flare/RA flare.     Patient has NOT been taking her medications for several months.

## 2019-10-14 NOTE — ED ADULT NURSE REASSESSMENT NOTE - NS ED NURSE REASSESS COMMENT FT1
I spoke to Karen accepting nurse and MD Paul, still awaiting admit orders prior to transport to the unit,

## 2019-10-14 NOTE — H&P ADULT - ASSESSMENT
83 y/o female with HTN, CAD, DM, RA, Obesity presents from home for unable to ambulate.     Right knee pain   - osteoarthritis versus gout versus fracture  - may need additional imaging to rule out fracture   - check uric acid  - PT consult   - pain control / consider steroids for possible rheumatoid arthritis flare    Obesity  BMI (kg/m2): 37.1 (10-13-19 @ 11:27), 28.3 (10-12-19 @ 17:55)    Hypertension  - cont home meds    Rheumatoid arthritis  - not on chronic meds     IMPROVE VTE Individual Risk Assessment          RISK                                                          Points  [  ] Previous VTE                                                3  [  ] Thrombophilia                                             2  [  ] Lower limb paralysis                                   2        (unable to hold up >15 seconds)    [  ] Current Cancer                                             2         (within 6 months)  [  ] Immobilization > 24 hrs                              1  [  ] ICU/CCU stay > 24 hours                             1  [X  ] Age > 60                                                         1    IMPROVE VTE Score:         [   1      ]    Total Risk Factor Score:    0 - 1:   Consider IPC  >2 - 3:  Thromboprophylaxis required (enoxaparin or SQ heparin)        >4:   High Risk: Thromboprophylaxis required (enoxaparin or SQ heparin), optional add IPC  **If CONTRAINDICATION to enoxaparin or SQ heparin, USE IPCs** 81 y/o female with HTN, CAD, DM, RA, Obesity presents from home for unable to ambulate.     Right knee pain   Gout versus worsening osteoarthritis  - check uric acid  - start empiric Po prednisone for gout flare  - if does not improve, may need ct scan of knee   - PT consult   - pain control    Obesity  BMI (kg/m2): 37.1 (10-13-19 @ 11:27), 28.3 (10-12-19 @ 17:55)    Hypertension  - cont home meds    Rheumatoid arthritis  - not on chronic meds     IMPROVE VTE Individual Risk Assessment          RISK                                                          Points  [  ] Previous VTE                                                3  [  ] Thrombophilia                                             2  [  ] Lower limb paralysis                                   2        (unable to hold up >15 seconds)    [  ] Current Cancer                                             2         (within 6 months)  [  ] Immobilization > 24 hrs                              1  [  ] ICU/CCU stay > 24 hours                             1  [X  ] Age > 60                                                         1    IMPROVE VTE Score:         [   1      ]    Total Risk Factor Score:    0 - 1:   Consider IPC  >2 - 3:  Thromboprophylaxis required (enoxaparin or SQ heparin)        >4:   High Risk: Thromboprophylaxis required (enoxaparin or SQ heparin), optional add IPC  **If CONTRAINDICATION to enoxaparin or SQ heparin, USE IPCs**

## 2019-10-14 NOTE — ED CLERICAL - CLERICAL COMMENTS
Spoke w/Karen- no beds are available at this time, she will call her boss and call back. Spoke w/Karen- no beds are available at this time, she will call her boss and call back- can go to  when clean

## 2019-10-14 NOTE — H&P ADULT - NSHPPHYSICALEXAM_GEN_ALL_CORE
T(C): 36.7 (10-14-19 @ 16:59), Max: 36.9 (10-14-19 @ 12:10)  HR: 60 (10-14-19 @ 16:59) (54 - 68)  BP: 111/56 (10-14-19 @ 16:59) (111/56 - 158/81)  RR: 18 (10-14-19 @ 16:59) (15 - 18)  SpO2: 99% (10-14-19 @ 16:59) (96% - 99%)  Wt(kg): --Vital Signs Last 24 Hrs  T(C): 36.7 (14 Oct 2019 16:59), Max: 36.9 (14 Oct 2019 12:10)  T(F): 98 (14 Oct 2019 16:59), Max: 98.5 (14 Oct 2019 12:10)  HR: 60 (14 Oct 2019 16:59) (54 - 68)  BP: 111/56 (14 Oct 2019 16:59) (111/56 - 158/81)  BP(mean): --  RR: 18 (14 Oct 2019 16:59) (15 - 18)  SpO2: 99% (14 Oct 2019 16:59) (96% - 99%)    PHYSICAL EXAM:  GENERAL: NAD, well-groomed, well-developed  HEAD:  Atraumatic, Normocephalic  EYES: EOMI, PERRLA, conjunctiva and sclera clear  ENMT: No tonsillar erythema, exudates, or enlargement; Moist mucous membranes, Good dentition, No lesions  NECK: Supple, No JVD, Normal thyroid  NERVOUS SYSTEM:  Alert & Oriented X3, Good concentration; Motor Strength 5/5 B/L upper and lower extremities; DTRs 2+ intact and symmetric  CHEST/LUNG: Clear to percussion bilaterally; No rales, rhonchi, wheezing, or rubs  HEART: Regular rate and rhythm; No murmurs, rubs, or gallops  ABDOMEN: Soft, Nontender, Nondistended; Bowel sounds present  EXTREMITIES:  2+ Peripheral Pulses, No clubbing, cyanosis, or edema  LYMPH: No lymphadenopathy noted  SKIN: No rashes or lesions T(C): 36.7 (10-14-19 @ 16:59), Max: 36.9 (10-14-19 @ 12:10)  HR: 60 (10-14-19 @ 16:59) (54 - 68)  BP: 111/56 (10-14-19 @ 16:59) (111/56 - 158/81)  RR: 18 (10-14-19 @ 16:59) (15 - 18)  SpO2: 99% (10-14-19 @ 16:59) (96% - 99%)  Wt(kg): --Vital Signs Last 24 Hrs  T(C): 36.7 (14 Oct 2019 16:59), Max: 36.9 (14 Oct 2019 12:10)  T(F): 98 (14 Oct 2019 16:59), Max: 98.5 (14 Oct 2019 12:10)  HR: 60 (14 Oct 2019 16:59) (54 - 68)  BP: 111/56 (14 Oct 2019 16:59) (111/56 - 158/81)  BP(mean): --  RR: 18 (14 Oct 2019 16:59) (15 - 18)  SpO2: 99% (14 Oct 2019 16:59) (96% - 99%)    PHYSICAL EXAM:  GENERAL: NAD, well-groomed, well-developed  HEAD:  Atraumatic, Normocephalic  EYES: EOMI, PERRLA, conjunctiva and sclera clear  ENMT: No tonsillar erythema, exudates, or enlargement; Moist mucous membranes, Good dentition, No lesions  NECK: Supple, No JVD, Normal thyroid  NERVOUS SYSTEM:  Alert & Oriented X3, Good concentration; Motor Strength 5/5 B/L upper and lower extremities; DTRs 2+ intact and symmetric  CHEST/LUNG: Clear to percussion bilaterally; No rales, rhonchi, wheezing, or rubs  HEART: Regular rate and rhythm; No murmurs, rubs, or gallops  ABDOMEN: Soft, Nontender, Nondistended; Bowel sounds present  EXTREMITIES:  2+ Peripheral Pulses, No clubbing, cyanosis, significant edema in right knee, especially above knee, right lateral knee TTP, limited ROM of knee  LYMPH: No lymphadenopathy noted  SKIN: No rashes or lesions

## 2019-10-15 LAB
ANION GAP SERPL CALC-SCNC: 10 MMOL/L — SIGNIFICANT CHANGE UP (ref 5–17)
BASOPHILS # BLD AUTO: 0.02 K/UL — SIGNIFICANT CHANGE UP (ref 0–0.2)
BASOPHILS NFR BLD AUTO: 0.3 % — SIGNIFICANT CHANGE UP (ref 0–2)
BUN SERPL-MCNC: 14 MG/DL — SIGNIFICANT CHANGE UP (ref 7–23)
CALCIUM SERPL-MCNC: 9.2 MG/DL — SIGNIFICANT CHANGE UP (ref 8.4–10.5)
CHLORIDE SERPL-SCNC: 106 MMOL/L — SIGNIFICANT CHANGE UP (ref 96–108)
CO2 SERPL-SCNC: 25 MMOL/L — SIGNIFICANT CHANGE UP (ref 22–31)
CREAT SERPL-MCNC: 1.25 MG/DL — SIGNIFICANT CHANGE UP (ref 0.5–1.3)
EOSINOPHIL # BLD AUTO: 0.05 K/UL — SIGNIFICANT CHANGE UP (ref 0–0.5)
EOSINOPHIL NFR BLD AUTO: 0.8 % — SIGNIFICANT CHANGE UP (ref 0–6)
GLUCOSE BLDC GLUCOMTR-MCNC: 106 MG/DL — HIGH (ref 70–99)
GLUCOSE BLDC GLUCOMTR-MCNC: 154 MG/DL — HIGH (ref 70–99)
GLUCOSE BLDC GLUCOMTR-MCNC: 172 MG/DL — HIGH (ref 70–99)
GLUCOSE SERPL-MCNC: 122 MG/DL — HIGH (ref 70–99)
HBA1C BLD-MCNC: 6.1 % — HIGH (ref 4–5.6)
HCT VFR BLD CALC: 34.9 % — SIGNIFICANT CHANGE UP (ref 34.5–45)
HGB BLD-MCNC: 11.6 G/DL — SIGNIFICANT CHANGE UP (ref 11.5–15.5)
IMM GRANULOCYTES NFR BLD AUTO: 0.2 % — SIGNIFICANT CHANGE UP (ref 0–1.5)
LYMPHOCYTES # BLD AUTO: 2.77 K/UL — SIGNIFICANT CHANGE UP (ref 1–3.3)
LYMPHOCYTES # BLD AUTO: 45.7 % — HIGH (ref 13–44)
MCHC RBC-ENTMCNC: 29.4 PG — SIGNIFICANT CHANGE UP (ref 27–34)
MCHC RBC-ENTMCNC: 33.2 GM/DL — SIGNIFICANT CHANGE UP (ref 32–36)
MCV RBC AUTO: 88.4 FL — SIGNIFICANT CHANGE UP (ref 80–100)
MONOCYTES # BLD AUTO: 0.57 K/UL — SIGNIFICANT CHANGE UP (ref 0–0.9)
MONOCYTES NFR BLD AUTO: 9.4 % — SIGNIFICANT CHANGE UP (ref 2–14)
NEUTROPHILS # BLD AUTO: 2.64 K/UL — SIGNIFICANT CHANGE UP (ref 1.8–7.4)
NEUTROPHILS NFR BLD AUTO: 43.6 % — SIGNIFICANT CHANGE UP (ref 43–77)
NRBC # BLD: 0 /100 WBCS — SIGNIFICANT CHANGE UP (ref 0–0)
PHOSPHATE SERPL-MCNC: 3.9 MG/DL — SIGNIFICANT CHANGE UP (ref 2.5–4.5)
PLATELET # BLD AUTO: 196 K/UL — SIGNIFICANT CHANGE UP (ref 150–400)
POTASSIUM SERPL-MCNC: 3.9 MMOL/L — SIGNIFICANT CHANGE UP (ref 3.5–5.3)
POTASSIUM SERPL-SCNC: 3.9 MMOL/L — SIGNIFICANT CHANGE UP (ref 3.5–5.3)
RBC # BLD: 3.95 M/UL — SIGNIFICANT CHANGE UP (ref 3.8–5.2)
RBC # FLD: 13.5 % — SIGNIFICANT CHANGE UP (ref 10.3–14.5)
SODIUM SERPL-SCNC: 141 MMOL/L — SIGNIFICANT CHANGE UP (ref 135–145)
URATE SERPL-MCNC: 7.6 MG/DL — HIGH (ref 2.5–7)
WBC # BLD: 6.06 K/UL — SIGNIFICANT CHANGE UP (ref 3.8–10.5)
WBC # FLD AUTO: 6.06 K/UL — SIGNIFICANT CHANGE UP (ref 3.8–10.5)

## 2019-10-15 PROCEDURE — 99233 SBSQ HOSP IP/OBS HIGH 50: CPT

## 2019-10-15 PROCEDURE — 73700 CT LOWER EXTREMITY W/O DYE: CPT | Mod: 26,RT

## 2019-10-15 RX ORDER — LISINOPRIL 2.5 MG/1
2.5 TABLET ORAL DAILY
Refills: 0 | Status: DISCONTINUED | OUTPATIENT
Start: 2019-10-16 | End: 2019-10-17

## 2019-10-15 RX ORDER — ASPIRIN/CALCIUM CARB/MAGNESIUM 324 MG
81 TABLET ORAL DAILY
Refills: 0 | Status: DISCONTINUED | OUTPATIENT
Start: 2019-10-16 | End: 2019-10-25

## 2019-10-15 RX ORDER — ASPIRIN/CALCIUM CARB/MAGNESIUM 324 MG
81 TABLET ORAL DAILY
Refills: 0 | Status: DISCONTINUED | OUTPATIENT
Start: 2019-10-15 | End: 2019-10-15

## 2019-10-15 RX ORDER — ATORVASTATIN CALCIUM 80 MG/1
40 TABLET, FILM COATED ORAL AT BEDTIME
Refills: 0 | Status: DISCONTINUED | OUTPATIENT
Start: 2019-10-15 | End: 2019-10-25

## 2019-10-15 RX ORDER — FAMOTIDINE 10 MG/ML
20 INJECTION INTRAVENOUS DAILY
Refills: 0 | Status: DISCONTINUED | OUTPATIENT
Start: 2019-10-15 | End: 2019-10-18

## 2019-10-15 RX ORDER — METOPROLOL TARTRATE 50 MG
25 TABLET ORAL
Refills: 0 | Status: DISCONTINUED | OUTPATIENT
Start: 2019-10-15 | End: 2019-10-17

## 2019-10-15 RX ORDER — ENOXAPARIN SODIUM 100 MG/ML
40 INJECTION SUBCUTANEOUS DAILY
Refills: 0 | Status: DISCONTINUED | OUTPATIENT
Start: 2019-10-15 | End: 2019-10-18

## 2019-10-15 RX ADMIN — CLOPIDOGREL BISULFATE 75 MILLIGRAM(S): 75 TABLET, FILM COATED ORAL at 12:16

## 2019-10-15 RX ADMIN — Medication 100 MILLIGRAM(S): at 21:22

## 2019-10-15 RX ADMIN — OXYCODONE AND ACETAMINOPHEN 1 TABLET(S): 5; 325 TABLET ORAL at 08:20

## 2019-10-15 RX ADMIN — Medication 100 MILLIGRAM(S): at 06:25

## 2019-10-15 RX ADMIN — FAMOTIDINE 20 MILLIGRAM(S): 10 INJECTION INTRAVENOUS at 17:35

## 2019-10-15 RX ADMIN — LISINOPRIL 2.5 MILLIGRAM(S): 2.5 TABLET ORAL at 06:25

## 2019-10-15 RX ADMIN — Medication 1: at 12:16

## 2019-10-15 RX ADMIN — Medication 20 MILLIGRAM(S): at 08:20

## 2019-10-15 RX ADMIN — Medication 100 MILLIGRAM(S): at 14:26

## 2019-10-15 RX ADMIN — ENOXAPARIN SODIUM 40 MILLIGRAM(S): 100 INJECTION SUBCUTANEOUS at 12:17

## 2019-10-15 RX ADMIN — Medication 25 MILLIGRAM(S): at 17:38

## 2019-10-15 RX ADMIN — OXYCODONE AND ACETAMINOPHEN 1 TABLET(S): 5; 325 TABLET ORAL at 09:10

## 2019-10-15 RX ADMIN — ATORVASTATIN CALCIUM 40 MILLIGRAM(S): 80 TABLET, FILM COATED ORAL at 21:22

## 2019-10-15 RX ADMIN — Medication 0.6 MILLIGRAM(S): at 12:16

## 2019-10-15 NOTE — PROGRESS NOTE ADULT - SUBJECTIVE AND OBJECTIVE BOX
Patient is a 83y old  Female who presents with a chief complaint of Knee pain/unable to ambulate (14 Oct 2019 16:47)      Interval HPI/ Overnight events: no overnight events    Patient seen and examined at bedside, complains of pain and swelling in the back of her right knee    REVIEW OF SYSTEMS:    CONSTITUTIONAL: No weakness, fevers or chills  EYES/ENT: No visual changes;  No vertigo or throat pain   NECK: No pain or stiffness  RESPIRATORY: No cough, wheezing, hemoptysis; No shortness of breath  CARDIOVASCULAR: No chest pain or palpitations  GASTROINTESTINAL: No abdominal or epigastric pain. No nausea, vomiting, or hematemesis; No diarrhea or constipation. No melena or hematochezia.  GENITOURINARY: No dysuria, frequency or hematuria  NEUROLOGICAL: No numbness or weakness  SKIN: No itching, burning, rashes, or lesions   MSK:  Right knee pain and swelling  All other review of systems is negative unless indicated above.      ALLERGIES:  sulfa drugs (Rash)    MEDICATIONS  (STANDING):  aspirin 325 milliGRAM(s) Oral daily  atorvastatin 10 milliGRAM(s) Oral at bedtime  clopidogrel Tablet 75 milliGRAM(s) Oral daily  colchicine 0.6 milliGRAM(s) Oral daily  dextrose 5%. 1000 milliLiter(s) (50 mL/Hr) IV Continuous <Continuous>  dextrose 50% Injectable 12.5 Gram(s) IV Push once  dextrose 50% Injectable 25 Gram(s) IV Push once  dextrose 50% Injectable 25 Gram(s) IV Push once  docusate sodium 100 milliGRAM(s) Oral three times a day  enoxaparin Injectable 40 milliGRAM(s) SubCutaneous daily  insulin lispro (HumaLOG) corrective regimen sliding scale   SubCutaneous three times a day before meals  insulin lispro (HumaLOG) corrective regimen sliding scale   SubCutaneous at bedtime  lisinopril 2.5 milliGRAM(s) Oral daily  metoprolol tartrate 50 milliGRAM(s) Oral two times a day  predniSONE   Tablet 20 milliGRAM(s) Oral daily    MEDICATIONS  (PRN):  acetaminophen   Tablet .. 650 milliGRAM(s) Oral every 6 hours PRN Mild Pain (1 - 3)  dextrose 40% Gel 15 Gram(s) Oral once PRN Blood Glucose LESS THAN 70 milliGRAM(s)/deciliter  glucagon  Injectable 1 milliGRAM(s) IntraMuscular once PRN Glucose LESS THAN 70 milligrams/deciliter  oxyCODONE    5 mG/acetaminophen 325 mG 1 Tablet(s) Oral every 6 hours PRN Moderate Pain (4 - 6)  senna 2 Tablet(s) Oral at bedtime PRN Constipation    Vital Signs Last 24 Hrs  T(F): 98.2 (15 Oct 2019 05:12), Max: 98.5 (14 Oct 2019 12:10)  HR: 54 (15 Oct 2019 05:12) (50 - 60)  BP: 117/55 (15 Oct 2019 05:12) (96/41 - 143/60)  RR: 16 (15 Oct 2019 05:12) (15 - 18)  SpO2: 96% (15 Oct 2019 05:12) (96% - 100%)  I&O's Summary      PHYSICAL EXAM:  General: NAD, well dressed, well nourished  Head: NT/AC  ENT: MMM, no oropharyngeal erythema or exudates  Neck: Supple, No JVD  Lungs: Clear to auscultation bilaterally, no wheezing, rales, or rhonchi, no accessory muscle use  Cardio: RRR, normal S1/S2, No M/R/G  Abdomen: Normoactive BS, Abdomen soft, nontender, nondistended; no organomegaly  Extremities: No calf tenderness, no clubbing or  cyanosis. Swelling of right knee with mild warmth, posterior knee also swollen and tender to touch. Limited ROM due to pain. No palpable cord. No erythema or open wounds.   Vascular: no LE edema  Lymph: no cervical LAD  Skin: warm and dry  Neuro: AAOx3, answers all questions appropriately, no focal deficits    LABS:                        11.6   6.06  )-----------( 196      ( 15 Oct 2019 06:40 )             34.9     10-15    141  |  106  |  14  ----------------------------<  122  3.9   |  25  |  1.25    Ca    9.2      15 Oct 2019 06:40  Phos  3.9     10-15    TPro  8.2  /  Alb  3.0  /  TBili  0.6  /  DBili  x   /  AST  19  /  ALT  8   /  AlkPhos  158  10-14    eGFR if Non African American: 40 mL/min/1.73M2 (10-15-19 @ 06:40)  eGFR if : 47 mL/min/1.73M2 (10-15-19 @ 06:40)                          POCT Blood Glucose.: 106 mg/dL (15 Oct 2019 07:48)  POCT Blood Glucose.: 122 mg/dL (14 Oct 2019 21:55)  POCT Blood Glucose.: 125 mg/dL (14 Oct 2019 17:57)    10-15 [id number] 6.1          RADIOLOGY & ADDITIONAL TESTS:    Care Discussed with Consultants/Other Providers: Patient is a 83y old  Female who presents with a chief complaint of Knee pain/unable to ambulate (14 Oct 2019 16:47)      Interval HPI/ Overnight events: no overnight events    Patient seen and examined at bedside, complains of pain and swelling in the back of her right knee    REVIEW OF SYSTEMS:    CONSTITUTIONAL: No weakness, fevers or chills  EYES/ENT: No visual changes;  No vertigo or throat pain   NECK: No pain or stiffness  RESPIRATORY: No cough, wheezing, hemoptysis; No shortness of breath  CARDIOVASCULAR: No chest pain or palpitations  GASTROINTESTINAL: No abdominal or epigastric pain. No nausea, vomiting, or hematemesis; No diarrhea or constipation. No melena or hematochezia.  GENITOURINARY: No dysuria, frequency or hematuria  NEUROLOGICAL: No numbness or weakness  SKIN: No itching, burning, rashes, or lesions   MSK:  Right knee pain and swelling  All other review of systems is negative unless indicated above.      ALLERGIES:  sulfa drugs (Rash)    MEDICATIONS  (STANDING):  aspirin 325 milliGRAM(s) Oral daily  atorvastatin 10 milliGRAM(s) Oral at bedtime  clopidogrel Tablet 75 milliGRAM(s) Oral daily  colchicine 0.6 milliGRAM(s) Oral daily  dextrose 5%. 1000 milliLiter(s) (50 mL/Hr) IV Continuous <Continuous>  dextrose 50% Injectable 12.5 Gram(s) IV Push once  dextrose 50% Injectable 25 Gram(s) IV Push once  dextrose 50% Injectable 25 Gram(s) IV Push once  docusate sodium 100 milliGRAM(s) Oral three times a day  enoxaparin Injectable 40 milliGRAM(s) SubCutaneous daily  insulin lispro (HumaLOG) corrective regimen sliding scale   SubCutaneous three times a day before meals  insulin lispro (HumaLOG) corrective regimen sliding scale   SubCutaneous at bedtime  lisinopril 2.5 milliGRAM(s) Oral daily  metoprolol tartrate 50 milliGRAM(s) Oral two times a day  predniSONE   Tablet 20 milliGRAM(s) Oral daily    MEDICATIONS  (PRN):  acetaminophen   Tablet .. 650 milliGRAM(s) Oral every 6 hours PRN Mild Pain (1 - 3)  dextrose 40% Gel 15 Gram(s) Oral once PRN Blood Glucose LESS THAN 70 milliGRAM(s)/deciliter  glucagon  Injectable 1 milliGRAM(s) IntraMuscular once PRN Glucose LESS THAN 70 milligrams/deciliter  oxyCODONE    5 mG/acetaminophen 325 mG 1 Tablet(s) Oral every 6 hours PRN Moderate Pain (4 - 6)  senna 2 Tablet(s) Oral at bedtime PRN Constipation    Vital Signs Last 24 Hrs  T(F): 98.2 (15 Oct 2019 05:12), Max: 98.5 (14 Oct 2019 12:10)  HR: 54 (15 Oct 2019 05:12) (50 - 60)  BP: 117/55 (15 Oct 2019 05:12) (96/41 - 143/60)  RR: 16 (15 Oct 2019 05:12) (15 - 18)  SpO2: 96% (15 Oct 2019 05:12) (96% - 100%)  I&O's Summary      PHYSICAL EXAM:  General: NAD, well dressed, well nourished  Head: NT/AC  ENT: MMM, no oropharyngeal erythema or exudates  Neck: Supple, No JVD  Lungs: Clear to auscultation bilaterally, no wheezing, rales, or rhonchi, no accessory muscle use  Cardio: RRR, normal S1/S2, No M/R/G  Abdomen: Normoactive BS, Abdomen soft, nontender, nondistended; no organomegaly  Extremities: No calf tenderness, no clubbing or  cyanosis. Swelling of right knee with mild warmth, posterior knee also swollen and tender to touch. Limited ROM due to pain. No palpable cord. No erythema or open wounds.   Vascular: no LE edema  Lymph: no cervical LAD  Skin: warm and dry  Neuro: AAOx3, answers all questions appropriately, no focal deficits    LABS:                        11.6   6.06  )-----------( 196      ( 15 Oct 2019 06:40 )             34.9     10-15    141  |  106  |  14  ----------------------------<  122  3.9   |  25  |  1.25    Ca    9.2      15 Oct 2019 06:40  Phos  3.9     10-15    TPro  8.2  /  Alb  3.0  /  TBili  0.6  /  DBili  x   /  AST  19  /  ALT  8   /  AlkPhos  158  10-14    eGFR if Non African American: 40 mL/min/1.73M2 (10-15-19 @ 06:40)  eGFR if : 47 mL/min/1.73M2 (10-15-19 @ 06:40)                          POCT Blood Glucose.: 106 mg/dL (15 Oct 2019 07:48)  POCT Blood Glucose.: 122 mg/dL (14 Oct 2019 21:55)  POCT Blood Glucose.: 125 mg/dL (14 Oct 2019 17:57)    10-15 [id number] 6.1          RADIOLOGY & ADDITIONAL TESTS:    < from: CT Knee No Cont, Right (10.15.19 @ 09:41) >    EXAM:  CT KNEE ONLY RT      PROCEDURE DATE:  10/15/2019        INTERPRETATION:  EXAMINATION: CT of the right knee without contrast    CLINICAL INFORMATION: Right knee pain and swelling    TECHNIQUE: Axial CT images were obtained through the right knee. Coronal   and sagittal reformatted images were made. 3-D reconstruction images were   performed on an independent workstation.    FINDINGS: No acute fracture or dislocation is demonstrated. There is a   moderate to large nonspecific knee joint effusion.     There is nonspecific rounded lucent lesion in the central aspect the   lateral femoral condyle spanning 2.0 x 1.7 x 1.5 cm. This is not in a   subchondral location.    There is tricompartmental osteoarthritis, most advanced in the medial and   patellofemoral compartments.    There are atherosclerotic vascular calcifications.    IMPRESSION: Nonspecific rounded lucent lesion in the lateral femoral   condyle, as above. Benign and malignant processes are in the differential   diagnosis. MRI with contrast is recommended to further characterize.  No acute fracture or dislocation.  Moderate to large nonspecific knee joint effusion.    < end of copied text >      Care Discussed with Consultants/Other Providers:

## 2019-10-15 NOTE — PHYSICAL THERAPY INITIAL EVALUATION ADULT - ADDITIONAL COMMENTS
Pt lives alone in an apartment, no steps.  Prior to admission pt was independent with ambulation and ADLs, used RW.

## 2019-10-15 NOTE — PROGRESS NOTE ADULT - ASSESSMENT
82 yo female with HTN, CAD, DM, RA, Obesity presents from home for unable to ambulate.     Right knee pain   Gout versus worsening osteoarthritis  - check uric acid  - start empiric Po prednisone for gout flare  - if does not improve, may need ct scan of knee   - PT consult   - pain control    Obesity  BMI (kg/m2): 37.1 (10-13-19 @ 11:27), 28.3 (10-12-19 @ 17:55)    Hypertension  - cont home meds    Rheumatoid arthritis  - not on chronic meds     IMPROVE VTE Individual Risk Assessment          RISK                                                          Points  [  ] Previous VTE                                                3  [  ] Thrombophilia                                             2  [  ] Lower limb paralysis                                   2        (unable to hold up >15 seconds)    [  ] Current Cancer                                             2         (within 6 months)  [  ] Immobilization > 24 hrs                              1  [  ] ICU/CCU stay > 24 hours                             1  [X  ] Age > 60                                                         1    IMPROVE VTE Score:         [   1      ]    Total Risk Factor Score:    0 - 1:   Consider IPC  >2 - 3:  Thromboprophylaxis required (enoxaparin or SQ heparin)        >4:   High Risk: Thromboprophylaxis required (enoxaparin or SQ heparin), optional add IPC  **If CONTRAINDICATION to enoxaparin or SQ heparin, USE IPCs** 84 yo female with HTN, CAD, DM, RA, Obesity presents from home for unable to ambulate.     # Right knee pain: osteoarthritis vs. RA flare vs. gout. CT R knee shows tricompartmental osteoarthritis, most advanced in the medial and patellofemoral compartments Moderate to large nonspecific knee joint effusion. Uric acid elevated. Low suspicion for infectious/ septic arthritis given no fevers, normal WBC, joint is not red or warm to touch  - continue empiric Prednisone and colchicine for acute gout flare  - Rheumatology consulted for possible arthrocentesis  - start empiric Po prednisone for gout flare  - PT consult   - pain control    #CAD:    #Obesity: BMI (kg/m2): 37.1 (10-13-19 @ 11:27), 28.3 (10-12-19 @ 17:55)  - outpatient exercise/ weight loss program    #HTN:  - cont home meds    #Rheumatoid arthritis: poor follow up, not on meds at home. Likely RA nodule seen on CXR  - Rheumatology consulted, recs appreciated    #DVT Ppx:  - lovenox 84 yo female with HTN, HLD, CAD with remote MI and stents, DM, RA, CKD stage 3, Obesity presents from home for worsening right knee pain, swelling and inability to ambulate.     # Right knee pain: osteoarthritis vs. RA flare vs. gout. CT R knee shows tricompartmental osteoarthritis, most advanced in the medial and patellofemoral compartments Moderate to large nonspecific knee joint effusion. Uric acid elevated. Low suspicion for infectious/ septic arthritis given no fevers, normal WBC, joint is not red or warm to touch  - continue empiric Prednisone and colchicine for acute gout flare  - Rheumatology consulted for possible arthrocentesis  - continue PO prednisone for possible gout flare  - PT consult   - pain control    #CAD: with remote history of MI, cardiac stents. Stable, currently without chest pain  - continue aspirin, plavix, statin    #HTN:  - continue metoprolol (dose reduced from 50mg BID to 25mg BID due to bradycardia)  - continue lisinopril 2.5mg daily    #CKD stage 3: Cr 1.2-1.3 baseline  - stable  - continue lisinopril    #Rheumatoid arthritis: poor follow up, not on meds at home. Likely RA nodule seen on CXR  - Rheumatology consulted, recs appreciated    #HLD:  - continue statin    #Obesity: BMI (kg/m2): 37.1 (10-13-19 @ 11:27), 28.3 (10-12-19 @ 17:55)  - outpatient exercise/ weight loss program    #DVT Ppx:  - lovenox    #GI ppx:  - Pepcid while on steroids    CAPRINI SCORE [CLOT]    AGE RELATED RISK FACTORS                                                       MOBILITY RELATED FACTORS  [ ] Age 41-60 years                                            (1 Point)                  [ ] Bed rest                                                        (1 Point)  [ ] Age: 61-74 years                                           (2 Points)                 [ ] Plaster cast                                                   (2 Points)  [x ] Age= 75 years                                              (3 Points)                 [x ] Bed bound for more than 72 hours                 (2 Points)    DISEASE RELATED RISK FACTORS                                               GENDER SPECIFIC FACTORS  [ ] Edema in the lower extremities                       (1 Point)                  [ ] Pregnancy                                                     (1 Point)  [ ] Varicose veins                                               (1 Point)                  [ ] Post-partum < 6 weeks                                   (1 Point)             [x ] BMI > 25 Kg/m2                                            (1 Point)                  [ ] Hormonal therapy  or oral contraception          (1 Point)                 [ ] Sepsis (in the previous month)                        (1 Point)                  [ ] History of pregnancy complications                 (1 point)  [ ] Pneumonia or serious lung disease                                               [ ] Unexplained or recurrent                     (1 Point)           (in the previous month)                               (1 Point)  [ ] Abnormal pulmonary function test                     (1 Point)                 SURGERY RELATED RISK FACTORS  [ ] Acute myocardial infarction                              (1 Point)                 [ ]  Section                                             (1 Point)  [ ] Congestive heart failure (in the previous month)  (1 Point)               [ ] Minor surgery                                                  (1 Point)   [ ] Inflammatory bowel disease                             (1 Point)                 [ ] Arthroscopic surgery                                        (2 Points)  [ ] Central venous access                                      (2 Points)                [ ] General surgery lasting more than 45 minutes   (2 Points)       [ ] Stroke (in the previous month)                          (5 Points)               [ ] Elective arthroplasty                                         (5 Points)                                                                                                                                               HEMATOLOGY RELATED FACTORS                                                 TRAUMA RELATED RISK FACTORS  [ ] Prior episodes of VTE                                     (3 Points)                [ ] Fracture of the hip, pelvis, or leg                       (5 Points)  [ ] Positive family history for VTE                         (3 Points)                 [ ] Acute spinal cord injury (in the previous month)  (5 Points)  [ ] Prothrombin 85304 A                                     (3 Points)                 [ ] Paralysis  (less than 1 month)                             (5 Points)  [ ] Factor V Leiden                                             (3 Points)                  [ ] Multiple Trauma within 1 month                        (5 Points)  [ ] Lupus anticoagulants                                     (3 Points)                                                           [ ] Anticardiolipin antibodies                               (3 Points)                                                       [ ] High homocysteine in the blood                      (3 Points)                                             [ ] Other congenital or acquired thrombophilia      (3 Points)                                                [ ] Heparin induced thrombocytopenia                  (3 Points)                                          Total Score [   6       ]    Caprini Score 0 - 2:  Low Risk, No VTE Prophylaxis required for most patients, encourage ambulation  Caprini Score 3 - 6:  At Risk, pharmacologic VTE prophylaxis is indicated for most patients (in the absence of a contraindication)  Caprini Score Greater than or = 7:  High Risk, pharmacologic VTE prophylaxis is indicated for most patients (in the absence of a contraindication)

## 2019-10-16 LAB
ANION GAP SERPL CALC-SCNC: 10 MMOL/L — SIGNIFICANT CHANGE UP (ref 5–17)
BASOPHILS # BLD AUTO: 0.02 K/UL — SIGNIFICANT CHANGE UP (ref 0–0.2)
BASOPHILS NFR BLD AUTO: 0.3 % — SIGNIFICANT CHANGE UP (ref 0–2)
BUN SERPL-MCNC: 20 MG/DL — SIGNIFICANT CHANGE UP (ref 7–23)
CALCIUM SERPL-MCNC: 9.6 MG/DL — SIGNIFICANT CHANGE UP (ref 8.4–10.5)
CHLORIDE SERPL-SCNC: 107 MMOL/L — SIGNIFICANT CHANGE UP (ref 96–108)
CO2 SERPL-SCNC: 24 MMOL/L — SIGNIFICANT CHANGE UP (ref 22–31)
CREAT SERPL-MCNC: 1.28 MG/DL — SIGNIFICANT CHANGE UP (ref 0.5–1.3)
EOSINOPHIL # BLD AUTO: 0.09 K/UL — SIGNIFICANT CHANGE UP (ref 0–0.5)
EOSINOPHIL NFR BLD AUTO: 1.2 % — SIGNIFICANT CHANGE UP (ref 0–6)
GLUCOSE SERPL-MCNC: 113 MG/DL — HIGH (ref 70–99)
HCT VFR BLD CALC: 35.1 % — SIGNIFICANT CHANGE UP (ref 34.5–45)
HGB BLD-MCNC: 11.9 G/DL — SIGNIFICANT CHANGE UP (ref 11.5–15.5)
IMM GRANULOCYTES NFR BLD AUTO: 0.3 % — SIGNIFICANT CHANGE UP (ref 0–1.5)
LYMPHOCYTES # BLD AUTO: 3.07 K/UL — SIGNIFICANT CHANGE UP (ref 1–3.3)
LYMPHOCYTES # BLD AUTO: 41.9 % — SIGNIFICANT CHANGE UP (ref 13–44)
MAGNESIUM SERPL-MCNC: 2.2 MG/DL — SIGNIFICANT CHANGE UP (ref 1.6–2.6)
MCHC RBC-ENTMCNC: 29.8 PG — SIGNIFICANT CHANGE UP (ref 27–34)
MCHC RBC-ENTMCNC: 33.9 GM/DL — SIGNIFICANT CHANGE UP (ref 32–36)
MCV RBC AUTO: 87.8 FL — SIGNIFICANT CHANGE UP (ref 80–100)
MONOCYTES # BLD AUTO: 0.53 K/UL — SIGNIFICANT CHANGE UP (ref 0–0.9)
MONOCYTES NFR BLD AUTO: 7.2 % — SIGNIFICANT CHANGE UP (ref 2–14)
NEUTROPHILS # BLD AUTO: 3.59 K/UL — SIGNIFICANT CHANGE UP (ref 1.8–7.4)
NEUTROPHILS NFR BLD AUTO: 49.1 % — SIGNIFICANT CHANGE UP (ref 43–77)
NRBC # BLD: 0 /100 WBCS — SIGNIFICANT CHANGE UP (ref 0–0)
PLATELET # BLD AUTO: 199 K/UL — SIGNIFICANT CHANGE UP (ref 150–400)
POTASSIUM SERPL-MCNC: 4 MMOL/L — SIGNIFICANT CHANGE UP (ref 3.5–5.3)
POTASSIUM SERPL-SCNC: 4 MMOL/L — SIGNIFICANT CHANGE UP (ref 3.5–5.3)
RBC # BLD: 4 M/UL — SIGNIFICANT CHANGE UP (ref 3.8–5.2)
RBC # FLD: 13.4 % — SIGNIFICANT CHANGE UP (ref 10.3–14.5)
SODIUM SERPL-SCNC: 141 MMOL/L — SIGNIFICANT CHANGE UP (ref 135–145)
WBC # BLD: 7.32 K/UL — SIGNIFICANT CHANGE UP (ref 3.8–10.5)
WBC # FLD AUTO: 7.32 K/UL — SIGNIFICANT CHANGE UP (ref 3.8–10.5)

## 2019-10-16 PROCEDURE — 99232 SBSQ HOSP IP/OBS MODERATE 35: CPT

## 2019-10-16 RX ORDER — LIDOCAINE 4 G/100G
1 CREAM TOPICAL DAILY
Refills: 0 | Status: DISCONTINUED | OUTPATIENT
Start: 2019-10-16 | End: 2019-10-19

## 2019-10-16 RX ADMIN — LIDOCAINE 1 PATCH: 4 CREAM TOPICAL at 20:13

## 2019-10-16 RX ADMIN — CLOPIDOGREL BISULFATE 75 MILLIGRAM(S): 75 TABLET, FILM COATED ORAL at 13:47

## 2019-10-16 RX ADMIN — Medication 100 MILLIGRAM(S): at 05:43

## 2019-10-16 RX ADMIN — Medication 20 MILLIGRAM(S): at 05:43

## 2019-10-16 RX ADMIN — FAMOTIDINE 20 MILLIGRAM(S): 10 INJECTION INTRAVENOUS at 13:47

## 2019-10-16 RX ADMIN — Medication 100 MILLIGRAM(S): at 21:28

## 2019-10-16 RX ADMIN — LIDOCAINE 1 PATCH: 4 CREAM TOPICAL at 18:16

## 2019-10-16 RX ADMIN — Medication 100 MILLIGRAM(S): at 13:45

## 2019-10-16 RX ADMIN — OXYCODONE AND ACETAMINOPHEN 1 TABLET(S): 5; 325 TABLET ORAL at 14:40

## 2019-10-16 RX ADMIN — Medication 0.6 MILLIGRAM(S): at 13:47

## 2019-10-16 RX ADMIN — LISINOPRIL 2.5 MILLIGRAM(S): 2.5 TABLET ORAL at 05:43

## 2019-10-16 RX ADMIN — OXYCODONE AND ACETAMINOPHEN 1 TABLET(S): 5; 325 TABLET ORAL at 13:44

## 2019-10-16 RX ADMIN — ATORVASTATIN CALCIUM 40 MILLIGRAM(S): 80 TABLET, FILM COATED ORAL at 21:28

## 2019-10-16 RX ADMIN — ENOXAPARIN SODIUM 40 MILLIGRAM(S): 100 INJECTION SUBCUTANEOUS at 13:46

## 2019-10-16 RX ADMIN — Medication 81 MILLIGRAM(S): at 13:47

## 2019-10-16 NOTE — PROGRESS NOTE ADULT - ASSESSMENT
82 yo female with HTN, HLD, CAD with remote MI and stents, DM, RA, CKD stage 3, Obesity presents from home for worsening right knee pain, swelling and inability to ambulate.     # Right knee pain: osteoarthritis vs. RA flare vs. gout. CT R knee shows tricompartmental osteoarthritis, most advanced in the medial and patellofemoral compartments Moderate to large nonspecific knee joint effusion. Uric acid elevated. Low suspicion for infectious/ septic arthritis given no fevers, normal WBC, joint is not red or warm to touch  - continue empiric Prednisone and colchicine for acute gout flare  - Rheumatology consulted for possible arthrocentesis, recs appreciated  - continue PO prednisone for possible gout flare  - pain control  - PT recommends [facility] but pt refuses rehab, wants to go home, amenable to home PT    #CAD: with remote history of MI, cardiac stents. Stable, currently without chest pain  - continue aspirin, plavix, statin    #HTN:  - continue metoprolol (dose reduced from 50mg BID to 25mg BID due to bradycardia)  - continue lisinopril 2.5mg daily    #CKD stage 3: Cr 1.2-1.3 baseline  - stable  - continue lisinopril    #Rheumatoid arthritis: poor follow up, not on meds at home. Likely RA nodule seen on CXR  - Rheumatology consulted, recs appreciated    #HLD:  - continue statin    #DM type II, no on long term insulin use: Hba1C 6.1  - discontinue ISS    #Obesity: BMI (kg/m2): 37.1 (10-13-19 @ 11:27), 28.3 (10-12-19 @ 17:55)  - outpatient exercise/ weight loss program    #DVT Ppx:  - lovenox    #GI ppx:  - Pepcid while on steroids    CAPRINI SCORE [CLOT]    AGE RELATED RISK FACTORS                                                       MOBILITY RELATED FACTORS  [ ] Age 41-60 years                                            (1 Point)                  [ ] Bed rest                                                        (1 Point)  [ ] Age: 61-74 years                                           (2 Points)                 [ ] Plaster cast                                                   (2 Points)  [x ] Age= 75 years                                              (3 Points)                 [x ] Bed bound for more than 72 hours                 (2 Points)    DISEASE RELATED RISK FACTORS                                               GENDER SPECIFIC FACTORS  [ ] Edema in the lower extremities                       (1 Point)                  [ ] Pregnancy                                                     (1 Point)  [ ] Varicose veins                                               (1 Point)                  [ ] Post-partum < 6 weeks                                   (1 Point)             [x ] BMI > 25 Kg/m2                                            (1 Point)                  [ ] Hormonal therapy  or oral contraception          (1 Point)                 [ ] Sepsis (in the previous month)                        (1 Point)                  [ ] History of pregnancy complications                 (1 point)  [ ] Pneumonia or serious lung disease                                               [ ] Unexplained or recurrent                     (1 Point)           (in the previous month)                               (1 Point)  [ ] Abnormal pulmonary function test                     (1 Point)                 SURGERY RELATED RISK FACTORS  [ ] Acute myocardial infarction                              (1 Point)                 [ ]  Section                                             (1 Point)  [ ] Congestive heart failure (in the previous month)  (1 Point)               [ ] Minor surgery                                                  (1 Point)   [ ] Inflammatory bowel disease                             (1 Point)                 [ ] Arthroscopic surgery                                        (2 Points)  [ ] Central venous access                                      (2 Points)                [ ] General surgery lasting more than 45 minutes   (2 Points)       [ ] Stroke (in the previous month)                          (5 Points)               [ ] Elective arthroplasty                                         (5 Points)                                                                                                                                               HEMATOLOGY RELATED FACTORS                                                 TRAUMA RELATED RISK FACTORS  [ ] Prior episodes of VTE                                     (3 Points)                [ ] Fracture of the hip, pelvis, or leg                       (5 Points)  [ ] Positive family history for VTE                         (3 Points)                 [ ] Acute spinal cord injury (in the previous month)  (5 Points)  [ ] Prothrombin 64349 A                                     (3 Points)                 [ ] Paralysis  (less than 1 month)                             (5 Points)  [ ] Factor V Leiden                                             (3 Points)                  [ ] Multiple Trauma within 1 month                        (5 Points)  [ ] Lupus anticoagulants                                     (3 Points)                                                           [ ] Anticardiolipin antibodies                               (3 Points)                                                       [ ] High homocysteine in the blood                      (3 Points)                                             [ ] Other congenital or acquired thrombophilia      (3 Points)                                                [ ] Heparin induced thrombocytopenia                  (3 Points)                                          Total Score [   6       ]    Caprini Score 0 - 2:  Low Risk, No VTE Prophylaxis required for most patients, encourage ambulation  Caprini Score 3 - 6:  At Risk, pharmacologic VTE prophylaxis is indicated for most patients (in the absence of a contraindication)  Caprini Score Greater than or = 7:  High Risk, pharmacologic VTE prophylaxis is indicated for most patients (in the absence of a contraindication)

## 2019-10-16 NOTE — PROGRESS NOTE ADULT - SUBJECTIVE AND OBJECTIVE BOX
Patient is a 83y old  Female who presents with a chief complaint of Knee pain/unable to ambulate (15 Oct 2019 11:57)      Interval HPI/ Overnight events: No events overnight    Patient seen and examined at bedside, right knee pain persistent    REVIEW OF SYSTEMS:    CONSTITUTIONAL: No weakness, fevers or chills  EYES/ENT: No visual changes;  No vertigo or throat pain   NECK: No pain or stiffness  RESPIRATORY: No cough, wheezing, hemoptysis; No shortness of breath  CARDIOVASCULAR: No chest pain or palpitations  GASTROINTESTINAL: No abdominal or epigastric pain. No nausea, vomiting, or hematemesis; No diarrhea or constipation. No melena or hematochezia.  GENITOURINARY: No dysuria, frequency or hematuria  NEUROLOGICAL: No numbness or weakness  SKIN: No itching, burning, rashes, or lesions   MSK: Right knee pain and swelling  All other review of systems is negative unless indicated above.      ALLERGIES:  sulfa drugs (Rash)    MEDICATIONS  (STANDING):  aspirin enteric coated 81 milliGRAM(s) Oral daily  atorvastatin 40 milliGRAM(s) Oral at bedtime  clopidogrel Tablet 75 milliGRAM(s) Oral daily  colchicine 0.6 milliGRAM(s) Oral daily  dextrose 5%. 1000 milliLiter(s) (50 mL/Hr) IV Continuous <Continuous>  dextrose 50% Injectable 12.5 Gram(s) IV Push once  dextrose 50% Injectable 25 Gram(s) IV Push once  dextrose 50% Injectable 25 Gram(s) IV Push once  docusate sodium 100 milliGRAM(s) Oral three times a day  enoxaparin Injectable 40 milliGRAM(s) SubCutaneous daily  famotidine    Tablet 20 milliGRAM(s) Oral daily  lisinopril 2.5 milliGRAM(s) Oral daily  metoprolol tartrate 25 milliGRAM(s) Oral two times a day  predniSONE   Tablet 20 milliGRAM(s) Oral daily    MEDICATIONS  (PRN):  acetaminophen   Tablet .. 650 milliGRAM(s) Oral every 6 hours PRN Mild Pain (1 - 3)  dextrose 40% Gel 15 Gram(s) Oral once PRN Blood Glucose LESS THAN 70 milliGRAM(s)/deciliter  glucagon  Injectable 1 milliGRAM(s) IntraMuscular once PRN Glucose LESS THAN 70 milligrams/deciliter  oxyCODONE    5 mG/acetaminophen 325 mG 1 Tablet(s) Oral every 6 hours PRN Moderate Pain (4 - 6)  senna 2 Tablet(s) Oral at bedtime PRN Constipation    Vital Signs Last 24 Hrs  T(F): 97.5 (16 Oct 2019 04:55), Max: 98.5 (15 Oct 2019 21:17)  HR: 49 (16 Oct 2019 04:55) (49 - 63)  BP: 135/60 (16 Oct 2019 04:55) (121/64 - 135/60)  RR: 15 (16 Oct 2019 04:55) (15 - 15)  SpO2: 98% (16 Oct 2019 04:55) (98% - 100%)  I&O's Summary    15 Oct 2019 07:01  -  16 Oct 2019 07:00  --------------------------------------------------------  IN: 120 mL / OUT: 400 mL / NET: -280 mL    16 Oct 2019 07:01  -  16 Oct 2019 11:27  --------------------------------------------------------  IN: 600 mL / OUT: 500 mL / NET: 100 mL        PHYSICAL EXAM:  General: NAD, well dressed, well nourished  Head: NT/AC  ENT: MMM, no oropharyngeal erythema or exudates  Neck: Supple, No JVD  Lungs: Clear to auscultation bilaterally, no wheezing, rales, or rhonchi, no accessory muscle use  Cardio: RRR, normal S1/S2, No M/R/G  Abdomen: Normoactive BS, Abdomen soft, nontender, nondistended; no organomegaly  Extremities: No calf tenderness, no clubbing or  cyanosis. Swelling of right knee with mild warmth, posterior knee also swollen and tender to touch. Limited ROM due to pain. No palpable cord. No erythema or open wounds.   Vascular: no LE edema  Skin: warm and dry  Neuro: AAOx3, answers all questions appropriately, no focal deficits    LABS:                        11.9   7.32  )-----------( 199      ( 16 Oct 2019 07:16 )             35.1     10-16    141  |  107  |  20  ----------------------------<  113  4.0   |  24  |  1.28    Ca    9.6      16 Oct 2019 07:16  Phos  3.9     10-15  Mg     2.2     10-16    TPro  8.2  /  Alb  3.0  /  TBili  0.6  /  DBili  x   /  AST  19  /  ALT  8   /  AlkPhos  158  10-14    eGFR if Non African American: 39 mL/min/1.73M2 (10-16-19 @ 07:16)  eGFR if African American: 45 mL/min/1.73M2 (10-16-19 @ 07:16)                          POCT Blood Glucose.: 172 mg/dL (15 Oct 2019 16:51)  POCT Blood Glucose.: 154 mg/dL (15 Oct 2019 11:57)    10-15 [id number] 6.1          RADIOLOGY & ADDITIONAL TESTS:    Care Discussed with Consultants/Other Providers:

## 2019-10-17 LAB
CRP SERPL-MCNC: 1.45 MG/DL — HIGH (ref 0–0.4)
ERYTHROCYTE [SEDIMENTATION RATE] IN BLOOD: 120 MM/HR — HIGH (ref 0–20)
RHEUMATOID FACT SERPL-ACNC: <10 IU/ML — SIGNIFICANT CHANGE UP (ref 0–13)

## 2019-10-17 PROCEDURE — 99223 1ST HOSP IP/OBS HIGH 75: CPT

## 2019-10-17 PROCEDURE — 99232 SBSQ HOSP IP/OBS MODERATE 35: CPT

## 2019-10-17 RX ORDER — LISINOPRIL 2.5 MG/1
5 TABLET ORAL DAILY
Refills: 0 | Status: DISCONTINUED | OUTPATIENT
Start: 2019-10-18 | End: 2019-10-18

## 2019-10-17 RX ORDER — METOPROLOL TARTRATE 50 MG
12.5 TABLET ORAL
Refills: 0 | Status: DISCONTINUED | OUTPATIENT
Start: 2019-10-17 | End: 2019-10-25

## 2019-10-17 RX ADMIN — Medication 100 MILLIGRAM(S): at 05:45

## 2019-10-17 RX ADMIN — LISINOPRIL 2.5 MILLIGRAM(S): 2.5 TABLET ORAL at 05:45

## 2019-10-17 RX ADMIN — LIDOCAINE 1 PATCH: 4 CREAM TOPICAL at 05:34

## 2019-10-17 NOTE — PROGRESS NOTE ADULT - ATTENDING COMMENTS
I have personally seen and examined patient on the above date.  I discussed the case with BONNIE Hankins and I agree with findings and plan as detailed per note above, which I have amended where appropriate.

## 2019-10-17 NOTE — PROGRESS NOTE ADULT - SUBJECTIVE AND OBJECTIVE BOX
Patient is a 83y old  Female who presents with a chief complaint of Knee pain/unable to ambulate (16 Oct 2019 11:26)  Patient seen and examined at bedside.  Pt. still with knee pain.    ALLERGIES:  sulfa drugs (Rash)    MEDICATIONS  (STANDING):  aspirin enteric coated 81 milliGRAM(s) Oral daily  atorvastatin 40 milliGRAM(s) Oral at bedtime  clopidogrel Tablet 75 milliGRAM(s) Oral daily  colchicine 0.6 milliGRAM(s) Oral daily  dextrose 5%. 1000 milliLiter(s) (50 mL/Hr) IV Continuous <Continuous>  dextrose 50% Injectable 12.5 Gram(s) IV Push once  dextrose 50% Injectable 25 Gram(s) IV Push once  dextrose 50% Injectable 25 Gram(s) IV Push once  docusate sodium 100 milliGRAM(s) Oral three times a day  enoxaparin Injectable 40 milliGRAM(s) SubCutaneous daily  famotidine    Tablet 20 milliGRAM(s) Oral daily  lidocaine   Patch 1 Patch Transdermal daily  lisinopril 5 milliGRAM(s) Oral daily  metoprolol tartrate 25 milliGRAM(s) Oral two times a day    MEDICATIONS  (PRN):  acetaminophen   Tablet .. 650 milliGRAM(s) Oral every 6 hours PRN Mild Pain (1 - 3)  dextrose 40% Gel 15 Gram(s) Oral once PRN Blood Glucose LESS THAN 70 milliGRAM(s)/deciliter  glucagon  Injectable 1 milliGRAM(s) IntraMuscular once PRN Glucose LESS THAN 70 milligrams/deciliter  oxyCODONE    5 mG/acetaminophen 325 mG 1 Tablet(s) Oral every 6 hours PRN Moderate Pain (4 - 6)  senna 2 Tablet(s) Oral at bedtime PRN Constipation    Vital Signs Last 24 Hrs  T(F): 98 (17 Oct 2019 08:40), Max: 98 (17 Oct 2019 08:40)  HR: 47 (17 Oct 2019 08:40) (45 - 54)  BP: 143/54 (17 Oct 2019 08:40) (118/65 - 143/54)  RR: 17 (17 Oct 2019 08:40) (15 - 17)  SpO2: 100% (17 Oct 2019 08:40) (98% - 100%)  I&O's Summary    16 Oct 2019 07:01  -  17 Oct 2019 07:00  --------------------------------------------------------  IN: 1540 mL / OUT: 1250 mL / NET: 290 mL    17 Oct 2019 07:01  -  17 Oct 2019 10:47  --------------------------------------------------------  IN: 700 mL / OUT: 1300 mL / NET: -600 mL      PHYSICAL EXAM:  General: NAD, A/O x 3  ENT: MMM  Neck: Supple, No JVD  Lungs: good air entry, Clear to auscultation bilaterally  Cardio: RRR, S1/S2, No murmurs  Abdomen: Soft, obese, Nontender, Nondistended; Bowel sounds present  Extremities: No calf tenderness, right knee with mild edema, no erythema, tender at posterior aspect with decreased ROM  Neuro:  speech fluent, no focal deficits    LABS:                        11.9   7.32  )-----------( 199      ( 16 Oct 2019 07:16 )             35.1     10-16    141  |  107  |  20  ----------------------------<  113  4.0   |  24  |  1.28    Ca    9.6      16 Oct 2019 07:16  Phos  3.9     10-15  Mg     2.2     10-16      eGFR if Non African American: 39 mL/min/1.73M2 (10-16-19 @ 07:16)  eGFR if African American: 45 mL/min/1.73M2 (10-16-19 @ 07:16)          10-15 [id number] 6.1          RADIOLOGY & ADDITIONAL TESTS:    Care Discussed with Consultants/Other Providers: Patient is a 83y old  Female who presents with a chief complaint of Knee pain/unable to ambulate (16 Oct 2019 11:26)  Patient seen and examined at bedside.  Pt. still with knee pain.    ALLERGIES:  sulfa drugs (Rash)    MEDICATIONS  (STANDING):  aspirin enteric coated 81 milliGRAM(s) Oral daily  atorvastatin 40 milliGRAM(s) Oral at bedtime  clopidogrel Tablet 75 milliGRAM(s) Oral daily  colchicine 0.6 milliGRAM(s) Oral daily  dextrose 5%. 1000 milliLiter(s) (50 mL/Hr) IV Continuous <Continuous>  dextrose 50% Injectable 12.5 Gram(s) IV Push once  dextrose 50% Injectable 25 Gram(s) IV Push once  dextrose 50% Injectable 25 Gram(s) IV Push once  docusate sodium 100 milliGRAM(s) Oral three times a day  enoxaparin Injectable 40 milliGRAM(s) SubCutaneous daily  famotidine    Tablet 20 milliGRAM(s) Oral daily  lidocaine   Patch 1 Patch Transdermal daily  lisinopril 5 milliGRAM(s) Oral daily  metoprolol tartrate 25 milliGRAM(s) Oral two times a day    MEDICATIONS  (PRN):  acetaminophen   Tablet .. 650 milliGRAM(s) Oral every 6 hours PRN Mild Pain (1 - 3)  dextrose 40% Gel 15 Gram(s) Oral once PRN Blood Glucose LESS THAN 70 milliGRAM(s)/deciliter  glucagon  Injectable 1 milliGRAM(s) IntraMuscular once PRN Glucose LESS THAN 70 milligrams/deciliter  oxyCODONE    5 mG/acetaminophen 325 mG 1 Tablet(s) Oral every 6 hours PRN Moderate Pain (4 - 6)  senna 2 Tablet(s) Oral at bedtime PRN Constipation    Vital Signs Last 24 Hrs  T(F): 98 (17 Oct 2019 08:40), Max: 98 (17 Oct 2019 08:40)  HR: 47 (17 Oct 2019 08:40) (45 - 54)  BP: 143/54 (17 Oct 2019 08:40) (118/65 - 143/54)  RR: 17 (17 Oct 2019 08:40) (15 - 17)  SpO2: 100% (17 Oct 2019 08:40) (98% - 100%)  I&O's Summary    16 Oct 2019 07:01  -  17 Oct 2019 07:00  --------------------------------------------------------  IN: 1540 mL / OUT: 1250 mL / NET: 290 mL    17 Oct 2019 07:01  -  17 Oct 2019 10:47  --------------------------------------------------------  IN: 700 mL / OUT: 1300 mL / NET: -600 mL      PHYSICAL EXAM:  General: NAD, Alert  ENT: MMM  Neck: Supple, No JVD  Lungs: good air entry, Clear to auscultation bilaterally  Cardio: +s1/s2  Abdomen: Soft, obese, Nontender, Nondistended; Bowel sounds present  Extremities: No calf tenderness, right knee with mild edema, no erythema, tender at posterior aspect with decreased ROM    LABS:                        11.9   7.32  )-----------( 199      ( 16 Oct 2019 07:16 )             35.1     10-16    141  |  107  |  20  ----------------------------<  113  4.0   |  24  |  1.28    Ca    9.6      16 Oct 2019 07:16  Phos  3.9     10-15  Mg     2.2     10-16    eGFR if Non African American: 39 mL/min/1.73M2 (10-16-19 @ 07:16)  eGFR if African American: 45 mL/min/1.73M2 (10-16-19 @ 07:16)    10-15 [id number] 6.1

## 2019-10-17 NOTE — DIETITIAN INITIAL EVALUATION ADULT. - OTHER INFO
Pt. tolerates diet well; CONS, CHO RENAL. Admits to noncompliance at home. Has had education in past but states  she has a lot of limitations. Received meals on wheels and has a lot of trouble walking. Denies any N/V/ diarrhea. Denies any weight changes. Skin intact. Edema right knee. Last BM was 10/16.

## 2019-10-17 NOTE — PROGRESS NOTE ADULT - ASSESSMENT
82 yo female with HTN, HLD, CAD (remote MI and stents), DM, RA, CKD stage 3, Obesity presents from home for worsening right knee pain, swelling and inability to ambulate.     # Right knee pain: (osteoarthritis vs. RA flare vs. gout); CT R knee shows tricompartmental osteoarthritis, most advanced in the medial and patellofemoral compartments. Moderate to large nonspecific knee joint effusion. Uric acid elevated. Low suspicion for infectious/ septic arthritis given no fevers, normal wbc:  - continue colchicine for acute gout flare, pain mgmt with lidocaine patch, tylenol prn  - Rheumatology consulted for possible arthrocentesis  - PT recommends [facility] but pt refuses rehab, wants to go home, amenable to home PT    #CAD: with remote history of MI, cardiac stents. Stable, currently without chest pain  - continue aspirin, plavix, statin    #HTN with bradycardia:  - continue metoprolol (dose reduced from 25 to 12.5 mg bid due to bradycardia)  - change lisinopril to 5 mg. daily    #CKD stage 3: Cr 1.2-1.3 baseline  - stable  - continue lisinopril    #Rheumatoid arthritis: poor follow up, not on meds at home. Likely RA nodule seen on CXR  - Rheumatology consulted; pt. seen awaiting official consult for further recs    #HLD:  - continue statin    #DM type II, no on long term insulin use: Hba1C 6.1  - discontinue ISS    #Obesity: BMI (kg/m2): 37.1 (10-13-19 @ 11:27), 28.3 (10-12-19 @ 17:55)  - outpatient exercise/ weight loss program    #DVT Ppx:  - lovenox    CAPRINI SCORE [CLOT]    AGE RELATED RISK FACTORS                                                       MOBILITY RELATED FACTORS  [ ] Age 41-60 years                                            (1 Point)                  [ ] Bed rest                                                        (1 Point)  [ ] Age: 61-74 years                                           (2 Points)                 [ ] Plaster cast                                                   (2 Points)  [x ] Age= 75 years                                              (3 Points)                 [x ] Bed bound for more than 72 hours                 (2 Points)    DISEASE RELATED RISK FACTORS                                               GENDER SPECIFIC FACTORS  [ ] Edema in the lower extremities                       (1 Point)                  [ ] Pregnancy                                                     (1 Point)  [ ] Varicose veins                                               (1 Point)                  [ ] Post-partum < 6 weeks                                   (1 Point)             [x ] BMI > 25 Kg/m2                                            (1 Point)                  [ ] Hormonal therapy  or oral contraception          (1 Point)                 [ ] Sepsis (in the previous month)                        (1 Point)                  [ ] History of pregnancy complications                 (1 point)  [ ] Pneumonia or serious lung disease                                               [ ] Unexplained or recurrent                     (1 Point)           (in the previous month)                               (1 Point)  [ ] Abnormal pulmonary function test                     (1 Point)                 SURGERY RELATED RISK FACTORS  [ ] Acute myocardial infarction                              (1 Point)                 [ ]  Section                                             (1 Point)  [ ] Congestive heart failure (in the previous month)  (1 Point)               [ ] Minor surgery                                                  (1 Point)   [ ] Inflammatory bowel disease                             (1 Point)                 [ ] Arthroscopic surgery                                        (2 Points)  [ ] Central venous access                                      (2 Points)                [ ] General surgery lasting more than 45 minutes   (2 Points)       [ ] Stroke (in the previous month)                          (5 Points)               [ ] Elective arthroplasty                                         (5 Points)                                                                                                                                               HEMATOLOGY RELATED FACTORS                                                 TRAUMA RELATED RISK FACTORS  [ ] Prior episodes of VTE                                     (3 Points)                [ ] Fracture of the hip, pelvis, or leg                       (5 Points)  [ ] Positive family history for VTE                         (3 Points)                 [ ] Acute spinal cord injury (in the previous month)  (5 Points)  [ ] Prothrombin 92509 A                                     (3 Points)                 [ ] Paralysis  (less than 1 month)                             (5 Points)  [ ] Factor V Leiden                                             (3 Points)                  [ ] Multiple Trauma within 1 month                        (5 Points)  [ ] Lupus anticoagulants                                     (3 Points)                                                           [ ] Anticardiolipin antibodies                               (3 Points)                                                       [ ] High homocysteine in the blood                      (3 Points)                                             [ ] Other congenital or acquired thrombophilia      (3 Points)                                                [ ] Heparin induced thrombocytopenia                  (3 Points)                                          Total Score [   6       ]    Caprini Score 0 - 2:  Low Risk, No VTE Prophylaxis required for most patients, encourage ambulation  Caprini Score 3 - 6:  At Risk, pharmacologic VTE prophylaxis is indicated for most patients (in the absence of a contraindication)  Caprini Score Greater than or = 7:  High Risk, pharmacologic VTE prophylaxis is indicated for most patients (in the absence of a contraindication) 82 yo female with HTN, HLD, CAD (remote MI and stents), DM, RA, CKD stage 3, Obesity presents from home for worsening right knee pain, swelling and inability to ambulate.     # Right knee pain: (osteoarthritis vs. RA flare vs. gout); CT R knee shows tricompartmental osteoarthritis, most advanced in the medial and patellofemoral compartments. Moderate to large nonspecific knee joint effusion. Uric acid elevated. Low suspicion for infectious/ septic arthritis given no fevers, normal wbc:  - continue colchicine for acute gout flare, pain mgmt with lidocaine patch, tylenol prn  - Rheumatology consulted for possible arthrocentesis  - PT recommends [facility] but pt refuses rehab, wants to go home, amenable to home PT    #CAD: with remote history of MI, cardiac stents. Stable, currently without chest pain  - continue aspirin, plavix, statin    #HTN with bradycardia:  - continue metoprolol (dose reduced from 25 to 12.5 mg bid due to bradycardia)  - change lisinopril to 5 mg. daily    #CKD stage 3: Cr 1.2-1.3 baseline  - stable  - continue lisinopril    #Rheumatoid arthritis: poor follow up, not on meds at home. Likely RA nodule seen on CXR  - Rheumatology consulted; pt. seen awaiting official consult for further recs    #HLD:  - continue statin    #DM type II, no on long term insulin use: Hba1C 6.1  - discontinue ISS    #Obesity: BMI (kg/m2): 37.1 (10-13-19 @ 11:27), 28.3 (10-12-19 @ 17:55)  - outpatient exercise/ weight loss program    #DVT Ppx:  - lovenox SC

## 2019-10-17 NOTE — CONSULT NOTE ADULT - ASSESSMENT
82 yo F with prior dx of ?RA however does appear to clinically have symptoms, prior hx of gout with flare approx 1 year ago, admitted with severe R knee pain limiting ambulation. On exam no overt synovitis, small effusion, and most TTP over popliteal area with fullness, suspect popliteal cyst as possible etiology of pain. Exam and CT consistent with advanced OA in R knee, ?area of lucency. Do not feel this is a crystalline flare or RA flare. Low suspicion for septic joint given exam. Pt is refusing arthrocentesis. ESR/CRP elevation is non-specific but marked.     - recommend lidoderm patch prn and tylenol for pain, PT as tolerated to improve mobility  - to be thorough, given prior ?RA dx will check CCP and XR hands/wrists to eval for erosive dz, RF negative  - given marked ESR elevation, check SPEP, UPEP   - would discontinue steroids at present  - MRI knee as recommended by CT read if feasible   - if pt becomes amenable to arthrocentesis or worsening pain/effusion/synovitis, please re-consult

## 2019-10-17 NOTE — CONSULT NOTE ADULT - SUBJECTIVE AND OBJECTIVE BOX
*** Patient seen on 10/16/19. Note entered later that day, does not seem to have been saved in the system. This is a re-entered document for that evaluation.     PATRICK LUCILLE  [id number]    HISTORY OF PRESENT ILLNESS: 82 y/o female with HTN, CAD, DM, "rheumatism", Obesity presents from home as she was unable to ambulate due to worsening right knee pain on lateral side and posterior aspect for multiple days.  She states she lives alone, normally independent in all ADLs but has been severely limited by this pain. Denies falling or other trauma to the knee, no recent infections. No other affected joints including other knee, ankles, toes. Reports a history of rheumatism and Gout. Pt is a poor historian, unable to give more information about when her last gout flare was, which have been the most involved joints, if crystal proven, or if she has ever been on colchicine, ULT, or steroids. Review of EMR shows admission/ED evaluation for foot/ankle pain and swelling that appears to be consistent with gout approx 1 year ago, recent ED visit for b/l knee pain. Denies workup with rheumatologist for the conditions in the past. Has also not been to see any doctors since her PMD,  Dr. Ivory, retired, and has been off all meds.     PAST MEDICAL & SURGICAL HISTORY:  HLD (hyperlipidemia)  Thyroid disease  Pulmonary embolism  Hypertension  DM (diabetes mellitus)  MI (myocardial infarction)  CAD (coronary artery disease)  Obesity  Osteoarthritis  History of hysterectomy      Review of Systems:  Gen:  No fevers/chills, weight loss  HEENT: No blurry vision, no difficulty swallowing  CVS: No chest pain/palpitations  Resp: No SOB/wheezing  GI: No N/V/C/D/abdominal pain  MSK: R posterior/lateral knee pain, no other painful joints  Skin: No new rashes  Neuro: No headaches    MEDICATIONS  (STANDING):  aspirin enteric coated 81 milliGRAM(s) Oral daily  atorvastatin 40 milliGRAM(s) Oral at bedtime  clopidogrel Tablet 75 milliGRAM(s) Oral daily  colchicine 0.6 milliGRAM(s) Oral daily  dextrose 5%. 1000 milliLiter(s) (50 mL/Hr) IV Continuous <Continuous>  dextrose 50% Injectable 12.5 Gram(s) IV Push once  dextrose 50% Injectable 25 Gram(s) IV Push once  dextrose 50% Injectable 25 Gram(s) IV Push once  docusate sodium 100 milliGRAM(s) Oral three times a day  enoxaparin Injectable 40 milliGRAM(s) SubCutaneous daily  famotidine    Tablet 20 milliGRAM(s) Oral daily  lidocaine   Patch 1 Patch Transdermal daily  lisinopril 5 milliGRAM(s) Oral daily  metoprolol tartrate 12.5 milliGRAM(s) Oral two times a day    MEDICATIONS  (PRN):  acetaminophen   Tablet .. 650 milliGRAM(s) Oral every 6 hours PRN Mild Pain (1 - 3)  dextrose 40% Gel 15 Gram(s) Oral once PRN Blood Glucose LESS THAN 70 milliGRAM(s)/deciliter  glucagon  Injectable 1 milliGRAM(s) IntraMuscular once PRN Glucose LESS THAN 70 milligrams/deciliter  oxyCODONE    5 mG/acetaminophen 325 mG 1 Tablet(s) Oral every 6 hours PRN Moderate Pain (4 - 6)  senna 2 Tablet(s) Oral at bedtime PRN Constipation      Allergies--sulfa drugs (Rash)    FAMILY HISTORY: No pertinent family history of gout or AI dz      Vital Signs Last 24 Hrs  T(C): 37.1 (17 Oct 2019 15:32), Max: 37.1 (17 Oct 2019 15:32)  T(F): 98.7 (17 Oct 2019 15:32), Max: 98.7 (17 Oct 2019 15:32)  HR: 64 (17 Oct 2019 15:32) (47 - 64)  BP: 126/62 (17 Oct 2019 15:32) (118/65 - 143/54)  RR: 15 (17 Oct 2019 15:32) (15 - 17)  SpO2: 100% (17 Oct 2019 15:32) (98% - 100%)    Daily Weight in k (17 Oct 2019 10:38)    Physical Exam:  General: No apparent distress, AAOx 2  HEENT: EOMI, MMM  CVS: +S1/S2, RRR  Resp: CTA b/l  GI: Soft, NT/ND  MSK: + TTP and fullness over popliteal region of R knee, small anterior effusion but no warmth or overt TTP. No TTP over joint line laterally/medially. + crepitus and bony enlargement. L knee unaffected. No other synovitis, ROM intact. No tophi appreciated. OA changes in hands, no chronic deformity consistent with RA appreciated.   Neuro: pt not cooperative with strength or ROM exams   Skin: no  rashes    LABS:                        11.9   7.32  )-----------( 199      ( 16 Oct 2019 07:16 )             35.1     10-    141  |  107  |  20  ----------------------------<  113<H>  4.0   |  24  |  1.28    Ca    9.6      16 Oct 2019 07:16  Mg     2.2     10    Sedimentation Rate, Erythrocyte: 120 mm/hr (10.17.19 @ 06:50)    C-Reactive Protein, Serum (10.17.19 @ 06:50)    C-Reactive Protein, Serum: 1.45 mg/dL    Rheumatoid Factor Quant, Serum or Plasma in AM (10.17.19 @ 06:50)    Rheumatoid Factor Quant, Serum or Plasma: <10: Test Repeated IU/mL    RADIOLOGY & ADDITIONAL STUDIES:  < from: CT Knee No Cont, Right (10.15.19 @ 09:41) >  FINDINGS: No acute fracture or dislocation is demonstrated. There is a   moderate to large nonspecific knee joint effusion.     There is nonspecific rounded lucent lesion in the central aspect the   lateral femoral condyle spanning 2.0 x 1.7 x 1.5 cm. This is not in a   subchondral location.    There is tricompartmental osteoarthritis, most advanced in the medial and   patellofemoral compartments.    There are atherosclerotic vascular calcifications.    IMPRESSION: Nonspecific rounded lucent lesion in the lateral femoral   condyle, as above. Benign and malignant processes are in the differential   diagnosis. MRI with contrast is recommended to further characterize.  No acute fracture or dislocation.  Moderate to large nonspecific knee joint effusion.    < end of copied text >

## 2019-10-18 DIAGNOSIS — I25.10 ATHEROSCLEROTIC HEART DISEASE OF NATIVE CORONARY ARTERY WITHOUT ANGINA PECTORIS: ICD-10-CM

## 2019-10-18 DIAGNOSIS — E11.9 TYPE 2 DIABETES MELLITUS WITHOUT COMPLICATIONS: ICD-10-CM

## 2019-10-18 DIAGNOSIS — E66.9 OBESITY, UNSPECIFIED: ICD-10-CM

## 2019-10-18 DIAGNOSIS — M25.569 PAIN IN UNSPECIFIED KNEE: ICD-10-CM

## 2019-10-18 DIAGNOSIS — I10 ESSENTIAL (PRIMARY) HYPERTENSION: ICD-10-CM

## 2019-10-18 DIAGNOSIS — M06.9 RHEUMATOID ARTHRITIS, UNSPECIFIED: ICD-10-CM

## 2019-10-18 DIAGNOSIS — E78.5 HYPERLIPIDEMIA, UNSPECIFIED: ICD-10-CM

## 2019-10-18 DIAGNOSIS — R22.0 LOCALIZED SWELLING, MASS AND LUMP, HEAD: ICD-10-CM

## 2019-10-18 DIAGNOSIS — M25.561 PAIN IN RIGHT KNEE: ICD-10-CM

## 2019-10-18 DIAGNOSIS — N18.9 CHRONIC KIDNEY DISEASE, UNSPECIFIED: ICD-10-CM

## 2019-10-18 LAB
% ALBUMIN: 44.6 % — SIGNIFICANT CHANGE UP
% ALPHA 1: 5.4 % — SIGNIFICANT CHANGE UP
% ALPHA 2: 12.7 % — SIGNIFICANT CHANGE UP
% BETA: 14.4 % — SIGNIFICANT CHANGE UP
% GAMMA: 22.9 % — SIGNIFICANT CHANGE UP
ALBUMIN SERPL ELPH-MCNC: 3.2 G/DL — LOW (ref 3.6–5.5)
ALBUMIN/GLOB SERPL ELPH: 0.8 RATIO — SIGNIFICANT CHANGE UP
ALPHA1 GLOB SERPL ELPH-MCNC: 0.4 G/DL — SIGNIFICANT CHANGE UP (ref 0.1–0.4)
ALPHA2 GLOB SERPL ELPH-MCNC: 0.9 G/DL — SIGNIFICANT CHANGE UP (ref 0.5–1)
B-GLOBULIN SERPL ELPH-MCNC: 1 G/DL — SIGNIFICANT CHANGE UP (ref 0.5–1)
GAMMA GLOBULIN: 1.6 G/DL — SIGNIFICANT CHANGE UP (ref 0.6–1.6)
INTERPRETATION SERPL IFE-IMP: SIGNIFICANT CHANGE UP
PROT PATTERN SERPL ELPH-IMP: SIGNIFICANT CHANGE UP
PROT SERPL-MCNC: 7.2 G/DL — SIGNIFICANT CHANGE UP (ref 6–8.3)
PROT SERPL-MCNC: 7.2 G/DL — SIGNIFICANT CHANGE UP (ref 6–8.3)

## 2019-10-18 PROCEDURE — 99291 CRITICAL CARE FIRST HOUR: CPT

## 2019-10-18 PROCEDURE — 90792 PSYCH DIAG EVAL W/MED SRVCS: CPT

## 2019-10-18 RX ORDER — METOPROLOL TARTRATE 50 MG
5 TABLET ORAL EVERY 6 HOURS
Refills: 0 | Status: DISCONTINUED | OUTPATIENT
Start: 2019-10-18 | End: 2019-10-20

## 2019-10-18 RX ORDER — DIPHENHYDRAMINE HCL 50 MG
50 CAPSULE ORAL EVERY 6 HOURS
Refills: 0 | Status: DISCONTINUED | OUTPATIENT
Start: 2019-10-18 | End: 2019-10-18

## 2019-10-18 RX ORDER — HEPARIN SODIUM 5000 [USP'U]/ML
5000 INJECTION INTRAVENOUS; SUBCUTANEOUS EVERY 8 HOURS
Refills: 0 | Status: DISCONTINUED | OUTPATIENT
Start: 2019-10-18 | End: 2019-10-21

## 2019-10-18 RX ORDER — GLUCAGON INJECTION, SOLUTION 0.5 MG/.1ML
3 INJECTION, SOLUTION SUBCUTANEOUS ONCE
Refills: 0 | Status: DISCONTINUED | OUTPATIENT
Start: 2019-10-18 | End: 2019-10-20

## 2019-10-18 RX ORDER — INSULIN LISPRO 100/ML
VIAL (ML) SUBCUTANEOUS EVERY 6 HOURS
Refills: 0 | Status: DISCONTINUED | OUTPATIENT
Start: 2019-10-18 | End: 2019-10-20

## 2019-10-18 RX ORDER — DIPHENHYDRAMINE HCL 50 MG
25 CAPSULE ORAL EVERY 6 HOURS
Refills: 0 | Status: DISCONTINUED | OUTPATIENT
Start: 2019-10-18 | End: 2019-10-19

## 2019-10-18 RX ORDER — FAMOTIDINE 10 MG/ML
20 INJECTION INTRAVENOUS
Refills: 0 | Status: DISCONTINUED | OUTPATIENT
Start: 2019-10-18 | End: 2019-10-19

## 2019-10-18 RX ORDER — DIPHENHYDRAMINE HCL 50 MG
50 CAPSULE ORAL ONCE
Refills: 0 | Status: DISCONTINUED | OUTPATIENT
Start: 2019-10-18 | End: 2019-10-18

## 2019-10-18 RX ADMIN — LISINOPRIL 5 MILLIGRAM(S): 2.5 TABLET ORAL at 06:07

## 2019-10-18 RX ADMIN — Medication 100 MILLIGRAM(S): at 06:07

## 2019-10-18 NOTE — CONSULT NOTE ADULT - ASSESSMENT
HPI:  81 y/o female with HTN, CAD, DM, RA, Obesity presents from home for unable to ambulate.  Patient states she has been having worsening right knee pain on lateral side for multiple days.  She states she lives alone and unable to ambulate.  She states she does have a history of RA and Gout.   Patient has NOT followed up with her primary care physician because Dr. Ivory no longer sees patient.   Denies trauma to knee.     Patient was seen approximately 1 year ago for similar, found to have a gout flare/RA flare.     Patient has NOT been taking her medications for several months. (14 Oct 2019 16:47)    PROCEDURE:    POD#  PAST MEDICAL & SURGICAL HISTORY:  HLD (hyperlipidemia)  Thyroid disease  Pulmonary embolism  Hypertension  DM (diabetes mellitus)  MI (myocardial infarction)  CAD (coronary artery disease)  Obesity  Rheumatoid arthritis  History of hysterectomy      Assessment:  Please Check When Present   []  GCS  E   V  M     Heart Failure: []Acute, [] acute on chronic , []chronic  Heart Failure:  [] Diastolic (HFpEF), [] Systolic (HFrEF), []Combined (HFpEF and HFrEF), [] RHF, [] Pulm HTN, [] Other    [] SHAWN, [] ATN, [] AIN, [] other  []xxxxxxxxxxx CKD1, [] CKD2, [] CKD 3, [] CKD 4, [] CKD 5, []ESRD    Encephalopathy: [] Metabolic, [] Hepatic, [] toxic, [] Neurological, [] Other    Abnormal Nurtitional Status: [] malnurtition (see nutrition note), [ ]underweight: BMI < 19, [] morbid obesity: BMI >40, [] Cachexia    [] Sepsis  [] hypovolemic shock,[] cardiogenic shock, [] hemorrhagic shock, [] neuogenic shock  [] Acute Respiratory Failure  []Cerebral edema, [] Brain compression/ herniation,   [] Functional quadriplegia  [] Acute blood loss anemia      PLAN:  Neuro: psych consult for paranoia and capacity to make medical decisions in her best interest  Respiratory: stable breathing and aiway control.  concern for worsening angioedema and potential airway compromise/respiratory failure.  pt. to be transferred to icu for airway monitoring, solumedrol/benadryl/pepcid.  may be response to increased dose ACE, food item, allopurinol?  CV:stable.  will use prn iv meds (beta blocker)  for now to control bp.  asa/plavix/statin.  Endocrine: humalog SS.  glucose likely to incr. with steroid.    Heme/Onc:      stable       DVT ppx: SQH  Renal: Cr 1.2 stable  ID: afebrile  GI:  diabetic diet tomorrow once airway deemed safe, bowel regimen  PT/OT: mobilize tomorrow.  ct of R knee noted.  may benefit from ortho followup/mri as outpatient to evaluate lesion in femoral condyle.  will hold gout meds for now.  Will discuss with ____      Spectralink # HPI:  83 y/o female with HTN, CAD, DM, RA, Obesity presents from home for unable to ambulate.  Patient states she has been having worsening right knee pain on lateral side for multiple days.  She states she lives alone and unable to ambulate.  She states she does have a history of RA and Gout.   Patient has NOT followed up with her primary care physician because Dr. Ivory no longer sees patient.   Denies trauma to knee.     Patient was seen approximately 1 year ago for similar, found to have a gout flare/RA flare.     Patient has NOT been taking her medications for several months. (14 Oct 2019 16:47)    PROCEDURE:    POD#  PAST MEDICAL & SURGICAL HISTORY:  HLD (hyperlipidemia)  Thyroid disease  Pulmonary embolism  Hypertension  DM (diabetes mellitus)  MI (myocardial infarction)  CAD (coronary artery disease)  Obesity  Rheumatoid arthritis  History of hysterectomy      Assessment:  Please Check When Present   []  GCS  E   V  M     Heart Failure: []Acute, [] acute on chronic , []chronic  Heart Failure:  [] Diastolic (HFpEF), [] Systolic (HFrEF), []Combined (HFpEF and HFrEF), [] RHF, [] Pulm HTN, [] Other    [] SHAWN, [] ATN, [] AIN, [] other  []xxxxxxxxxxx CKD1, [] CKD2, [] CKD 3, [] CKD 4, [] CKD 5, []ESRD    Encephalopathy: [] Metabolic, [] Hepatic, [] toxic, [] Neurological, [] Other    Abnormal Nurtitional Status: [] malnurtition (see nutrition note), [ ]underweight: BMI < 19, [] morbid obesity: BMI >40, [] Cachexia    [] Sepsis  [] hypovolemic shock,[] cardiogenic shock, [] hemorrhagic shock, [] neuogenic shock  [] Acute Respiratory Failure  []Cerebral edema, [] Brain compression/ herniation,   [] Functional quadriplegia  [] Acute blood loss anemia      PLAN:  Neuro: psych consult for paranoia and capacity to make medical decisions in her best interest.  Respiratory: stable breathing and aiway control.  concern for worsening angioedema and potential airway compromise/respiratory failure.  pt. to be transferred to icu for airway monitoring, solumedrol/benadryl/pepcid.  may be response to increased dose ACE, food item, allopurinol?  CV:stable.  will use prn iv meds (beta blocker)  for now to control bp.  asa/plavix/statin.  Endocrine: humalog SS.  glucose likely to incr. with steroid.    Heme/Onc:      stable       DVT ppx: SQH  Renal: Cr 1.2 stable  ID: afebrile  GI:  diabetic diet tomorrow once airway deemed safe, bowel regimen  PT/OT: mobilize tomorrow.  ct of R knee noted.  may benefit from ortho followup/mri as outpatient to evaluate lesion in femoral condyle.   RHEUM:   will hold gout meds for now.  esr/crp elevated.  Will discuss with ____icu attending      Margarita # HPI:  81 y/o female with HTN, CAD, DM, RA, Obesity presents from home for unable to ambulate.  Patient states she has been having worsening right knee pain on lateral side for multiple days.  She states she lives alone and unable to ambulate.  She states she does have a history of RA and Gout.   Patient has NOT followed up with her primary care physician because Dr. Ivory no longer sees patient.   Denies trauma to knee.     Patient was seen approximately 1 year ago for similar, found to have a gout flare/RA flare.     Patient has NOT been taking her medications for several months. (14 Oct 2019 16:47)      PAST MEDICAL & SURGICAL HISTORY:  HLD (hyperlipidemia)  Thyroid disease  Pulmonary embolism  Hypertension  DM (diabetes mellitus)  MI (myocardial infarction)  CAD (coronary artery disease)  Obesity  Rheumatoid arthritis  History of hysterectomy      Assessment:  Please Check When Present   []  GCS  E   V  M     Heart Failure: []Acute, [] acute on chronic , []chronic  Heart Failure:  [] Diastolic (HFpEF), [] Systolic (HFrEF), []Combined (HFpEF and HFrEF), [] RHF, [] Pulm HTN, [] Other    [] SHAWN, [] ATN, [] AIN, [] other  []xxxxxxxxxxx CKD1, [] CKD2, [] CKD 3, [] CKD 4, [] CKD 5, []ESRD    Encephalopathy: [] Metabolic, [] Hepatic, [] toxic, [] Neurological, [] Other    Abnormal Nurtitional Status: [] malnurtition (see nutrition note), [ ]underweight: BMI < 19, [] morbid obesity: BMI >40, [] Cachexia    [] Sepsis  [] hypovolemic shock,[] cardiogenic shock, [] hemorrhagic shock, [] neuogenic shock  [] Acute Respiratory Failure  []Cerebral edema, [] Brain compression/ herniation,   [] Functional quadriplegia  [] Acute blood loss anemia      PLAN:  Neuro: psych consult for paranoia and capacity to make medical decisions in her best interest.  Respiratory: stable breathing and aiway control.  concern for worsening angioedema and potential airway compromise/respiratory failure.  pt. to be transferred to icu for airway monitoring, solumedrol/benadryl/pepcid.  may be response to increased dose ACE, food item, allopurinol?  CV:stable.  will use prn iv meds (beta blocker)  for now to control bp.  asa/plavix/statin.  Endocrine: humalog SS.  glucose likely to incr. with steroid.    Heme/Onc:      stable       DVT ppx: SQH  Renal: Cr 1.2 stable  ID: afebrile  GI:  diabetic diet tomorrow once airway deemed safe, bowel regimen  PT/OT: mobilize tomorrow.  ct of R knee noted.  may benefit from ortho followup/mri as outpatient to evaluate lesion in femoral condyle.   RHEUM:   will hold gout meds for now.  esr/crp elevated.  Will discuss with ____icu attending      Margarita #

## 2019-10-18 NOTE — PROGRESS NOTE ADULT - ASSESSMENT
84 yo female with HTN, HLD, CAD, Type 2 DM, RA, CKD stage 3, Obesity presents from home for worsening right knee pain, swelling and inability to ambulate, now with oral lip swelling

## 2019-10-18 NOTE — PROGRESS NOTE ADULT - SUBJECTIVE AND OBJECTIVE BOX
Complaining of lip swelling this AM and right knee pain which she states is unchanged.  No SOB, chest pain, nausea, vomiting, abdominal pain, lightheadedness, dizziness.  Has refused all medications and most food since yesterday, did take Zestril 5mg PO x1 this AM.  OOB chair with walker and maximum assist, voiding and continent of urine, BM documented yesterday.    Vital Signs Last 24 Hrs  T(F): 98.3 (18 Oct 2019 06:03), Max: 98.7 (17 Oct 2019 15:32)  HR: 58 (18 Oct 2019 06:03) (57 - 64)  BP: 124/50 (18 Oct 2019 06:03) (124/50 - 147/55)  RR: 15 (18 Oct 2019 06:03) (15 - 15)  SpO2: 100% RA (18 Oct 2019 06:03) (100% - 100%)    Labs    TPro  7.2  /  Alb  x   /  TBili  x   /  DBili  x   /  AST  x   /  ALT  x   /  AlkPhos  x   10-18    MEDICATIONS  (STANDING):  aspirin enteric coated 81 milliGRAM(s) Oral daily  atorvastatin 40 milliGRAM(s) Oral at bedtime  clopidogrel Tablet 75 milliGRAM(s) Oral daily  colchicine 0.6 milliGRAM(s) Oral daily  docusate sodium 100 milliGRAM(s) Oral three times a day  enoxaparin Injectable 40 milliGRAM(s) SubCutaneous daily  famotidine    Tablet 20 milliGRAM(s) Oral daily  lidocaine   Patch 1 Patch Transdermal daily  metoprolol tartrate 12.5 milliGRAM(s) Oral two times a day  acetaminophen   Tablet .. 650 milliGRAM(s) Oral every 6 hours PRN Mild Pain (1 - 3)  oxyCODONE    5 mG/acetaminophen 325 mG 1 Tablet(s) Oral every 6 hours PRN Moderate Pain (4 - 6)  senna 2 Tablet(s) Oral at bedtime PRN Constipation    Physical Exam  Gen:  WN/WD female sitting in luann, NAD  ENT:  NC/AT, no JVD noted.  No stridor, lips asymmetrically enlarged and erythematous   Thorax:  Symmetric, no retractions  Lung:  CTA b/l  CV:  S1, S2. RRR  Abd:  Soft, NT/ND.  BS normoactive, no masses to palp  Extrem:  No C/C/E, DP/radial pulses +2  Neuro:  A&O x 3, no gross motor/sensory deficits Complaining of lip swelling this AM and right knee pain which she states is unchanged.  No SOB, chest pain, nausea, vomiting, abdominal pain, lightheadedness, dizziness.  Has refused all medications and most food since yesterday, did take Zestril 5mg PO x1 this AM.  OOB chair with walker and maximum assist, voiding and continent of urine, BM documented yesterday.    Vital Signs Last 24 Hrs  T(F): 98.3 (18 Oct 2019 06:03), Max: 98.7 (17 Oct 2019 15:32)  HR: 58 (18 Oct 2019 06:03) (57 - 64)  BP: 124/50 (18 Oct 2019 06:03) (124/50 - 147/55)  RR: 15 (18 Oct 2019 06:03) (15 - 15)  SpO2: 100% RA (18 Oct 2019 06:03) (100% - 100%)    Labs    TPro  7.2  /  Alb  x   /  TBili  x   /  DBili  x   /  AST  x   /  ALT  x   /  AlkPhos  x   10-18    MEDICATIONS  (STANDING):  aspirin enteric coated 81 milliGRAM(s) Oral daily  atorvastatin 40 milliGRAM(s) Oral at bedtime  clopidogrel Tablet 75 milliGRAM(s) Oral daily  colchicine 0.6 milliGRAM(s) Oral daily  docusate sodium 100 milliGRAM(s) Oral three times a day  enoxaparin Injectable 40 milliGRAM(s) SubCutaneous daily  famotidine    Tablet 20 milliGRAM(s) Oral daily  lidocaine   Patch 1 Patch Transdermal daily  metoprolol tartrate 12.5 milliGRAM(s) Oral two times a day  acetaminophen   Tablet .. 650 milliGRAM(s) Oral every 6 hours PRN Mild Pain (1 - 3)  oxyCODONE    5 mG/acetaminophen 325 mG 1 Tablet(s) Oral every 6 hours PRN Moderate Pain (4 - 6)  senna 2 Tablet(s) Oral at bedtime PRN Constipation    Physical Exam  Gen:  WN/WD female sitting in luann, NAD  ENT:  NC/AT, no JVD noted.  No stridor, no dysarthria, lips asymmetrically enlarged and erythematous   Thorax:  Symmetric, no retractions  Lung:  CTA b/l  CV:  S1, S2. RRR  Abd:  Soft, NT/ND.  BS normoactive, no masses to palp  Extrem:  No C/C/E, DP/radial pulses +2  Neuro:  A&O x 3, no gross motor/sensory deficits

## 2019-10-18 NOTE — CONSULT NOTE ADULT - SUBJECTIVE AND OBJECTIVE BOX
Psychiatric Consult Note:    Identifying Data: 84yo  Domiciled, Retired, AA Female.     History of Present Illness:  83 y/o female with HTN, CAD, DM, RA, Obesity presents from home for unable to ambulate.  Patient states she has been having worsening right knee pain on lateral side for multiple days.  She states she lives alone and unable to ambulate.  She states she does have a history of RA and Gout.   Patient has NOT followed up with her primary care physician because Dr. Ivory no longer sees patient.   Denies trauma to knee.     Patient was seen approximately 1 year ago for similar, found to have a gout flare/RA flare.   Patient has NOT been taking her medications for several months. (14 Oct 2019 16:47)      Chief Complaint/Reason for Consult: " They are playing games, I didn't ask to come over here."     Health Issues: Difficulty in walking  HLD (hyperlipidemia)  Thyroid disease  Pulmonary embolism  Hypertension  Hyperthyroidism  DM (diabetes mellitus)  MI (myocardial infarction)  CAD (coronary artery disease)  Obesity  Rheumatoid arthritis  CKD (chronic kidney disease)  Lip swelling  History of hysterectomy  LEG PAIN  Arthritis of knee    Psychiatric Review of Systems:  Writer asked to see patient acutely due to paranoid ideation, refusal of treatment and to comment on capacity.   Pt was initially described as A & O x 3. This am pt states she ate breakfast and then noted her upper  lip started to swell. Pt is not sure what is occurring but she is perseverative about being transferred from [address] [place] [address] on to the ICU. " My name was on the board on the other unit."  She states she is not sick and does not need this level of monitoring, implies that the hospital is also doing this to gain money or pad the bill. Pt also tells writer a convoluted story involving another patient on [address] [place] [address] that she knows. Her now  boyfriend used to care give for this patient, and she somehow feels his where abouts were deliberately kept from her and in some way involves her current plight and the fact that her  boyfriends children "don't like me".  She reports she has people visiting from [place] and she needs to go home to let them in as she has the keys. At present time patient is unable to be reasoned with, is also suspicious as she feels the doctors involved in her care "look like foreigners".     Current medications: acetaminophen   Tablet .. 650 milliGRAM(s) Oral every 6 hours PRN  aspirin enteric coated 81 milliGRAM(s) Oral daily  atorvastatin 40 milliGRAM(s) Oral at bedtime  clopidogrel Tablet 75 milliGRAM(s) Oral daily  dextrose 40% Gel 15 Gram(s) Oral once PRN  dextrose 5%. 1000 milliLiter(s) IV Continuous <Continuous>  dextrose 50% Injectable 12.5 Gram(s) IV Push once  dextrose 50% Injectable 25 Gram(s) IV Push once  dextrose 50% Injectable 25 Gram(s) IV Push once  diphenhydrAMINE 25 milliGRAM(s) Oral every 6 hours  diphenhydrAMINE   Injectable 50 milliGRAM(s) IV Push every 6 hours  diphenhydrAMINE   Injectable 50 milliGRAM(s) IV Push once  docusate sodium 100 milliGRAM(s) Oral three times a day  famotidine    Tablet 20 milliGRAM(s) Oral two times a day  famotidine  IVPB 20 milliGRAM(s) IV Intermittent two times a day  glucagon  Injectable 3 milliGRAM(s) IV Push once  glucagon  Injectable 1 milliGRAM(s) IntraMuscular once PRN  heparin  Injectable 5000 Unit(s) SubCutaneous every 8 hours  insulin lispro (HumaLOG) corrective regimen sliding scale   SubCutaneous every 6 hours  lidocaine   Patch 1 Patch Transdermal daily  methylPREDNISolone sodium succinate Injectable 125 milliGRAM(s) IV Push once  methylPREDNISolone sodium succinate Injectable 40 milliGRAM(s) IV Push every 8 hours  metoprolol tartrate 12.5 milliGRAM(s) Oral two times a day  metoprolol tartrate Injectable 5 milliGRAM(s) IV Push every 6 hours PRN  oxyCODONE    5 mG/acetaminophen 325 mG 1 Tablet(s) Oral every 6 hours PRN  predniSONE   Tablet 60 milliGRAM(s) Oral once  senna 2 Tablet(s) Oral at bedtime PRN      Labs:    Sed rate 120    TPro  7.2  /  Alb  3.2<L>  /  TBili  x   /  DBili  x   /  AST  x   /  ALT  x   /  AlkPhos  x   10-    Vital Signs Last 24 hours: T(C): 36.8 (10-18-19 @ 15:42), Max: 37 (10-17-19 @ 21:55)  HR: 73 (10-18-19 @ 18:00) (57 - 73)  BP: 91/62 (10-18-19 @ 18:00) (91/48 - 147/55)  RR: 11 (10-18-19 @ 18:00) (11 - 20)  SpO2: 99% (10-18-19 @ 18:00) (99% - 100%)    Allergies: Berries (Angioedema (Mild to Mod))  sulfa drugs (Rash)      Past Psychiatric and Psychosocial and  Substance use History: As per her son Andrea, no past psychiatric history, although he states her memory and   concentration are increasingly poor. He also states she at times has forgotten his phone number. 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   Pt did have a significant other - Mr. Araujo who  recently of cancer.  She does know a particular patient [address] who is currently hospitalized.   Pt is  and resides alone.  Per son pt will drink a beer on occasion, but does not have a substance use history.       Current Mental Status Exam:  Appearance:- overweight AA female, appears slightly  younger than stated age, good eye contact. Dressed in hospital gown.   Attitude: - cooperative with writer but not with staff, refused IV, and has refused po medications as well ( " I have a bag of medications at home from [facility].")  Gait/Station: - not tested,  bedrest.   Motor Activity: - + psychomotor restlessness.   Affect: - tense  Mood: -irritable.   Speech: -has a lisp due to the swelling of her upper lip, difficult to understand, speaks about people as if writer were acquainted with them.  Thought process: -illogical.   Thought content/perceptions: confused, see HPI for details.   Hallucinations: not present.  Delusions: systematized persecutory and referential delusions about her  significant other's children.   Suicidal ideation: denied  Homicidal ideation:   denied  Sensorium: alert, drowsy, lethargic, unarousable  Orientation: x 3  Attention: impaired  Concentration: impaired  Memory: unable to assess due to level of perseveration, unable to move patient from topic.   Insight/Judgment: poor. Does not understand or appreciate need for monitoring of allergic reaction has psychotic interpretation of real events.     Assessment and Plan: Psychosis NOS   r/o delirium, r/o reaction to steroids and or pain medications. Per son this is not her baseline other than" being stubborn and eating and caring for herself as she pleases. "   Pt may also have the beginnings of dementia. Pt son states [facility] and will visit tomorrow. Pt does not have anyone visiting from [place].   He can be contacted to assist with her management. Andrea [phone].     Follow up status: Will follow with you.     Other recommendations: seek substitutive judgement regarding decision making capacity.   Can give Zyprexa 5 mg q 6 prn agitation, max dose 20 mg in 24 hour period.     Level of observation:  may need enhanced supervision or if AWOL risk- 1:1.   Pt to have abnormality in bone followed up as an outpatient, will need to involve her son in her care post discharge, case   coordinator should reach out to son regarding safe d/c planning.

## 2019-10-18 NOTE — CONSULT NOTE ADULT - SUBJECTIVE AND OBJECTIVE BOX
ICU CONSULT    Initial HPI on admission:  HPI:  83 y/o female with HTN, CAD, DM, RA, Obesity presents from home for unable to ambulate.  Patient states she has been having worsening right knee pain on lateral side for multiple days.  She states she lives alone and unable to ambulate.  She states she does have a history of RA and Gout.   Patient has NOT followed up with her primary care physician because Dr. Ivory no longer sees patient.   Denies trauma to knee.     Patient was seen approximately 1 year ago for similar, found to have a gout flare/RA flare.     Patient has NOT been taking her medications for several months. (14 Oct 2019 16:47)      BRIEF HOSPITAL COURSE: ***    PAST MEDICAL & SURGICAL HISTORY:  HLD (hyperlipidemia)  Thyroid disease  Pulmonary embolism  Hypertension  DM (diabetes mellitus)  MI (myocardial infarction)  CAD (coronary artery disease)  Obesity  Rheumatoid arthritis  History of hysterectomy    Allergies    sulfa drugs (Rash)    Intolerances      FAMILY HISTORY:  No pertinent family history in first degree relatives    Social history:     Review of Systems:  CONSTITUTIONAL: No fever, chills, or fatigue  EYES: No eye pain, visual disturbances, or discharge  ENMT:  No difficulty hearing, tinnitus, vertigo; No sinus or throat pain  NECK: No pain or stiffness  RESPIRATORY: Per above  CARDIOVASCULAR: No chest pain, palpitations, dizziness, or leg swelling  GASTROINTESTINAL: No abdominal or epigastric pain. No nausea, vomiting, or hematemesis; No diarrhea or constipation. No melena or hematochezia.  GENITOURINARY: No dysuria, frequency, hematuria, or incontinence  NEUROLOGICAL: No headaches, memory loss, loss of strength, numbness, or tremors  SKIN: No itching, burning, rashes, or lesions   MUSCULOSKELETAL: No joint pain or swelling; No muscle, back, or extremity pain  PSYCHIATRIC: No depression, anxiety, mood swings, or difficulty sleeping      Medications:  MEDICATIONS  (STANDING):  aspirin enteric coated 81 milliGRAM(s) Oral daily  atorvastatin 40 milliGRAM(s) Oral at bedtime  clopidogrel Tablet 75 milliGRAM(s) Oral daily  colchicine 0.6 milliGRAM(s) Oral daily  dextrose 5%. 1000 milliLiter(s) (50 mL/Hr) IV Continuous <Continuous>  dextrose 50% Injectable 12.5 Gram(s) IV Push once  dextrose 50% Injectable 25 Gram(s) IV Push once  dextrose 50% Injectable 25 Gram(s) IV Push once  diphenhydrAMINE   Injectable 50 milliGRAM(s) IV Push once  diphenhydrAMINE   Injectable 50 milliGRAM(s) IV Push every 6 hours  docusate sodium 100 milliGRAM(s) Oral three times a day  famotidine  IVPB 20 milliGRAM(s) IV Intermittent two times a day  glucagon  Injectable 3 milliGRAM(s) IV Push once  heparin  Injectable 5000 Unit(s) SubCutaneous every 8 hours  lidocaine   Patch 1 Patch Transdermal daily  methylPREDNISolone sodium succinate Injectable 125 milliGRAM(s) IV Push once  methylPREDNISolone sodium succinate Injectable 40 milliGRAM(s) IV Push every 8 hours  metoprolol tartrate 12.5 milliGRAM(s) Oral two times a day    MEDICATIONS  (PRN):  acetaminophen   Tablet .. 650 milliGRAM(s) Oral every 6 hours PRN Mild Pain (1 - 3)  dextrose 40% Gel 15 Gram(s) Oral once PRN Blood Glucose LESS THAN 70 milliGRAM(s)/deciliter  glucagon  Injectable 1 milliGRAM(s) IntraMuscular once PRN Glucose LESS THAN 70 milligrams/deciliter  oxyCODONE    5 mG/acetaminophen 325 mG 1 Tablet(s) Oral every 6 hours PRN Moderate Pain (4 - 6)  senna 2 Tablet(s) Oral at bedtime PRN Constipation            Vital Signs Last 24 Hrs  T(C): 36.8 (18 Oct 2019 06:03), Max: 37.1 (17 Oct 2019 15:32)  T(F): 98.3 (18 Oct 2019 06:03), Max: 98.7 (17 Oct 2019 15:32)  HR: 58 (18 Oct 2019 06:03) (57 - 64)  BP: 124/50 (18 Oct 2019 06:03) (124/50 - 147/55)  BP(mean): --  RR: 15 (18 Oct 2019 06:03) (15 - 15)  SpO2: 100% (18 Oct 2019 06:03) (100% - 100%)            10-17 @ 07:01  -  10-18 @ 07:00  --------------------------------------------------------  IN: 1900 mL / OUT: 2600 mL / NET: -700 mL          LABS:        TPro  7.2  /  Alb  x   /  TBili  x   /  DBili  x   /  AST  x   /  ALT  x   /  AlkPhos  x   10-18          CAPILLARY BLOOD GLUCOSE                SARAH -- 10-17 @ 06:50  Anti RBP -- 10-17 @ 06:50  Anti SS-A -- 10-17 @ 06:50  Anti SS-B -- 10-17 @ 06:50  RF <10 10-17 @ 06:50    Atypical ANCA -- 10-17 @ 06:50  c-ANCA titer -- 10-17 @ 06:50  c-ANCA -- 10-17 @ 06:50  p-ANCA -- 10-17 @ 06:50              CULTURES: (if applicable)                    Physical Examination:    General: No acute distress.      HEENT: Pupils equal, reactive to light.  Symmetric.    PULM: Clear to auscultation bilaterally, no significant sputum production.  upper > lower lip edema, acute onset since 11am today.  no wheeze, stridor, drooling, cough, respiratory distress or complaint.  tongue not swollen, pharynx visibly inspected with good room.  no difficulty speaking, swallowing.    CVS: S1, S2    ABD: Soft, nondistended, nontender, normoactive bowel sounds, no masses    EXT: R knee effusion, tender    SKIN: Warm and well perfused, no rashes noted.    NEURO: Alert, oriented, paranoid.      RADIOLOGY REVIEWED  CXR:    CT chest:    TTE: ICU CONSULT    Initial HPI on admission:  HPI:  82 year old  female with HTN, CAD, DM2, RA, Obesity presents from home for difficulty to  ambulate on 10/14.  Patient stated she has been having worsening right knee pain on lateral side for multiple days.   Was admitted initially to Medical floor for management of knee pain.    Today: Patient was seen having increasing upper > Lower lip swelling. ICU consult called for allergic reaction vs angio edema.    On evaluation of patient, patient is poor historian and appears to be paranoid. Suggesting that medical team gave her something to cause lip swelling just because she wants to go home.    extensive discussion with patient and medical team had, and ultimately will need transfer to ICU for closer monitoring.     PAST MEDICAL & SURGICAL HISTORY:  HLD (hyperlipidemia)  Thyroid disease  Pulmonary embolism  Hypertension  DM (diabetes mellitus)  MI (myocardial infarction)  CAD (coronary artery disease)  Obesity  Rheumatoid arthritis  History of hysterectomy    Allergies    sulfa drugs (Rash)    Intolerances      FAMILY HISTORY:  No pertinent family history in first degree relatives    Social history:     Review of Systems: Limited.   CONSTITUTIONAL: No fever, chills, or fatigue  EYES: No eye pain, visual disturbances, or discharge  ENMT:  No difficulty hearing, tinnitus, vertigo; No sinus or throat pain  NECK: No pain or stiffness  RESPIRATORY: Per above  CARDIOVASCULAR: No chest pain, palpitations, dizziness, or leg swelling  GASTROINTESTINAL: No abdominal or epigastric pain. No nausea, vomiting, or hematemesis; No diarrhea or constipation. No melena or hematochezia.  GENITOURINARY: No dysuria, frequency, hematuria, or incontinence  NEUROLOGICAL: No headaches, memory loss, loss of strength, numbness, or tremors  SKIN: No itching, burning, rashes, or lesions   MUSCULOSKELETAL: No joint pain or swelling; No muscle, back, or extremity pain  PSYCHIATRIC: No depression, anxiety, mood swings, or difficulty sleeping      Medications:  MEDICATIONS  (STANDING):  aspirin enteric coated 81 milliGRAM(s) Oral daily  atorvastatin 40 milliGRAM(s) Oral at bedtime  clopidogrel Tablet 75 milliGRAM(s) Oral daily  colchicine 0.6 milliGRAM(s) Oral daily  dextrose 5%. 1000 milliLiter(s) (50 mL/Hr) IV Continuous <Continuous>  dextrose 50% Injectable 12.5 Gram(s) IV Push once  dextrose 50% Injectable 25 Gram(s) IV Push once  dextrose 50% Injectable 25 Gram(s) IV Push once  diphenhydrAMINE   Injectable 50 milliGRAM(s) IV Push once  diphenhydrAMINE   Injectable 50 milliGRAM(s) IV Push every 6 hours  docusate sodium 100 milliGRAM(s) Oral three times a day  famotidine  IVPB 20 milliGRAM(s) IV Intermittent two times a day  glucagon  Injectable 3 milliGRAM(s) IV Push once  heparin  Injectable 5000 Unit(s) SubCutaneous every 8 hours  lidocaine   Patch 1 Patch Transdermal daily  methylPREDNISolone sodium succinate Injectable 125 milliGRAM(s) IV Push once  methylPREDNISolone sodium succinate Injectable 40 milliGRAM(s) IV Push every 8 hours  metoprolol tartrate 12.5 milliGRAM(s) Oral two times a day    MEDICATIONS  (PRN):  acetaminophen   Tablet .. 650 milliGRAM(s) Oral every 6 hours PRN Mild Pain (1 - 3)  dextrose 40% Gel 15 Gram(s) Oral once PRN Blood Glucose LESS THAN 70 milliGRAM(s)/deciliter  glucagon  Injectable 1 milliGRAM(s) IntraMuscular once PRN Glucose LESS THAN 70 milligrams/deciliter  oxyCODONE    5 mG/acetaminophen 325 mG 1 Tablet(s) Oral every 6 hours PRN Moderate Pain (4 - 6)  senna 2 Tablet(s) Oral at bedtime PRN Constipation            Vital Signs Last 24 Hrs  T(C): 36.8 (18 Oct 2019 06:03), Max: 37.1 (17 Oct 2019 15:32)  T(F): 98.3 (18 Oct 2019 06:03), Max: 98.7 (17 Oct 2019 15:32)  HR: 58 (18 Oct 2019 06:03) (57 - 64)  BP: 124/50 (18 Oct 2019 06:03) (124/50 - 147/55)  BP(mean): --  RR: 15 (18 Oct 2019 06:03) (15 - 15)  SpO2: 100% (18 Oct 2019 06:03) (100% - 100%)            10-17 @ 07:01  -  10-18 @ 07:00  --------------------------------------------------------  IN: 1900 mL / OUT: 2600 mL / NET: -700 mL          LABS:        TPro  7.2  /  Alb  x   /  TBili  x   /  DBili  x   /  AST  x   /  ALT  x   /  AlkPhos  x   10-18          CAPILLARY BLOOD GLUCOSE                SARAH -- 10-17 @ 06:50  Anti RBP -- 10-17 @ 06:50  Anti SS-A -- 10-17 @ 06:50  Anti SS-B -- 10-17 @ 06:50  RF <10 10-17 @ 06:50    Atypical ANCA -- 10-17 @ 06:50  c-ANCA titer -- 10-17 @ 06:50  c-ANCA -- 10-17 @ 06:50  p-ANCA -- 10-17 @ 06:50          Physical Examination:    General: No acute distress.      HEENT: Upper and lower lip swelling, no tongue swelling, no stridor, no difficulty speaking, swallowing noted.     PULM: Clear to auscultation bilaterally, no significant sputum production.  upper > lower lip edema, acute onset since 11am today.  no wheeze, stridor, drooling, cough, respiratory distress or complaint.  tongue not swollen, pharynx visibly inspected with good room.  no difficulty speaking, swallowing.    CVS: S1, S2    ABD: Soft, nondistended, nontender, normoactive bowel sounds, no masses    EXT: R knee effusion, tender    SKIN: Warm and well perfused, no rashes noted.    NEURO: Alert, oriented, paranoid.

## 2019-10-18 NOTE — CHART NOTE - NSCHARTNOTEFT_GEN_A_CORE
unchanged clinical status.  refusing iv access and iv meds.  also now refusing all oral meds stating that she takes enough meds already.

## 2019-10-18 NOTE — PROGRESS NOTE ADULT - ATTENDING COMMENTS
I have personally seen and examined patient on the above date.  I discussed the case with BONNIE Santana and I agree with findings and plan as detailed per note above, which I have amended where appropriate.      Patient with lip swelling may be secondary to acei vs other causes such as food- icu consulted and patient accepted to ICU for monitoring; would give iv steroids, benadryl, glucagon. further care per ICU    Critical Care Time spent on patient 55 minutes I have personally seen and examined patient on the above date.  I discussed the case with BONNIE Santana and I agree with findings and plan as detailed per note above, which I have amended where appropriate.      Patient with lip swelling may be secondary to acei vs other causes such as food- icu consulted and patient accepted to ICU for monitoring; would give iv steroids, benadryl, glucagon. further care per ICU    patient w/ psychosis as well will consult psychiatry as d/w Critical care-> spoke to Dr Pineda Haywood will see patient     Critical Care Time spent on patient 55 minutes

## 2019-10-18 NOTE — CONSULT NOTE ADULT - ATTENDING COMMENTS
Pt seen and examined    Extensive discussion had with pt , medical staff  Psyc was contacted     Assessment   Lip swelling either anaphylaxis (food/Medicine) vs angioedema  Psychosis / Paranoia   Initial complaint of knee pain (she is not complaining at this time)  HTN, CAD, DM2, RA, Obesity    Plan  admit to icu for overnight  Steroids, pepcid, benadryl  Psyc evaluation called  Monitor airway Pt seen and examined    Extensive discussion had with pt , medical staff  Psyc was contacted     Assessment   Lip swelling either anaphylaxis (food/Medicine) vs angioedema  Psychosis / Paranoia   Initial complaint of knee pain (she is not complaining at this time)  HTN, CAD, DM2, RA, Obesity    Plan  admit to icu for overnight  Steroids, pepcid, benadryl  Psyc evaluation called  Monitor airway  hold ace, cholerine at this time

## 2019-10-19 LAB
CCP IGG SERPL-ACNC: 66 UNITS — HIGH (ref 0–19)
GLUCOSE BLDC GLUCOMTR-MCNC: 128 MG/DL — HIGH (ref 70–99)
GLUCOSE BLDC GLUCOMTR-MCNC: 173 MG/DL — HIGH (ref 70–99)
RF+CCP IGG SER-IMP: ABNORMAL

## 2019-10-19 NOTE — PROVIDER CONTACT NOTE (MEDICATION) - ASSESSMENT
pt Ax0x4. noted bottom right side of lip mild edema. significant decrease in upper lip and other areas. no evidence of airway impairment. pt appears paranoid and assumes all staff is making her sick.

## 2019-10-19 NOTE — PROGRESS NOTE ADULT - SUBJECTIVE AND OBJECTIVE BOX
Follow-up Critical Care Progress Note  Chief Complaint : Difficulty in walking, not elsewhere classified      pt feels well, no complaints  remains paranoid today, refusing oral meds, steroids, benadryl, bp meds  psyc evaluating patient  no problems swallowing, no problems eating, no problems eating (but refusing)      Compared to yesterday upper and lower lip swelling has resolved, but only small swelling of lower lip remains.       Allergies :Berries (Angioedema (Mild to Mod))  sulfa drugs (Rash)      PAST MEDICAL & SURGICAL HISTORY:  HLD (hyperlipidemia)  Thyroid disease  Pulmonary embolism  Hypertension  DM (diabetes mellitus)  MI (myocardial infarction)  CAD (coronary artery disease)  Obesity  Rheumatoid arthritis  History of hysterectomy      Medications:  MEDICATIONS  (STANDING):  aspirin enteric coated 81 milliGRAM(s) Oral daily  atorvastatin 40 milliGRAM(s) Oral at bedtime  clopidogrel Tablet 75 milliGRAM(s) Oral daily  dextrose 5%. 1000 milliLiter(s) (50 mL/Hr) IV Continuous <Continuous>  dextrose 50% Injectable 12.5 Gram(s) IV Push once  dextrose 50% Injectable 25 Gram(s) IV Push once  dextrose 50% Injectable 25 Gram(s) IV Push once  diphenhydrAMINE 25 milliGRAM(s) Oral every 6 hours  docusate sodium 100 milliGRAM(s) Oral three times a day  famotidine    Tablet 20 milliGRAM(s) Oral two times a day  famotidine  IVPB 20 milliGRAM(s) IV Intermittent two times a day  glucagon  Injectable 3 milliGRAM(s) IV Push once  heparin  Injectable 5000 Unit(s) SubCutaneous every 8 hours  insulin lispro (HumaLOG) corrective regimen sliding scale   SubCutaneous every 6 hours  lidocaine   Patch 1 Patch Transdermal daily  methylPREDNISolone sodium succinate Injectable 125 milliGRAM(s) IV Push once  methylPREDNISolone sodium succinate Injectable 40 milliGRAM(s) IV Push every 8 hours  metoprolol tartrate 12.5 milliGRAM(s) Oral two times a day    MEDICATIONS  (PRN):  acetaminophen   Tablet .. 650 milliGRAM(s) Oral every 6 hours PRN Mild Pain (1 - 3)  dextrose 40% Gel 15 Gram(s) Oral once PRN Blood Glucose LESS THAN 70 milliGRAM(s)/deciliter  glucagon  Injectable 1 milliGRAM(s) IntraMuscular once PRN Glucose LESS THAN 70 milligrams/deciliter  metoprolol tartrate Injectable 5 milliGRAM(s) IV Push every 6 hours PRN sbp over 160  oxyCODONE    5 mG/acetaminophen 325 mG 1 Tablet(s) Oral every 6 hours PRN Moderate Pain (4 - 6)  senna 2 Tablet(s) Oral at bedtime PRN Constipation      LABS:  refusing labs, meds    VITALS:  T(C): 36.8 (10-19-19 @ 07:49), Max: 36.8 (10-18-19 @ 15:42)  T(F): 98.3 (10-19-19 @ 07:49), Max: 98.3 (10-19-19 @ 07:49)  HR: 65 (10-19-19 @ 07:49) (57 - 100)  BP: 108/58 (10-19-19 @ 07:49) (91/48 - 118/90)  BP(mean): 70 (10-19-19 @ 07:49) (61 - 101)  ABP: --  ABP(mean): --  RR: 16 (10-18-19 @ 20:00) (11 - 20)  SpO2: 95% (10-19-19 @ 07:49) (95% - 100%)  CVP(mm Hg): --  CVP(cm H2O): --    Ins and Outs

## 2019-10-19 NOTE — PROGRESS NOTE ADULT - SUBJECTIVE AND OBJECTIVE BOX
Psychiatric Consult Note f/u    Identifying Data: 82yo  Domiciled, Retired, AA Female.     History of Present Illness:  83 y/o female with HTN, CAD, DM, RA, Obesity presents from home for unable to ambulate.  Patient states she has been having worsening right knee pain on lateral side for multiple days.  She states she lives alone and unable to ambulate.  She states she does have a history of RA and Gout.   Patient has NOT followed up with her primary care physician because Dr. Ivory no longer sees patient.   Denies trauma to knee.     Patient was seen approximately 1 year ago for similar, found to have a gout flare/RA flare.   Patient has NOT been taking her medications for several months. (14 Oct 2019 16:47)      Chief Complaint/Reason for Consult: " They are playing games, I didn't ask to come over here."     Health Issues: Difficulty in walking  HLD (hyperlipidemia)  Thyroid disease  Pulmonary embolism  Hypertension  Hyperthyroidism  DM (diabetes mellitus)  MI (myocardial infarction)  CAD (coronary artery disease)  Obesity  Rheumatoid arthritis  CKD (chronic kidney disease)  Lip swelling  History of hysterectomy  LEG PAIN  Arthritis of knee    Psychiatric Review of Systems:  Patient seen  due to paranoid ideation, refusal of treatment .  Pt seen in ICU ,She is c/o her upper  lip  swelled . Pt is not sure what is occurring but she is perseverative about being transferred from [address] [place] [address] on to the ICU. " My name was on the board on the other unit."  She states she is not sick and does not need this level of monitoring, implies that the hospital is also doing this to gain money . She also talks about  another patient on [address] [place] [address] that she knows. Her now  boyfriend used to care give for this patient, and she somehow feels his where abouts were deliberately kept from her and in some way involves her current plight and the fact that her  boyfriends children "don't like me".  She reports she has people visiting from [place] and she needs to go home to let them in as she has the keys. She is still suspicious as she feels the doctors involved in her care "look like foreigners".     Current medications: acetaminophen   Tablet .. 650 milliGRAM(s) Oral every 6 hours PRN  aspirin enteric coated 81 milliGRAM(s) Oral daily  atorvastatin 40 milliGRAM(s) Oral at bedtime  clopidogrel Tablet 75 milliGRAM(s) Oral daily  dextrose 40% Gel 15 Gram(s) Oral once PRN  dextrose 5%. 1000 milliLiter(s) IV Continuous <Continuous>  dextrose 50% Injectable 12.5 Gram(s) IV Push once  dextrose 50% Injectable 25 Gram(s) IV Push once  dextrose 50% Injectable 25 Gram(s) IV Push once  diphenhydrAMINE 25 milliGRAM(s) Oral every 6 hours  diphenhydrAMINE   Injectable 50 milliGRAM(s) IV Push every 6 hours  diphenhydrAMINE   Injectable 50 milliGRAM(s) IV Push once  docusate sodium 100 milliGRAM(s) Oral three times a day  famotidine    Tablet 20 milliGRAM(s) Oral two times a day  famotidine  IVPB 20 milliGRAM(s) IV Intermittent two times a day  glucagon  Injectable 3 milliGRAM(s) IV Push once  glucagon  Injectable 1 milliGRAM(s) IntraMuscular once PRN  heparin  Injectable 5000 Unit(s) SubCutaneous every 8 hours  insulin lispro (HumaLOG) corrective regimen sliding scale   SubCutaneous every 6 hours  lidocaine   Patch 1 Patch Transdermal daily  methylPREDNISolone sodium succinate Injectable 125 milliGRAM(s) IV Push once  methylPREDNISolone sodium succinate Injectable 40 milliGRAM(s) IV Push every 8 hours  metoprolol tartrate 12.5 milliGRAM(s) Oral two times a day  metoprolol tartrate Injectable 5 milliGRAM(s) IV Push every 6 hours PRN  oxyCODONE    5 mG/acetaminophen 325 mG 1 Tablet(s) Oral every 6 hours PRN  predniSONE   Tablet 60 milliGRAM(s) Oral once  senna 2 Tablet(s) Oral at bedtime PRN      Labs:    Sed rate 120    TPro  7.2  /  Alb  3.2<L>  /  TBili  x   /  DBili  x   /  AST  x   /  ALT  x   /  AlkPhos  x   10-    Vital Signs Last 24 hours: T(C): 36.8 (10-18-19 @ 15:42), Max: 37 (10-17-19 @ 21:55)  HR: 73 (10-18-19 @ 18:00) (57 - 73)  BP: 91/62 (10-18-19 @ 18:00) (91/48 - 147/55)  RR: 11 (10-18-19 @ 18:00) (11 - 20)  SpO2: 99% (10-18-19 @ 18:00) (99% - 100%)    Allergies: Berries (Angioedema (Mild to Mod))  sulfa drugs (Rash)      Past Psychiatric and Psychosocial and  Substance use History: As per her son Andrea, no past psychiatric history, although he states her memory and   concentration are increasingly poor. He also states she at times has forgotten his phone number. 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   Pt did have a significant other - Mr. Araujo who  recently of cancer.  She does know a particular patient [address] who is currently hospitalized.   Pt is  and resides alone.  Per son pt will drink a beer on occasion, but does not have a substance use history.       Current Mental Status Exam:  Appearance:- overweight AA female,   Attitude: - cooperative ")  Gait/Station: -  bedrest.   Motor Activity: - + psychomotor restlessness.   Affect: - tense  Mood: -irritable.   Speech: -has a lisp due to the swelling of her upper lip, difficult to understand, speaks about people as if writer were acquainted with them.  Thought process: -illogical.   Thought content/perceptions: confused, see HPI for details.   Hallucinations: not present.  Delusions: systematized persecutory and referential delusions about her  significant other's children.   Suicidal ideation: denied  Homicidal ideation:   denied  Sensorium: alert,  Orientation: x 3  Attention: impaired  Concentration: impaired  Memory: unable to assess due to level of perseveration, unable to move patient from topic.   Insight/Judgment: poor. Does not understand or appreciate need for monitoring of allergic reaction has psychotic interpretation of real events.     Assessment and Plan: Psychosis NOS   r/o delirium, r/o reaction to steroids and or pain medications.      Follow up status: Will follow with you.     Other recommendations: seek substitutive judgement regarding decision making capacity.   Can give Zyprexa 5 mg q 6 prn agitation, max dose 20 mg in 24 hour period.     Level of observation:  may need enhanced supervision or if AWOL risk- 1:1.   Pt to have abnormality in bone followed up as an outpatient, will need to involve her son in her care post discharge, case   coordinator should reach out to son regarding safe d/c planning.

## 2019-10-20 DIAGNOSIS — F23 BRIEF PSYCHOTIC DISORDER: ICD-10-CM

## 2019-10-20 PROCEDURE — 99232 SBSQ HOSP IP/OBS MODERATE 35: CPT

## 2019-10-20 RX ORDER — OLANZAPINE 15 MG/1
5 TABLET, FILM COATED ORAL EVERY 6 HOURS
Refills: 0 | Status: DISCONTINUED | OUTPATIENT
Start: 2019-10-20 | End: 2019-10-25

## 2019-10-20 NOTE — PROGRESS NOTE ADULT - ASSESSMENT
Physical Examination:  GENERAL:               Alert, Oriented x3 , No acute distress.    HEENT:                   mild Right Lower lip swelling present , improved from yesterday, no stridor, no problems speaking  PULM:                     Bilateral air entry, Clear to auscultation bilaterally, no significant sputum production, No Rales, No Rhonchi, No Wheezing  CVS:                         S1, S2,  No Murmur  ABD:                        Soft, nondistended, nontender, normoactive bowel sounds,   Vascular:                Warm Extremities,  NEURO:                  Alert, oriented, interactive, nonfocal, follows commands  PSYC:                      Paranoid ,  no  Insight.        Assessment   Lip swelling either anaphylaxis (food - Strawberries Blackberries, cranberry /Medicine -Cholchicine) vs angioedema(Ace)   Psychosis / Paranoia   Initial complaint of knee pain (she is not complaining at this time)  HTN, CAD, DM2, RA, Obesity    Plan  Continue care on gail  Swelling continues to improve despite patient refusing Steroids, pepcid, benadryl  Psyc f/u  hold ace, cholerine at this time.   case d/w Dr. Maxwell  Management of RA / knee pain as per primary team.

## 2019-10-20 NOTE — PROGRESS NOTE ADULT - SUBJECTIVE AND OBJECTIVE BOX
Follow-up Critical Care Progress Note  Chief Complaint : Difficulty in walking, not elsewhere classified      pt continues to refuse meds  lip swelling improved no stridor  no problems speaking, eating or swallowing noted    comfortable      Allergies :Berries (Angioedema (Mild to Mod))  sulfa drugs (Rash)      PAST MEDICAL & SURGICAL HISTORY:  HLD (hyperlipidemia)  Thyroid disease  Pulmonary embolism  Hypertension  DM (diabetes mellitus)  MI (myocardial infarction)  CAD (coronary artery disease)  Obesity  Rheumatoid arthritis  History of hysterectomy      Medications:  MEDICATIONS  (STANDING):  aspirin enteric coated 81 milliGRAM(s) Oral daily  atorvastatin 40 milliGRAM(s) Oral at bedtime  clopidogrel Tablet 75 milliGRAM(s) Oral daily  dextrose 5%. 1000 milliLiter(s) (50 mL/Hr) IV Continuous <Continuous>  dextrose 50% Injectable 12.5 Gram(s) IV Push once  dextrose 50% Injectable 25 Gram(s) IV Push once  dextrose 50% Injectable 25 Gram(s) IV Push once  docusate sodium 100 milliGRAM(s) Oral three times a day  heparin  Injectable 5000 Unit(s) SubCutaneous every 8 hours  metoprolol tartrate 12.5 milliGRAM(s) Oral two times a day    MEDICATIONS  (PRN):  acetaminophen   Tablet .. 650 milliGRAM(s) Oral every 6 hours PRN Mild Pain (1 - 3)  dextrose 40% Gel 15 Gram(s) Oral once PRN Blood Glucose LESS THAN 70 milliGRAM(s)/deciliter  glucagon  Injectable 1 milliGRAM(s) IntraMuscular once PRN Glucose LESS THAN 70 milligrams/deciliter  OLANZapine Injectable 5 milliGRAM(s) IntraMuscular every 6 hours PRN Agitation  oxyCODONE    5 mG/acetaminophen 325 mG 1 Tablet(s) Oral every 6 hours PRN Moderate Pain (4 - 6)  senna 2 Tablet(s) Oral at bedtime PRN Constipation      VITALS:  T(C): 36.9 (10-20-19 @ 05:59), Max: 37.2 (10-19-19 @ 21:18)  T(F): 98.5 (10-20-19 @ 05:59), Max: 99 (10-19-19 @ 21:18)  HR: 70 (10-20-19 @ 05:59) (64 - 71)  BP: 110/57 (10-20-19 @ 05:59) (104/66 - 119/72)  BP(mean): --  ABP: --  ABP(mean): --  RR: 15 (10-20-19 @ 05:59) (15 - 16)  SpO2: 99% (10-20-19 @ 05:59) (99% - 99%)  CVP(mm Hg): --  CVP(cm H2O): --    Ins and Outs     10-19-19 @ 07:01  -  10-20-19 @ 07:00  --------------------------------------------------------  IN: 280 mL / OUT: 0 mL / NET: 280 mL                I&O's Detail    19 Oct 2019 07:01  -  20 Oct 2019 07:00  --------------------------------------------------------  IN:    Oral Fluid: 280 mL  Total IN: 280 mL    OUT:  Total OUT: 0 mL    Total NET: 280 mL

## 2019-10-20 NOTE — PROGRESS NOTE ADULT - SUBJECTIVE AND OBJECTIVE BOX
Patient is a 83y old  Female who presents with a chief complaint of Knee pain/unable to ambulate (19 Oct 2019 13:26)      Interval HPI/ Overnight events: No events overnight    Patient seen and examined at bedside, wants to go home because she does not want to lose her apartment.     REVIEW OF SYSTEMS:    CONSTITUTIONAL: No weakness, fevers or chills  EYES/ENT: No visual changes;  No vertigo or throat pain   NECK: No pain or stiffness  RESPIRATORY: No cough, wheezing, hemoptysis; No shortness of breath  CARDIOVASCULAR: No chest pain or palpitations  GASTROINTESTINAL: No abdominal or epigastric pain. No nausea, vomiting, or hematemesis; No diarrhea or constipation. No melena or hematochezia.  GENITOURINARY: No dysuria, frequency or hematuria  NEUROLOGICAL: No numbness or weakness  MSK: right knee pain  All other review of systems is negative unless indicated above.      ALLERGIES:  Berries (Angioedema (Mild to Mod))  sulfa drugs (Rash)    MEDICATIONS  (STANDING):  aspirin enteric coated 81 milliGRAM(s) Oral daily  atorvastatin 40 milliGRAM(s) Oral at bedtime  clopidogrel Tablet 75 milliGRAM(s) Oral daily  dextrose 5%. 1000 milliLiter(s) (50 mL/Hr) IV Continuous <Continuous>  dextrose 50% Injectable 12.5 Gram(s) IV Push once  dextrose 50% Injectable 25 Gram(s) IV Push once  dextrose 50% Injectable 25 Gram(s) IV Push once  docusate sodium 100 milliGRAM(s) Oral three times a day  heparin  Injectable 5000 Unit(s) SubCutaneous every 8 hours  metoprolol tartrate 12.5 milliGRAM(s) Oral two times a day    MEDICATIONS  (PRN):  acetaminophen   Tablet .. 650 milliGRAM(s) Oral every 6 hours PRN Mild Pain (1 - 3)  dextrose 40% Gel 15 Gram(s) Oral once PRN Blood Glucose LESS THAN 70 milliGRAM(s)/deciliter  glucagon  Injectable 1 milliGRAM(s) IntraMuscular once PRN Glucose LESS THAN 70 milligrams/deciliter  oxyCODONE    5 mG/acetaminophen 325 mG 1 Tablet(s) Oral every 6 hours PRN Moderate Pain (4 - 6)  senna 2 Tablet(s) Oral at bedtime PRN Constipation    Vital Signs Last 24 Hrs  T(F): 98.5 (20 Oct 2019 05:59), Max: 99 (19 Oct 2019 21:18)  HR: 70 (20 Oct 2019 05:59) (64 - 71)  BP: 110/57 (20 Oct 2019 05:59) (104/66 - 119/72)  RR: 15 (20 Oct 2019 05:59) (15 - 16)  SpO2: 99% (20 Oct 2019 05:59) (99% - 99%)  I&O's Summary    19 Oct 2019 07:01  -  20 Oct 2019 07:00  --------------------------------------------------------  IN: 280 mL / OUT: 0 mL / NET: 280 mL        PHYSICAL EXAM:  General: NAD, well dressed, well nourished  Head: NT/AC  ENT: MMM, no oropharyngeal erythema or exudates. Mild right lower lip swelling  Neck: Supple, No JVD  Lungs: Clear to auscultation bilaterally, no wheezing, rales, or rhonchi, no accessory muscle use. No stridor  Cardio: RRR, normal S1/S2, No M/R/G  Abdomen: Normoactive BS, Abdomen soft, nontender, nondistended; no organomegaly  Extremities: No calf tenderness, no clubbing or  cyanosis. Swelling of right knee including posterior compartment, mildly tender to touch. No warmth or erythema  Vascular: no LE edema  Skin: warm and dry  Psych: still has paranoia - concerns about medications and does not want to take them, concern of losing her apartment because hospital staff wants to send her to rehab    LABS:        TPro  7.2  /  Alb  3.2  /  TBili  x   /  DBili  x   /  AST  x   /  ALT  x   /  AlkPhos  x   10-18                            POCT Blood Glucose.: 128 mg/dL (19 Oct 2019 21:12)    10-15 [id number] 6.1          RADIOLOGY & ADDITIONAL TESTS:    Care Discussed with Consultants/Other Providers:

## 2019-10-20 NOTE — PROGRESS NOTE ADULT - SUBJECTIVE AND OBJECTIVE BOX
Psychiatric Consult Note f/u    Identifying Data: 84yo  Domiciled, Retired, AA Female.     History of Present Illness:  81 y/o female with HTN, CAD, DM, RA, Obesity presents from home for unable to ambulate.  Patient states she has been having worsening right knee pain on lateral side for multiple days.  She states she lives alone and unable to ambulate.  She states she does have a history of RA and Gout.   Patient has NOT followed up with her primary care physician because Dr. Ivory no longer sees patient.   Denies trauma to knee.     Patient was seen approximately 1 year ago for similar, found to have a gout flare/RA flare.   Patient has NOT been taking her medications for several months. (14 Oct 2019 16:47)      Chief Complaint/Reason for Consult: " They are playing games, I didn't ask to come over here."     Health Issues: Difficulty in walking  HLD (hyperlipidemia)  Thyroid disease  Pulmonary embolism  Hypertension  Hyperthyroidism  DM (diabetes mellitus)  MI (myocardial infarction)  CAD (coronary artery disease)  Obesity  Rheumatoid arthritis  CKD (chronic kidney disease)  Lip swelling  History of hysterectomy  LEG PAIN  Arthritis of knee    Psychiatric Review of Systems:  Patient seen  due to  refusal of treatment .She slept  well at night, keeps refusing  medications and requests to let her go home, She said ,she is not taking medications because  they are not the same ,as Dr Ivory was giving  her. She can not  say what  medications she was taking and how she will be at home with her knee  problem    Current medications: acetaminophen   Tablet .. 650 milliGRAM(s) Oral every 6 hours PRN  aspirin enteric coated 81 milliGRAM(s) Oral daily  atorvastatin 40 milliGRAM(s) Oral at bedtime  clopidogrel Tablet 75 milliGRAM(s) Oral daily  dextrose 40% Gel 15 Gram(s) Oral once PRN  dextrose 5%. 1000 milliLiter(s) IV Continuous <Continuous>  dextrose 50% Injectable 12.5 Gram(s) IV Push once  dextrose 50% Injectable 25 Gram(s) IV Push once  dextrose 50% Injectable 25 Gram(s) IV Push once  diphenhydrAMINE 25 milliGRAM(s) Oral every 6 hours  diphenhydrAMINE   Injectable 50 milliGRAM(s) IV Push every 6 hours  diphenhydrAMINE   Injectable 50 milliGRAM(s) IV Push once  docusate sodium 100 milliGRAM(s) Oral three times a day  famotidine    Tablet 20 milliGRAM(s) Oral two times a day  famotidine  IVPB 20 milliGRAM(s) IV Intermittent two times a day  glucagon  Injectable 3 milliGRAM(s) IV Push once  glucagon  Injectable 1 milliGRAM(s) IntraMuscular once PRN  heparin  Injectable 5000 Unit(s) SubCutaneous every 8 hours  insulin lispro (HumaLOG) corrective regimen sliding scale   SubCutaneous every 6 hours  lidocaine   Patch 1 Patch Transdermal daily  methylPREDNISolone sodium succinate Injectable 125 milliGRAM(s) IV Push once  methylPREDNISolone sodium succinate Injectable 40 milliGRAM(s) IV Push every 8 hours  metoprolol tartrate 12.5 milliGRAM(s) Oral two times a day  metoprolol tartrate Injectable 5 milliGRAM(s) IV Push every 6 hours PRN  oxyCODONE    5 mG/acetaminophen 325 mG 1 Tablet(s) Oral every 6 hours PRN  predniSONE   Tablet 60 milliGRAM(s) Oral once  senna 2 Tablet(s) Oral at bedtime PRN      Labs:    Sed rate 120    TPro  7.2  /  Alb  3.2<L>  /  TBili  x   /  DBili  x   /  AST  x   /  ALT  x   /  AlkPhos  x   10-18    Vital Signs Last 24 hours: T(C): 36.8 (10-18-19 @ 15:42), Max: 37 (10-17-19 @ 21:55)  HR: 73 (10-18-19 @ 18:00) (57 - 73)  BP: 91/62 (10-18-19 @ 18:00) (91/48 - 147/55)  RR: 11 (10-18-19 @ 18:00) (11 - 20)  SpO2: 99% (10-18-19 @ 18:00) (99% - 100%)    Allergies: Berries (Angioedema (Mild to Mod))  sulfa drugs (Rash)      Current Mental Status Exam:  Appearance:- overweight AA female,   Attitude: - marginally  cooperative "  Gait/Station: -  bedrest.   Motor Activity: - + psychomotor restlessness.   Affect: - tense  Mood: -irritable.   Speech: -has a lisp due to the swelling of her upper lip, difficult to understand,  Thought process: -illogical.   Thought content/perceptions: confused, see HPI for details.   Hallucinations: not present.  Delusions: systematized persecutory and referential delusions about her  significant other's children.   Suicidal ideation: denied  Homicidal ideation:   denied  Sensorium: alert,  Orientation: x 3  Attention: impaired  Concentration: impaired  Memory: unable to assess due to level of perseveration, unable to move patient from topic.   Insight/Judgment: poor. Does not understand or appreciate need for monitoring of allergic reaction has psychotic interpretation of real events.     Assessment and Plan: Psychosis NOS   r/o delirium, r/o reaction to steroids and or pain medications.      Follow up status: Will follow with you.     Other recommendations: seek substitutive judgement regarding decision making capacity.   Can give Zyprexa 5 mg q 6 prn agitation, max dose 20 mg in 24 hour period.     Level of observation:  may need enhanced supervision or if AWOL risk- 1:1.   Pt to have abnormality in bone followed up as an outpatient, will need to involve her son in her care post discharge, case   coordinator should reach out to son regarding safe d/c planning.

## 2019-10-21 DIAGNOSIS — Z29.9 ENCOUNTER FOR PROPHYLACTIC MEASURES, UNSPECIFIED: ICD-10-CM

## 2019-10-21 DIAGNOSIS — F29 UNSPECIFIED PSYCHOSIS NOT DUE TO A SUBSTANCE OR KNOWN PHYSIOLOGICAL CONDITION: ICD-10-CM

## 2019-10-21 LAB
GLUCOSE BLDC GLUCOMTR-MCNC: 152 MG/DL — HIGH (ref 70–99)
T PALLIDUM AB TITR SER: NEGATIVE — SIGNIFICANT CHANGE UP
T4 FREE SERPL-MCNC: 1 NG/DL — SIGNIFICANT CHANGE UP (ref 0.9–1.8)
TSH SERPL-MCNC: 2.21 UIU/ML — SIGNIFICANT CHANGE UP (ref 0.27–4.2)

## 2019-10-21 PROCEDURE — 99232 SBSQ HOSP IP/OBS MODERATE 35: CPT

## 2019-10-21 RX ORDER — HEPARIN SODIUM 5000 [USP'U]/ML
5000 INJECTION INTRAVENOUS; SUBCUTANEOUS EVERY 8 HOURS
Refills: 0 | Status: DISCONTINUED | OUTPATIENT
Start: 2019-10-21 | End: 2019-10-25

## 2019-10-21 RX ADMIN — OXYCODONE AND ACETAMINOPHEN 1 TABLET(S): 5; 325 TABLET ORAL at 08:25

## 2019-10-21 RX ADMIN — SENNA PLUS 2 TABLET(S): 8.6 TABLET ORAL at 07:54

## 2019-10-21 RX ADMIN — OXYCODONE AND ACETAMINOPHEN 1 TABLET(S): 5; 325 TABLET ORAL at 07:54

## 2019-10-21 NOTE — PROGRESS NOTE ADULT - ASSESSMENT
82 yo female with HTN, HLD, CAD, Type 2 DM, RA, CKD stage 3, Obesity presents from home for worsening right knee pain, swelling and inability to ambulate, now with oral lip swelling

## 2019-10-21 NOTE — PROGRESS NOTE ADULT - ASSESSMENT
Physical Examination:  GENERAL:               Alert, Oriented x3 , No acute distress.    HEENT:                   mild Right Lower lip swelling present , improved from yesterday, no stridor, no problems speaking  PULM:                     Bilateral air entry, Clear to auscultation bilaterally, no significant sputum production, No Rales, No Rhonchi, No Wheezing  CVS:                         S1, S2,  No Murmur  NEURO:                  Alert, oriented, interactive, nonfocal, follows commands  PSYC:                      Paranoid ,  no  Insight.        Assessment   Lip swelling either anaphylaxis (food - Strawberries Blackberries, cranberry /Medicine -Cholchicine) vs angioedema(Ace)   Psychosis / Paranoia   Initial complaint of knee pain (she is not complaining at this time)  HTN, CAD, DM2, RA, Obesity    Plan  Continue care on gail  Swelling continues to improve despite patient refusing Steroids, pepcid, benadryl  Psyc f/u  hold ace, cholerine at this time.   case d/w Dr. Maxwell  Management of RA / knee pain as per primary team. Physical Examination:  GENERAL:               Alert, Oriented x3 , No acute distress.    HEENT:                   mild Right Lower lip swelling present , improved from yesterday, no stridor, no problems speaking  PULM:                     Bilateral air entry, Clear to auscultation bilaterally, no significant sputum production, No Rales, No Rhonchi, No Wheezing  CVS:                         S1, S2,  No Murmur  NEURO:                  Alert, oriented, interactive, nonfocal, follows commands  PSYC:                      Paranoid ,  no  Insight.        Assessment   Lip swelling either anaphylaxis (food - Strawberries Blackberries, cranberry /Medicine -Cholchicine) vs angioedema(Ace)   Psychosis / Paranoia   Initial complaint of knee pain (she is not complaining at this time)  HTN, CAD, DM2, RA, Obesity    Plan  Continue care on gail  Swelling continues to improve despite patient refusing Steroids, pepcid, benadryl  Psyc f/u  hold ace, cholerine at this time.   case d/w Dr. Maxwell  Management of RA / knee pain as per primary team.  No active ccm issues reconsult as needed.

## 2019-10-21 NOTE — PROGRESS NOTE ADULT - SUBJECTIVE AND OBJECTIVE BOX
Follow-up Critical Care Progress Note  Chief Complaint : Difficulty in walking, not elsewhere classified    pt seen and examined  pt lip swelling practically apears to have resolved  no cp, sob, palp, difficulty breathing, speaking or swallowing        Allergies :Berries (Angioedema (Mild to Mod))  sulfa drugs (Rash)      PAST MEDICAL & SURGICAL HISTORY:  HLD (hyperlipidemia)  Thyroid disease  Pulmonary embolism  Hypertension  DM (diabetes mellitus)  MI (myocardial infarction)  CAD (coronary artery disease)  Obesity  Rheumatoid arthritis  History of hysterectomy      Medications:  MEDICATIONS  (STANDING):  aspirin enteric coated 81 milliGRAM(s) Oral daily  atorvastatin 40 milliGRAM(s) Oral at bedtime  clopidogrel Tablet 75 milliGRAM(s) Oral daily  dextrose 5%. 1000 milliLiter(s) (50 mL/Hr) IV Continuous <Continuous>  dextrose 50% Injectable 12.5 Gram(s) IV Push once  dextrose 50% Injectable 25 Gram(s) IV Push once  dextrose 50% Injectable 25 Gram(s) IV Push once  docusate sodium 100 milliGRAM(s) Oral three times a day  heparin  Injectable 5000 Unit(s) SubCutaneous every 8 hours  metoprolol tartrate 12.5 milliGRAM(s) Oral two times a day    MEDICATIONS  (PRN):  acetaminophen   Tablet .. 650 milliGRAM(s) Oral every 6 hours PRN Mild Pain (1 - 3)  dextrose 40% Gel 15 Gram(s) Oral once PRN Blood Glucose LESS THAN 70 milliGRAM(s)/deciliter  glucagon  Injectable 1 milliGRAM(s) IntraMuscular once PRN Glucose LESS THAN 70 milligrams/deciliter  OLANZapine Injectable 5 milliGRAM(s) IntraMuscular every 6 hours PRN Agitation  oxyCODONE    5 mG/acetaminophen 325 mG 1 Tablet(s) Oral every 6 hours PRN Moderate Pain (4 - 6)  senna 2 Tablet(s) Oral at bedtime PRN Constipation      VITALS:  T(C): 37.4 (10-21-19 @ 05:28), Max: 37.8 (10-20-19 @ 15:44)  T(F): 99.3 (10-21-19 @ 05:28), Max: 100 (10-20-19 @ 15:44)  HR: 68 (10-21-19 @ 05:28) (60 - 68)  BP: 109/52 (10-21-19 @ 05:28) (109/52 - 113/49)  BP(mean): --  ABP: --  ABP(mean): --  RR: 16 (10-21-19 @ 05:28) (16 - 16)  SpO2: 99% (10-21-19 @ 05:28) (99% - 100%)  CVP(mm Hg): --  CVP(cm H2O): --    Ins and Outs     10-21-19 @ 07:01  -  10-21-19 @ 13:45  --------------------------------------------------------  IN: 1500 mL / OUT: 401 mL / NET: 1099 mL                I&O's Detail    21 Oct 2019 07:01  -  21 Oct 2019 13:45  --------------------------------------------------------  IN:    Oral Fluid: 1500 mL  Total IN: 1500 mL    OUT:    Voided: 401 mL  Total OUT: 401 mL    Total NET: 1099 mL

## 2019-10-21 NOTE — PROGRESS NOTE ADULT - SUBJECTIVE AND OBJECTIVE BOX
Patient is a 83y old  Female who presents with a chief complaint of Knee pain/unable to ambulate (21 Oct 2019 13:44)      Interval HPI/ Overnight events: No events overnight    Patient seen and examined at bedside, complains of right knee pain, still refusing arthrocentesis, still adamant that she wants to go home despite not able to walk.    REVIEW OF SYSTEMS:    CONSTITUTIONAL: No weakness, fevers or chills  EYES/ENT: No visual changes;  No vertigo or throat pain   NECK: No pain or stiffness  RESPIRATORY: No cough, wheezing, hemoptysis; No shortness of breath  CARDIOVASCULAR: No chest pain or palpitations  GASTROINTESTINAL: No abdominal or epigastric pain. No nausea, vomiting, or hematemesis; No diarrhea or constipation. No melena or hematochezia.  GENITOURINARY: No dysuria, frequency or hematuria  NEUROLOGICAL: No numbness or weakness  SKIN: No itching, burning, rashes, or lesions   MSK: right knee pain  All other review of systems is negative unless indicated above.      ALLERGIES:  Berries (Angioedema (Mild to Mod))  sulfa drugs (Rash)    MEDICATIONS  (STANDING):  aspirin enteric coated 81 milliGRAM(s) Oral daily  atorvastatin 40 milliGRAM(s) Oral at bedtime  clopidogrel Tablet 75 milliGRAM(s) Oral daily  dextrose 5%. 1000 milliLiter(s) (50 mL/Hr) IV Continuous <Continuous>  dextrose 50% Injectable 12.5 Gram(s) IV Push once  dextrose 50% Injectable 25 Gram(s) IV Push once  dextrose 50% Injectable 25 Gram(s) IV Push once  docusate sodium 100 milliGRAM(s) Oral three times a day  heparin  Injectable 5000 Unit(s) SubCutaneous every 8 hours  metoprolol tartrate 12.5 milliGRAM(s) Oral two times a day    MEDICATIONS  (PRN):  acetaminophen   Tablet .. 650 milliGRAM(s) Oral every 6 hours PRN Mild Pain (1 - 3)  dextrose 40% Gel 15 Gram(s) Oral once PRN Blood Glucose LESS THAN 70 milliGRAM(s)/deciliter  glucagon  Injectable 1 milliGRAM(s) IntraMuscular once PRN Glucose LESS THAN 70 milligrams/deciliter  OLANZapine Injectable 5 milliGRAM(s) IntraMuscular every 6 hours PRN Agitation  oxyCODONE    5 mG/acetaminophen 325 mG 1 Tablet(s) Oral every 6 hours PRN Moderate Pain (4 - 6)  senna 2 Tablet(s) Oral at bedtime PRN Constipation    Vital Signs Last 24 Hrs  T(F): 99.3 (21 Oct 2019 05:28), Max: 100 (20 Oct 2019 15:44)  HR: 68 (21 Oct 2019 05:28) (60 - 68)  BP: 109/52 (21 Oct 2019 05:28) (109/52 - 113/49)  RR: 16 (21 Oct 2019 05:28) (16 - 16)  SpO2: 99% (21 Oct 2019 05:28) (99% - 100%)  I&O's Summary    21 Oct 2019 07:01  -  21 Oct 2019 13:48  --------------------------------------------------------  IN: 1500 mL / OUT: 401 mL / NET: 1099 mL        PHYSICAL EXAM:  General: NAD, well dressed, well nourished  Head: NT/AC  ENT: MMM, no oropharyngeal erythema or exudates. Mild right lower lip swelling  Neck: Supple, No JVD  Lungs: Clear to auscultation bilaterally, no wheezing, rales, or rhonchi, no accessory muscle use. No stridor  Cardio: RRR, normal S1/S2, No M/R/G  Abdomen: Normoactive BS, Abdomen soft, nontender, nondistended; no organomegaly  Extremities: No calf tenderness, no clubbing or  cyanosis. Swelling of right knee including posterior compartment remains unchanged, mildly tender to touch. No warmth or erythema  Vascular: no LE edema  Skin: warm and dry  Neuro: AAOx3, no focal deficits      LABS:                      TSH 2.21   TSH with FT4 reflex --  Total T3 --              POCT Blood Glucose.: 152 mg/dL (21 Oct 2019 07:45)    10-15 [id number] 6.1          RADIOLOGY & ADDITIONAL TESTS:    Care Discussed with Consultants/Other Providers:

## 2019-10-22 PROCEDURE — 99232 SBSQ HOSP IP/OBS MODERATE 35: CPT

## 2019-10-22 RX ORDER — CYCLOBENZAPRINE HYDROCHLORIDE 10 MG/1
5 TABLET, FILM COATED ORAL THREE TIMES A DAY
Refills: 0 | Status: COMPLETED | OUTPATIENT
Start: 2019-10-22 | End: 2019-10-23

## 2019-10-22 RX ORDER — IBUPROFEN 200 MG
600 TABLET ORAL ONCE
Refills: 0 | Status: COMPLETED | OUTPATIENT
Start: 2019-10-22 | End: 2019-10-24

## 2019-10-22 RX ORDER — CYCLOBENZAPRINE HYDROCHLORIDE 10 MG/1
5 TABLET, FILM COATED ORAL ONCE
Refills: 0 | Status: COMPLETED | OUTPATIENT
Start: 2019-10-22 | End: 2019-10-22

## 2019-10-22 RX ORDER — TRAMADOL HYDROCHLORIDE 50 MG/1
25 TABLET ORAL THREE TIMES A DAY
Refills: 0 | Status: DISCONTINUED | OUTPATIENT
Start: 2019-10-22 | End: 2019-10-22

## 2019-10-22 RX ORDER — TRAMADOL HYDROCHLORIDE 50 MG/1
50 TABLET ORAL EVERY 8 HOURS
Refills: 0 | Status: DISCONTINUED | OUTPATIENT
Start: 2019-10-22 | End: 2019-10-25

## 2019-10-22 RX ORDER — OXYCODONE AND ACETAMINOPHEN 5; 325 MG/1; MG/1
1 TABLET ORAL EVERY 6 HOURS
Refills: 0 | Status: DISCONTINUED | OUTPATIENT
Start: 2019-10-22 | End: 2019-10-25

## 2019-10-22 RX ADMIN — Medication 650 MILLIGRAM(S): at 04:14

## 2019-10-22 RX ADMIN — Medication 12.5 MILLIGRAM(S): at 07:20

## 2019-10-22 RX ADMIN — CYCLOBENZAPRINE HYDROCHLORIDE 5 MILLIGRAM(S): 10 TABLET, FILM COATED ORAL at 07:21

## 2019-10-22 RX ADMIN — TRAMADOL HYDROCHLORIDE 25 MILLIGRAM(S): 50 TABLET ORAL at 09:40

## 2019-10-22 RX ADMIN — Medication 650 MILLIGRAM(S): at 03:13

## 2019-10-22 RX ADMIN — TRAMADOL HYDROCHLORIDE 25 MILLIGRAM(S): 50 TABLET ORAL at 08:42

## 2019-10-22 RX ADMIN — Medication 100 MILLIGRAM(S): at 07:20

## 2019-10-22 NOTE — PROGRESS NOTE ADULT - SUBJECTIVE AND OBJECTIVE BOX
Patient is a 83y old  Female who presents with a chief complaint of Knee pain/unable to ambulate (21 Oct 2019 13:47)      Interval HPI/ Overnight events: No events overnight    Patient seen and examined at bedside, complains of right knee pain, still refusing arthrocentesis/ steroid injection    REVIEW OF SYSTEMS:    CONSTITUTIONAL: No weakness, fevers or chills  EYES/ENT: No visual changes;  No vertigo or throat pain   NECK: No pain or stiffness  RESPIRATORY: No cough, wheezing, hemoptysis; No shortness of breath  CARDIOVASCULAR: No chest pain or palpitations  GASTROINTESTINAL: No abdominal or epigastric pain. No nausea, vomiting, or hematemesis; No diarrhea or constipation.   GENITOURINARY: No dysuria, frequency or hematuria  NEUROLOGICAL: No numbness or weakness  MSK: right knee pain  All other review of systems is negative unless indicated above.      ALLERGIES:  Berries (Angioedema (Mild to Mod))  sulfa drugs (Rash)    MEDICATIONS  (STANDING):  aspirin enteric coated 81 milliGRAM(s) Oral daily  atorvastatin 40 milliGRAM(s) Oral at bedtime  clopidogrel Tablet 75 milliGRAM(s) Oral daily  cyclobenzaprine 5 milliGRAM(s) Oral three times a day  dextrose 5%. 1000 milliLiter(s) (50 mL/Hr) IV Continuous <Continuous>  dextrose 50% Injectable 12.5 Gram(s) IV Push once  dextrose 50% Injectable 25 Gram(s) IV Push once  dextrose 50% Injectable 25 Gram(s) IV Push once  docusate sodium 100 milliGRAM(s) Oral three times a day  heparin  Injectable 5000 Unit(s) SubCutaneous every 8 hours  ibuprofen  Tablet. 600 milliGRAM(s) Oral once  metoprolol tartrate 12.5 milliGRAM(s) Oral two times a day    MEDICATIONS  (PRN):  acetaminophen   Tablet .. 650 milliGRAM(s) Oral every 6 hours PRN Mild Pain (1 - 3)  dextrose 40% Gel 15 Gram(s) Oral once PRN Blood Glucose LESS THAN 70 milliGRAM(s)/deciliter  glucagon  Injectable 1 milliGRAM(s) IntraMuscular once PRN Glucose LESS THAN 70 milligrams/deciliter  OLANZapine Injectable 5 milliGRAM(s) IntraMuscular every 6 hours PRN Agitation  senna 2 Tablet(s) Oral at bedtime PRN Constipation  traMADol 25 milliGRAM(s) Oral three times a day PRN moderate to (4-6) to Severe Pain (7 - 10)    Vital Signs Last 24 Hrs  T(F): 97.4 (22 Oct 2019 07:08), Max: 98.5 (21 Oct 2019 16:40)  HR: 64 (22 Oct 2019 07:08) (58 - 66)  BP: 110/50 (22 Oct 2019 07:08) (110/50 - 120/58)  RR: 15 (22 Oct 2019 07:08) (15 - 16)  SpO2: 98% (22 Oct 2019 07:08) (98% - 98%)  I&O's Summary    21 Oct 2019 07:01  -  22 Oct 2019 07:00  --------------------------------------------------------  IN: 1500 mL / OUT: 401 mL / NET: 1099 mL    22 Oct 2019 07:01  -  22 Oct 2019 13:25  --------------------------------------------------------  IN: 600 mL / OUT: 0 mL / NET: 600 mL        PHYSICAL EXAM:  General: NAD, well dressed, well nourished  Head: NT/AC  ENT: MMM, no oropharyngeal erythema or exudates. Mild right lower lip swelling  Neck: Supple, No JVD  Lungs: Clear to auscultation bilaterally, no wheezing, rales, or rhonchi, no accessory muscle use. No stridor  Cardio: RRR, normal S1/S2, No M/R/G  Abdomen: Normoactive BS, Abdomen soft, nontender, nondistended; no organomegaly  Extremities: No calf tenderness, no clubbing or  cyanosis. Swelling of right knee mostly posterior compartment stable, tender to touch. No warmth or erythema  Vascular: no LE edema  Skin: warm and dry  Neuro: AAOx3, no focal deficits    LABS:                      TSH 2.21   TSH with FT4 reflex --  Total T3 --                10-15 [id number] 6.1          RADIOLOGY & ADDITIONAL TESTS:    Care Discussed with Consultants/Other Providers:

## 2019-10-23 LAB — GLUCOSE BLDC GLUCOMTR-MCNC: 122 MG/DL — HIGH (ref 70–99)

## 2019-10-23 PROCEDURE — 99232 SBSQ HOSP IP/OBS MODERATE 35: CPT

## 2019-10-23 RX ADMIN — TRAMADOL HYDROCHLORIDE 50 MILLIGRAM(S): 50 TABLET ORAL at 03:29

## 2019-10-23 RX ADMIN — Medication 12.5 MILLIGRAM(S): at 06:00

## 2019-10-23 RX ADMIN — TRAMADOL HYDROCHLORIDE 50 MILLIGRAM(S): 50 TABLET ORAL at 02:29

## 2019-10-23 RX ADMIN — Medication 100 MILLIGRAM(S): at 21:38

## 2019-10-23 RX ADMIN — OXYCODONE AND ACETAMINOPHEN 1 TABLET(S): 5; 325 TABLET ORAL at 22:30

## 2019-10-23 RX ADMIN — CYCLOBENZAPRINE HYDROCHLORIDE 5 MILLIGRAM(S): 10 TABLET, FILM COATED ORAL at 06:00

## 2019-10-23 RX ADMIN — Medication 100 MILLIGRAM(S): at 06:00

## 2019-10-23 RX ADMIN — CYCLOBENZAPRINE HYDROCHLORIDE 5 MILLIGRAM(S): 10 TABLET, FILM COATED ORAL at 21:38

## 2019-10-23 RX ADMIN — OXYCODONE AND ACETAMINOPHEN 1 TABLET(S): 5; 325 TABLET ORAL at 21:38

## 2019-10-23 NOTE — PROGRESS NOTE ADULT - ASSESSMENT
84 yo female with HTN, HLD, CAD, Type 2 DM, RA, CKD stage 3, Obesity presents from home for worsening right knee pain, swelling and inability to ambulate

## 2019-10-23 NOTE — PROGRESS NOTE ADULT - PROBLEM SELECTOR PLAN 8
Stage 3 with baseline creatinine 1.2-1.3.  Monitor renal function, electrolytes and urine output
Supportive care, analgesia PRN
Stage 3 with baseline creatinine 1.2-1.3.  Monitor renal function, electrolytes and urine output

## 2019-10-23 NOTE — PROGRESS NOTE ADULT - PROBLEM SELECTOR PROBLEM 5
Type 2 diabetes mellitus
HLD (hyperlipidemia)
Type 2 diabetes mellitus

## 2019-10-23 NOTE — CHART NOTE - NSCHARTNOTEFT_GEN_A_CORE
NUTRITION FOLLOW UP    SOURCE: Patient [X)   Family [ ]     other [ ]    DIET: DASH    PATIENT REPORT[ ] nausea  [ ] vomiting [ ] diarrhea [ ] constipation  [ ]chewing problems [ ] swallowing issues  [ ] other: no GI distress    PO INTAKE:  < 50% [ ]   50-75%  [X ]   %  []  other :    SOURCE: for PO intake [X] Patient [ ] family [ ] chart [ ] staff [ ] other    ENTERAL/PARENTERAL NUTRITION: n/a    CURRENT WEIGHT:  10/20/19   115.3 KG.    PERTINENT LABS:      Creatinine    ACCUCHECK      SKIN: intact, edema right knee, wt. gain possibly secondary to edema    ESTIMATED NEEDS:   [X] no change since previous assessment  [ ] recalculated:     PREVIOUS NUTRITION DIAGNOSIS: addressed    NUTRITION DIAGNOSIS is   [X] ongoing    NEW NUTRITION DIAGNOSIS: [X] not applicable    MONITORING AND EVALUATION:   Current diet order is appropriate and is well tolerated, but will monitor for any changes that may be needed    [X] PO intake [X] Tolerance to diet prescription [X] weights [X] follow up per protocol    NUTRITION RECOMMENDATIONS: Suggest changing diet to DASH CONS. CHO.    AVANI remains available YANET Rao RD, CDE

## 2019-10-23 NOTE — PROGRESS NOTE ADULT - PROBLEM SELECTOR PLAN 6
LFT's stable, continue statin, though pt is refusing to take meds
Hold lisinopril, continue beta blocker, monitor BP
LFT's stable, continue statin, though pt is refusing to take meds

## 2019-10-23 NOTE — PROGRESS NOTE ADULT - PROBLEM SELECTOR PROBLEM 8
CKD (chronic kidney disease)
Rheumatoid arthritis
CKD (chronic kidney disease)

## 2019-10-23 NOTE — PROGRESS NOTE ADULT - PROBLEM SELECTOR PLAN 7
- Hold lisinopril for concern of possible angioedema  - BP normotensive, on metoprolol but refuses to take it. If persistently >170 systolic, can use IV labetalol
Stage 3 with baseline creatinine 1.2-1.3.  Monitor renal function, electrolytes and urine output
- Hold lisinopril for concern of possible angioedema  - BP normotensive, on metoprolol but refuses to take it. If persistently >170 systolic, can use IV labetalol

## 2019-10-23 NOTE — PROGRESS NOTE ADULT - PROBLEM SELECTOR PROBLEM 4
CAD (coronary artery disease)
Type 2 diabetes mellitus
CAD (coronary artery disease)

## 2019-10-23 NOTE — PROGRESS NOTE ADULT - PROBLEM SELECTOR PLAN 4
With history of cardiac stents, continue medical management with ASA, Plavix and statin
Ha1c 6.1, no home meds.  Continue to monitor,
With history of cardiac stents, continue medical management with ASA, Plavix and statin

## 2019-10-23 NOTE — PROGRESS NOTE ADULT - SUBJECTIVE AND OBJECTIVE BOX
Patient is a 83y old  Female who presents with a chief complaint of Knee pain/unable to ambulate (22 Oct 2019 13:23)      Interval HPI/ Overnight events: No events overnight    Patient seen and examined at bedside, has right knee pain, unable to walk with PT yesterday    REVIEW OF SYSTEMS:    CONSTITUTIONAL: No weakness, fevers or chills  EYES/ENT: No visual changes;  No vertigo or throat pain   NECK: No pain or stiffness  RESPIRATORY: No cough, wheezing, hemoptysis; No shortness of breath  CARDIOVASCULAR: No chest pain or palpitations  GASTROINTESTINAL: No abdominal or epigastric pain. No nausea, vomiting, or hematemesis; No diarrhea or constipation. No melena or hematochezia.  GENITOURINARY: No dysuria, frequency or hematuria  NEUROLOGICAL: No numbness or weakness  SKIN: No itching, burning, rashes, or lesions   MSK: right knee pain  All other review of systems is negative unless indicated above.      ALLERGIES:  Berries (Angioedema (Mild to Mod))  sulfa drugs (Rash)    MEDICATIONS  (STANDING):  aspirin enteric coated 81 milliGRAM(s) Oral daily  atorvastatin 40 milliGRAM(s) Oral at bedtime  clopidogrel Tablet 75 milliGRAM(s) Oral daily  cyclobenzaprine 5 milliGRAM(s) Oral three times a day  dextrose 5%. 1000 milliLiter(s) (50 mL/Hr) IV Continuous <Continuous>  dextrose 50% Injectable 12.5 Gram(s) IV Push once  dextrose 50% Injectable 25 Gram(s) IV Push once  dextrose 50% Injectable 25 Gram(s) IV Push once  docusate sodium 100 milliGRAM(s) Oral three times a day  heparin  Injectable 5000 Unit(s) SubCutaneous every 8 hours  ibuprofen  Tablet. 600 milliGRAM(s) Oral once  metoprolol tartrate 12.5 milliGRAM(s) Oral two times a day    MEDICATIONS  (PRN):  acetaminophen   Tablet .. 650 milliGRAM(s) Oral every 6 hours PRN Mild Pain (1 - 3)  dextrose 40% Gel 15 Gram(s) Oral once PRN Blood Glucose LESS THAN 70 milliGRAM(s)/deciliter  glucagon  Injectable 1 milliGRAM(s) IntraMuscular once PRN Glucose LESS THAN 70 milligrams/deciliter  OLANZapine Injectable 5 milliGRAM(s) IntraMuscular every 6 hours PRN Agitation  oxycodone    5 mG/acetaminophen 325 mG 1 Tablet(s) Oral every 6 hours PRN Severe Pain (7 - 10)  senna 2 Tablet(s) Oral at bedtime PRN Constipation  traMADol 50 milliGRAM(s) Oral every 8 hours PRN Moderate Pain (4 - 6)    Vital Signs Last 24 Hrs  T(F): 97.9 (23 Oct 2019 06:03), Max: 97.9 (23 Oct 2019 06:03)  HR: 67 (23 Oct 2019 06:03) (67 - 73)  BP: 103/55 (23 Oct 2019 06:03) (103/52 - 131/61)  RR: 15 (23 Oct 2019 06:03) (15 - 16)  SpO2: 98% (23 Oct 2019 06:03) (98% - 98%)  I&O's Summary    22 Oct 2019 07:01  -  23 Oct 2019 07:00  --------------------------------------------------------  IN: 1200 mL / OUT: 0 mL / NET: 1200 mL    23 Oct 2019 07:01  -  23 Oct 2019 11:32  --------------------------------------------------------  IN: 360 mL / OUT: 0 mL / NET: 360 mL        PHYSICAL EXAM:  General: NAD, well dressed, well nourished  Head: NT/AC  ENT: MMM, no oropharyngeal erythema or exudates. Lip swelling completely resolved  Neck: Supple, No JVD  Lungs: Clear to auscultation bilaterally, no wheezing, rales, or rhonchi, no accessory muscle use. No stridor  Cardio: RRR, normal S1/S2, No M/R/G  Abdomen: Normoactive BS, Abdomen soft, nontender, nondistended; no organomegaly  Extremities: No calf tenderness, no clubbing or  cyanosis. Swelling of right knee mostly posterior compartment stable, tender to touch, warm to touch but no erythema.  Vascular: no LE edema  Skin: warm and dry  Neuro: AAOx3, no focal deficits      LABS:      TSH 2.21   TSH with FT4 reflex --  Total T3 --              POCT Blood Glucose.: 122 mg/dL (23 Oct 2019 09:15)    10-15 [id number] 6.1          RADIOLOGY & ADDITIONAL TESTS:    Care Discussed with Consultants/Other Providers:

## 2019-10-23 NOTE — PROGRESS NOTE ADULT - PROBLEM SELECTOR PLAN 5
Ha1c 6.1, no home meds. FSG acceptable- does not need q6hrs FSG
LFT's stable, continue statin, outpatient follow up for fasting lipid panel
Ha1c 6.1, no home meds. FSG acceptable- does not need q6hrs FSG

## 2019-10-23 NOTE — PROGRESS NOTE ADULT - PROBLEM SELECTOR PLAN 3
With history of cardiac stents, continue medical management with ASA, Plavix and statin
Chronic, continue supportive care. CT R knee demonstrates arthritis with effusion in the setting of elevated uric acid.    - colchicine has been discontinued for concern of allergic reaction  - prednisone has been discontinued due to low suspicion for acute gout, also with acute psychosis  - pt refuses arthrocentesis and other meds
Could have been precipitated by steroids? which has been discontinued since 10/16. Pt is not combative or aggressive toward staff, but she has paranoia and refuses to take meds. Suspects degree of dementia.  -  Zyprexa PRN for agitation, though has not required any doses  - pt was evaluated by Psych, does not have decision making capacity, dispo pending family's decision to dispo to [facility] vs. home
Could have been precipitated by steroids? which has been discontinued since 10/16. Pt is not combative or aggressive toward staff, but she has paranoia and refuses to take meds. Suspects degree of dementia.  -  Zyprexa PRN for agitation, though has not required any doses  - pt was evaluated by Psych, does not have decision making capacity  - spoke with family, daughter Yanira, and son Andrea, both agree that patient to be discharged to rehab
Could have been precipitated by steroids? which has been discontinued since 10/16. Pt is not combative or aggressive toward staff, but she has paranoia and refuses to take meds. Suspects degree of dementia.  -  Zyprexa PRN for agitation, though has not required any doses  - pt was evaluated by Psych, does not have decision making capacity  - spoke with family, daughter Yanira, and son Andrea, both agree that patient to be discharged to rehab

## 2019-10-23 NOTE — PROGRESS NOTE ADULT - ASSESSMENT
84 yo F with prior dx of ?RA however does appear to clinically have symptoms, prior hx of gout with flare approx 1 year ago, admitted with severe R knee pain limiting ambulation. Appears to have exam more consistent with ?crystalline vs inflammatory arthritis today but unable to do full exam 2/2 patient compliance. Would like to perform arthrocentesis for diagnostic purposes and to provide some relief -- this was all explained to patient. ESR/CRP elevation is non-specific but marked, RF -/CCP + but no overt signs consistent with RA except R knee.     - multiple attempts to reach pt's daughter and son to obtain consent as pt without ability to consent at present -- have not been able to reach them, will try again tomorrow morning.   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

## 2019-10-23 NOTE — PROGRESS NOTE ADULT - PROBLEM SELECTOR PLAN 9
Supportive care, analgesia PRN
Supportive care, analgesia PRN
BMI (kg/m2): 37.1
Supportive care, analgesia PRN
Supportive care, analgesia PRN

## 2019-10-23 NOTE — PROGRESS NOTE ADULT - PROBLEM SELECTOR PLAN 1
Patient on home lisinopril 2.5mg, increased to 5mg this AM.  Cannot exclude drug reaction, will consult ICU, start glucagon and steroids
Allergic reaction to food  (Strawberries Blackberries, cranberry, colchicine?) vs. angioedema (pt was on  ACEi). Lip swelling has improved, no signs of airway compromise.   - Hold ACEi and colchicine  - Benadryl PRN however pt is refusing all meds
Allergic reaction to food  (Strawberries Blackberries, cranberry, colchicine?) vs. angioedema (pt was on  ACEi). Lip swelling has improved, no signs of airway compromise.   - Hold ACEi and colchicine  - Benadryl PRN however pt is refusing all meds
Allergic reaction to food  (Strawberries Blackberries, cranberry, colchicine?) vs. angioedema (pt was on  ACEi). Lip swelling has resolved   - Hold ACEi and colchicine
Allergic reaction to food  (Strawberries Blackberries, cranberry, colchicine?) vs. angioedema (pt was on  ACEi). Lip swelling has resolved   - Hold ACEi and colchicine

## 2019-10-23 NOTE — PROGRESS NOTE ADULT - PROBLEM SELECTOR PLAN 2
Chronic, continue supportive care. CT R knee demonstrates arthritis with effusion in the setting of elevated uric acid.  Continue analgesia PRN and colchicine for acute gout flare, possible arthrocentesis
Could have been precipitated by steroids? which has been discontinued since 10/16  -  check TFTs, RPR  - Zyprexa PRN for agitation/ aggression  - will need to involve son regarding decision making as patient does not have capacity at this time
Chronic, continue supportive care. CT R knee demonstrates arthritis with effusion in the setting of elevated uric acid.    - colchicine has been discontinued for concern of allergic reaction  - prednisone has been discontinued due to low suspicion for acute gout, and may worsen paranoia  - pt refuses arthrocentesis and other meds  - pt offered intra-articular steroid injection but she refuses
Chronic, continue supportive care. CT R knee demonstrates arthritis with effusion in the setting of elevated uric acid.    - colchicine has been discontinued for concern of allergic reaction  - prednisone has been discontinued due to low suspicion for acute gout, and may worsen paranoia  - pt refuses arthrocentesis and other meds  - pt offered intra-articular steroid injection but she refuses
Chronic, continue supportive care. CT R knee demonstrates arthritis with effusion in the setting of elevated uric acid.    - colchicine has been discontinued for concern of allergic reaction  - prednisone has been discontinued due to low suspicion for acute gout, and may worsen paranoia  - Pt agreed to arthrocentesis and possible intra-articular steroids, Dr. Terry ([facility]) to come by later today.

## 2019-10-23 NOTE — PROGRESS NOTE ADULT - SUBJECTIVE AND OBJECTIVE BOX
Called back to evaluate for arthrocentesis and possible steroid injection as pt now amenable. Pt seen/examined. Continues to have swelling, extreme tenderness to touch and mild warmth over R knee. Remainder of joints without synovitis. Interval changes since my last evaluation -- pt has had episodes of agitation/ psychosis, evaluated by psych, deemed not to have capacity. Also had some angioedema, ?cause, suspected meds have been stopped and it has resolved.     PATRICK ADKINS  [id number]    INTERVAL HPI/OVERNIGHT EVENTS: Pt seen/examined. Continues to have swelling, extreme tenderness to touch and mild warmth over R knee. Remainder of joints without synovitis. Interval changes since my last evaluation -- pt has had episodes of agitation/ psychosis, evaluated by psych, deemed not to have capacity. Also had some angioedema, ?cause, suspected meds have been stopped and it has resolved.     MEDICATIONS  (STANDING):  aspirin enteric coated 81 milliGRAM(s) Oral daily  atorvastatin 40 milliGRAM(s) Oral at bedtime  clopidogrel Tablet 75 milliGRAM(s) Oral daily  cyclobenzaprine 5 milliGRAM(s) Oral three times a day  dextrose 5%. 1000 milliLiter(s) (50 mL/Hr) IV Continuous <Continuous>  dextrose 50% Injectable 12.5 Gram(s) IV Push once  dextrose 50% Injectable 25 Gram(s) IV Push once  dextrose 50% Injectable 25 Gram(s) IV Push once  docusate sodium 100 milliGRAM(s) Oral three times a day  heparin  Injectable 5000 Unit(s) SubCutaneous every 8 hours  ibuprofen  Tablet. 600 milliGRAM(s) Oral once  metoprolol tartrate 12.5 milliGRAM(s) Oral two times a day    MEDICATIONS  (PRN):  acetaminophen   Tablet .. 650 milliGRAM(s) Oral every 6 hours PRN Mild Pain (1 - 3)  dextrose 40% Gel 15 Gram(s) Oral once PRN Blood Glucose LESS THAN 70 milliGRAM(s)/deciliter  glucagon  Injectable 1 milliGRAM(s) IntraMuscular once PRN Glucose LESS THAN 70 milligrams/deciliter  OLANZapine Injectable 5 milliGRAM(s) IntraMuscular every 6 hours PRN Agitation  oxycodone    5 mG/acetaminophen 325 mG 1 Tablet(s) Oral every 6 hours PRN Severe Pain (7 - 10)  senna 2 Tablet(s) Oral at bedtime PRN Constipation  traMADol 50 milliGRAM(s) Oral every 8 hours PRN Moderate Pain (4 - 6)      Allergies  Berries (Angioedema (Mild to Mod))  sulfa drugs (Rash)    Review of Systems:   General: No fevers/chills, no fatigue  HEENT: No blurry vision, dysphagia, or odynophagia  CVS: No CP/palpitations  Resp: No SOB/wheezing  GI: No N/V/C/D/abdominal pain  MSK: see above  Skin: No new rashes  Neuro: No headaches      Vital Signs Last 24 Hrs  T(C): 36.8 (23 Oct 2019 15:50), Max: 36.8 (23 Oct 2019 15:50)  T(F): 98.2 (23 Oct 2019 15:50), Max: 98.2 (23 Oct 2019 15:50)  HR: 72 (23 Oct 2019 15:50) (67 - 73)  BP: 135/58 (23 Oct 2019 15:50) (103/55 - 135/58)  RR: 16 (23 Oct 2019 15:50) (15 - 16)  SpO2: 99% (23 Oct 2019 15:50) (98% - 99%)    Physical Exam:  General: No apparent distress, AAOx 2 but lacks insight   HEENT: EOMI, MMM  CVS: +S1/S2, RRR  Resp: CTA b/l  GI: Soft, NT/ND  MSK: + TTP and fullness over popliteal region of R knee, larger anterior effusion with mild warmth today and tenderness to light touch, knee in fully flexed position and pt would not cooperate to extend joint for full exam. No other synovitis, ROM intact. No tophi appreciated. OA changes in hands, no chronic deformity consistent with RA appreciated.   Neuro: pt not cooperative with strength or ROM exams   Skin: no  rashes    LABS: recent labs reviewed. Of note RF negative but CCP low positive     RADIOLOGY & ADDITIONAL TESTS: no new imaging since last evaluation

## 2019-10-24 ENCOUNTER — TRANSCRIPTION ENCOUNTER (OUTPATIENT)
Age: 84
End: 2019-10-24

## 2019-10-24 LAB
GRAM STN FLD: SIGNIFICANT CHANGE UP
SPECIMEN SOURCE: SIGNIFICANT CHANGE UP

## 2019-10-24 PROCEDURE — 99232 SBSQ HOSP IP/OBS MODERATE 35: CPT | Mod: 25

## 2019-10-24 PROCEDURE — 99239 HOSP IP/OBS DSCHRG MGMT >30: CPT

## 2019-10-24 RX ORDER — ASPIRIN/CALCIUM CARB/MAGNESIUM 324 MG
1 TABLET ORAL
Qty: 0 | Refills: 0 | DISCHARGE
Start: 2019-10-24

## 2019-10-24 RX ORDER — TRAMADOL HYDROCHLORIDE 50 MG/1
1 TABLET ORAL
Qty: 0 | Refills: 0 | DISCHARGE
Start: 2019-10-24

## 2019-10-24 RX ORDER — OLANZAPINE 15 MG/1
5 TABLET, FILM COATED ORAL
Qty: 0 | Refills: 0 | DISCHARGE
Start: 2019-10-24

## 2019-10-24 RX ORDER — DOCUSATE SODIUM 100 MG
1 CAPSULE ORAL
Qty: 0 | Refills: 0 | DISCHARGE
Start: 2019-10-24

## 2019-10-24 RX ORDER — LIDOCAINE 4 G/100G
1 CREAM TOPICAL DAILY
Refills: 0 | Status: DISCONTINUED | OUTPATIENT
Start: 2019-10-24 | End: 2019-10-25

## 2019-10-24 RX ORDER — LIDOCAINE 4 G/100G
1 CREAM TOPICAL
Qty: 0 | Refills: 0 | DISCHARGE
Start: 2019-10-24

## 2019-10-24 RX ORDER — CLOPIDOGREL BISULFATE 75 MG/1
1 TABLET, FILM COATED ORAL
Qty: 0 | Refills: 0 | DISCHARGE
Start: 2019-10-24

## 2019-10-24 RX ORDER — SENNA PLUS 8.6 MG/1
2 TABLET ORAL
Qty: 0 | Refills: 0 | DISCHARGE
Start: 2019-10-24

## 2019-10-24 RX ORDER — CLOPIDOGREL BISULFATE 75 MG/1
1 TABLET, FILM COATED ORAL
Qty: 0 | Refills: 0 | DISCHARGE

## 2019-10-24 RX ADMIN — ATORVASTATIN CALCIUM 40 MILLIGRAM(S): 80 TABLET, FILM COATED ORAL at 21:46

## 2019-10-24 RX ADMIN — LIDOCAINE 1 PATCH: 4 CREAM TOPICAL at 12:17

## 2019-10-24 RX ADMIN — Medication 100 MILLIGRAM(S): at 21:46

## 2019-10-24 RX ADMIN — CLOPIDOGREL BISULFATE 75 MILLIGRAM(S): 75 TABLET, FILM COATED ORAL at 11:27

## 2019-10-24 RX ADMIN — HEPARIN SODIUM 5000 UNIT(S): 5000 INJECTION INTRAVENOUS; SUBCUTANEOUS at 21:46

## 2019-10-24 RX ADMIN — Medication 81 MILLIGRAM(S): at 11:27

## 2019-10-24 RX ADMIN — Medication 600 MILLIGRAM(S): at 09:47

## 2019-10-24 RX ADMIN — Medication 100 MILLIGRAM(S): at 06:26

## 2019-10-24 RX ADMIN — LIDOCAINE 1 PATCH: 4 CREAM TOPICAL at 18:30

## 2019-10-24 RX ADMIN — Medication 12.5 MILLIGRAM(S): at 06:26

## 2019-10-24 RX ADMIN — Medication 12.5 MILLIGRAM(S): at 17:55

## 2019-10-24 RX ADMIN — Medication 600 MILLIGRAM(S): at 11:24

## 2019-10-24 NOTE — DISCHARGE NOTE PROVIDER - NSDCCPCAREPLAN_GEN_ALL_CORE_FT
PRINCIPAL DISCHARGE DIAGNOSIS  Diagnosis: Impaired ambulation  Assessment and Plan of Treatment: -follow up with pmd and rheum  - take medications as rx      SECONDARY DISCHARGE DIAGNOSES  Diagnosis: Arthritis of knee  Assessment and Plan of Treatment:

## 2019-10-24 NOTE — PROGRESS NOTE ADULT - ASSESSMENT
82 yo F with prior dx of ?RA however doesn't appear to clinically have symptoms, prior hx of gout with flare approx 1 year ago, admitted with severe R knee pain limiting ambulation. Appears to have exam more consistent with ?crystalline vs inflammatory arthritis and s/p arthrocentesis today with steroid injection. Pending discharge to rehab.    - will f/u cx, cell count, crystal analysis of fluid   - c/w pain management with tylenol and lidoderm patches, cold compresses  - CT consistent with advanced OA in R knee, ?area of lucency, which will be f/u OP with MRI   - stable for discharge from rheum perspective as long as gram stain of fluid is negative. Can f/u OP -- call [phone] for appointment.

## 2019-10-24 NOTE — PROGRESS NOTE ADULT - SUBJECTIVE AND OBJECTIVE BOX
Patient is a 83y old  Female who presents with a chief complaint of Knee pain/unable to ambulate (23 Oct 2019 16:29)      Patient seen and examined at bedside.     Dr Terry rheumatology and myself tried to contact patient family Jodie [phone] and Andrea [phone] on multiple occasions no answer - as patient medically stable can follow up out patient for arthrocentesis as unable to obtain consent at this time   patient medically stable for discharge at this time     has not followed up with pcp and no showed for Dr Kaye group in over year     Time spent on patients discharge 32 minutes     ALLERGIES:  Berries (Angioedema (Mild to Mod))  sulfa drugs (Rash)    MEDICATIONS  (STANDING):  aspirin enteric coated 81 milliGRAM(s) Oral daily  atorvastatin 40 milliGRAM(s) Oral at bedtime  clopidogrel Tablet 75 milliGRAM(s) Oral daily  dextrose 5%. 1000 milliLiter(s) (50 mL/Hr) IV Continuous <Continuous>  dextrose 50% Injectable 12.5 Gram(s) IV Push once  dextrose 50% Injectable 25 Gram(s) IV Push once  dextrose 50% Injectable 25 Gram(s) IV Push once  docusate sodium 100 milliGRAM(s) Oral three times a day  heparin  Injectable 5000 Unit(s) SubCutaneous every 8 hours  lidocaine   Patch 1 Patch Transdermal daily  metoprolol tartrate 12.5 milliGRAM(s) Oral two times a day    MEDICATIONS  (PRN):  acetaminophen   Tablet .. 650 milliGRAM(s) Oral every 6 hours PRN Mild Pain (1 - 3)  dextrose 40% Gel 15 Gram(s) Oral once PRN Blood Glucose LESS THAN 70 milliGRAM(s)/deciliter  glucagon  Injectable 1 milliGRAM(s) IntraMuscular once PRN Glucose LESS THAN 70 milligrams/deciliter  OLANZapine Injectable 5 milliGRAM(s) IntraMuscular every 6 hours PRN Agitation  oxycodone    5 mG/acetaminophen 325 mG 1 Tablet(s) Oral every 6 hours PRN Severe Pain (7 - 10)  senna 2 Tablet(s) Oral at bedtime PRN Constipation  traMADol 50 milliGRAM(s) Oral every 8 hours PRN Moderate Pain (4 - 6)    Vital Signs Last 24 Hrs  T(F): 98.1 (24 Oct 2019 05:38), Max: 98.5 (23 Oct 2019 21:32)  HR: 63 (24 Oct 2019 05:38) (63 - 72)  BP: 129/66 (24 Oct 2019 05:38) (125/54 - 135/58)  RR: 15 (24 Oct 2019 05:38) (14 - 16)  SpO2: 98% (24 Oct 2019 05:38) (98% - 99%)  I&O's Summary    23 Oct 2019 07:01  -  24 Oct 2019 07:00  --------------------------------------------------------  IN: 720 mL / OUT: 0 mL / NET: 720 mL    24 Oct 2019 07:01  -  24 Oct 2019 15:12  --------------------------------------------------------  IN: 340 mL / OUT: 0 mL / NET: 340 mL      PHYSICAL EXAM:  General: NAD, A/O x 3  ENT: MMM, no thrush  Neck: Supple, No JVD  Lungs: Clear to auscultation bilaterally, good air entry, non-labored breathing  Cardio: RRR, S1/S2, No murmur  Abdomen: Soft, Nontender, Nondistended; Bowel sounds present  Extremities: No calf tenderness, No pitting edema    LABS:    10-15 [id number] 6.1    Care Discussed with Consultants/Other Providers:   Rheumatology Patient is a 83y old  Female who presents with a chief complaint of Knee pain/unable to ambulate (23 Oct 2019 16:29)      Patient seen and examined at bedside.     Dr Terry rheumatology and myself tried to contact patient family Jodie [phone] and Andrea [phone] on multiple occasions no answer - as patient medically stable can follow up out patient for arthrocentesis as unable to obtain consent at this time   patient medically stable for discharge at this time     has not followed up with pcp and no showed for Dr Kaye group in over year     Time spent on patients discharge 32 minutes     ALLERGIES:  Berries (Angioedema (Mild to Mod))  sulfa drugs (Rash)    MEDICATIONS  (STANDING):  aspirin enteric coated 81 milliGRAM(s) Oral daily  atorvastatin 40 milliGRAM(s) Oral at bedtime  clopidogrel Tablet 75 milliGRAM(s) Oral daily  dextrose 5%. 1000 milliLiter(s) (50 mL/Hr) IV Continuous <Continuous>  dextrose 50% Injectable 12.5 Gram(s) IV Push once  dextrose 50% Injectable 25 Gram(s) IV Push once  dextrose 50% Injectable 25 Gram(s) IV Push once  docusate sodium 100 milliGRAM(s) Oral three times a day  heparin  Injectable 5000 Unit(s) SubCutaneous every 8 hours  lidocaine   Patch 1 Patch Transdermal daily  metoprolol tartrate 12.5 milliGRAM(s) Oral two times a day    MEDICATIONS  (PRN):  acetaminophen   Tablet .. 650 milliGRAM(s) Oral every 6 hours PRN Mild Pain (1 - 3)  dextrose 40% Gel 15 Gram(s) Oral once PRN Blood Glucose LESS THAN 70 milliGRAM(s)/deciliter  glucagon  Injectable 1 milliGRAM(s) IntraMuscular once PRN Glucose LESS THAN 70 milligrams/deciliter  OLANZapine Injectable 5 milliGRAM(s) IntraMuscular every 6 hours PRN Agitation  oxycodone    5 mG/acetaminophen 325 mG 1 Tablet(s) Oral every 6 hours PRN Severe Pain (7 - 10)  senna 2 Tablet(s) Oral at bedtime PRN Constipation  traMADol 50 milliGRAM(s) Oral every 8 hours PRN Moderate Pain (4 - 6)    Vital Signs Last 24 Hrs  T(F): 98.1 (24 Oct 2019 05:38), Max: 98.5 (23 Oct 2019 21:32)  HR: 63 (24 Oct 2019 05:38) (63 - 72)  BP: 129/66 (24 Oct 2019 05:38) (125/54 - 135/58)  RR: 15 (24 Oct 2019 05:38) (14 - 16)  SpO2: 98% (24 Oct 2019 05:38) (98% - 99%)  I&O's Summary    23 Oct 2019 07:01  -  24 Oct 2019 07:00  --------------------------------------------------------  IN: 720 mL / OUT: 0 mL / NET: 720 mL    24 Oct 2019 07:01  -  24 Oct 2019 15:12  --------------------------------------------------------  IN: 340 mL / OUT: 0 mL / NET: 340 mL    Physical Exam   General: NAD, well dressed, well nourished  Head: NT/AC  ENT: MMM, no thrush  Neck: Supple, No JVD  Lungs: Clear to auscultation bilaterally, no wheezing, rales, or rhonchi  Cardio: +S1/S2; no edema b/l le  Abdomen: Normoactive BS, Abdomen soft, nontender, nondistended;   Extremities: No calf tenderness, no clubbing or  cyanosis. Swelling of right knee mostly posterior     LABS:    10-15 [id number] 6.1    Care Discussed with Consultants/Other Providers:   Rheumatology

## 2019-10-24 NOTE — CHART NOTE - NSCHARTNOTEFT_GEN_A_CORE
spoke to patient daughter Casie [phone] on phone-> advised patient stable for discharge and should follow up outpatient with rheumatology for arthrocentesis

## 2019-10-24 NOTE — PROGRESS NOTE ADULT - ASSESSMENT
84 yo female with HTN, HLD, CAD, Type 2 DM, RA, CKD stage 3, Obesity presents from home for worsening right knee pain, swelling and inability to ambulate    # Lip swelling  - Allergic reaction to food  (Strawberries Blackberries, cranberry, colchicine?) vs. angioedema (pt was on  ACEi). Lip swelling has resolved  - Hold ACEi and colchicine.     # Right knee pain  - Chronic, continue supportive care. CT R knee demonstrates arthritis with effusion in the setting of elevated uric acid.    - colchicine has been discontinued for concern of allergic reaction  - prednisone has been discontinued due to low suspicion for acute gout, and may worsen paranoia  - Dr Terry rheumatology and myself tried to contact patient family Jodie [phone] and Andrea [phone] on multiple occasions no answer - as patient medically stable can follow up out patient for arthrocentesis as unable to obtain consent at this time     # Psychosis  =Could have been precipitated by steroids? which has been discontinued since 10/16. Pt is not combative or aggressive toward staff, but she has paranoia and refuses to take meds. Suspects degree of dementia.  - Zyprexa PRN for agitation, though has not required any doses  - pt was evaluated by Psych, does not have decision making capacity  - spoke with family, daughter Yanira, and son Andrea, both agree that patient to be discharged to rehab.     # CAD (coronary artery disease)  - With history of cardiac stents, continue medical management with ASA, Plavix and statin.     # Type 2 diabetes mellitus  - Ha1c 6.1, no home meds. FSG acceptable- does not need q6hrs FSG.     # HLD (hyperlipidemia)  - LFT's stable, continue statin, though pt is refusing to take meds.    # Hypertension  - Hold lisinopril for concern of possible angioedema  - BP normotensive, on metoprolol but refuses to take it. If persistently >170 systolic, can use IV labetalol.     # CKD (chronic kidney disease) stage 3  - baseline creatinine 1.2-1.3  - Monitor renal function, electrolytes and urine output.     # Rheumatoid arthritis  - Supportive care, analgesia PRN.     # DVT prophylaxis  - Heparin sq.

## 2019-10-24 NOTE — PROGRESS NOTE ADULT - SUBJECTIVE AND OBJECTIVE BOX
PATRICK ADKINS    INTERVAL HPI/OVERNIGHT EVENTS: Pt seen/examined. Continues to have swelling, effusion, extreme tenderness to touch and mild warmth over R knee. Remainder of joints without synovitis. Spoke with daughter Jodie at [phone] who gave verbal consent for diagnostic arthrocentesis of R knee after discussion of R/B/A. Discussed diagnostic and therapeutic reasons for procedure with patient and she was in agreement.    Procedure - R knee arthrocentesis, steroid injection    Site identified and marked, sterilized with betadyne, anesthetized with topical ethyl chloride. 21g needle inserted via medial approach, 15ml of straw colored synovial fluid aspirated and sent for analysis. 40mg depomedrol and 2mL lidocaine injected into joint. Pt tolerated procedure well, improved pain and resolved effusion post-procedure, no blood loss. Pressure applied, bandaid applied. Pt advised to notify RN if any worsening pain, bleeding or warmth at site.     MEDICATIONS  (STANDING):  aspirin enteric coated 81 milliGRAM(s) Oral daily  atorvastatin 40 milliGRAM(s) Oral at bedtime  clopidogrel Tablet 75 milliGRAM(s) Oral daily  dextrose 5%. 1000 milliLiter(s) (50 mL/Hr) IV Continuous <Continuous>  dextrose 50% Injectable 12.5 Gram(s) IV Push once  dextrose 50% Injectable 25 Gram(s) IV Push once  dextrose 50% Injectable 25 Gram(s) IV Push once  docusate sodium 100 milliGRAM(s) Oral three times a day  heparin  Injectable 5000 Unit(s) SubCutaneous every 8 hours  lidocaine   Patch 1 Patch Transdermal daily  metoprolol tartrate 12.5 milliGRAM(s) Oral two times a day    MEDICATIONS  (PRN):  acetaminophen   Tablet .. 650 milliGRAM(s) Oral every 6 hours PRN Mild Pain (1 - 3)  dextrose 40% Gel 15 Gram(s) Oral once PRN Blood Glucose LESS THAN 70 milliGRAM(s)/deciliter  glucagon  Injectable 1 milliGRAM(s) IntraMuscular once PRN Glucose LESS THAN 70 milligrams/deciliter  OLANZapine Injectable 5 milliGRAM(s) IntraMuscular every 6 hours PRN Agitation  oxycodone    5 mG/acetaminophen 325 mG 1 Tablet(s) Oral every 6 hours PRN Severe Pain (7 - 10)  senna 2 Tablet(s) Oral at bedtime PRN Constipation  traMADol 50 milliGRAM(s) Oral every 8 hours PRN Moderate Pain (4 - 6)      Allergies --Berries (Angioedema (Mild to Mod))  sulfa drugs (Rash)    Review of Systems:   General: No fevers/chills, no fatigue  HEENT: No blurry vision, dysphagia, or odynophagia  CVS: No CP/palpitations  Resp: No SOB/wheezing  GI: No N/V/C/D/abdominal pain  MSK: see HPI  Skin: No new rashes  Neuro: No headaches      Vital Signs Last 24 Hrs  T(C): 36.8 (24 Oct 2019 15:43), Max: 36.9 (23 Oct 2019 21:32)  T(F): 98.2 (24 Oct 2019 15:43), Max: 98.5 (23 Oct 2019 21:32)  HR: 66 (24 Oct 2019 15:43) (63 - 71)  BP: 122/62 (24 Oct 2019 15:43) (122/62 - 129/66)  RR: 16 (24 Oct 2019 15:43) (14 - 16)  SpO2: 98% (24 Oct 2019 15:43) (98% - 99%)    Physical Exam: VS reviewed - afebrile  General: No apparent distress, AAOx 2 but lacks insight   HEENT: EOMI, MMM  CVS: +S1/S2, RRR  Resp: CTA b/l  GI: Soft, NT/ND  MSK: + TTP and fullness over popliteal region of R knee, larger anterior effusion with mild warmth today and tenderness to light touch, knee in fully flexed position and pt would not cooperate to extend joint for full exam. No other synovitis, ROM intact. No tophi appreciated. OA changes in hands, no chronic deformity consistent with RA appreciated.   Neuro: pt not cooperative with strength or ROM exams   Skin: no  rashes    LABS: recent labs reviewed. Of note RF negative but CCP low positive     RADIOLOGY & ADDITIONAL TESTS: no new imaging since last evaluation

## 2019-10-24 NOTE — DISCHARGE NOTE PROVIDER - CARE PROVIDERS DIRECT ADDRESSES
[email or web address]

## 2019-10-24 NOTE — DISCHARGE NOTE PROVIDER - CARE PROVIDER_API CALL
Moose Kaye)  Medicine  [facility]  [facility]  [address], [address]  [place] [address]  Phone: [phone]  Fax: [phone]  Follow Up Time:     Marilou Terry)  Internal Medicine; [place] [address]  Phone: [phone]  Fax: [phone]  Follow Up Time:

## 2019-10-24 NOTE — DISCHARGE NOTE PROVIDER - HOSPITAL COURSE
82 yo female with HTN, HLD, CAD, Type 2 DM, RA, CKD stage 3, Obesity presents from home for worsening right knee pain, swelling and inability to ambulate        # Lip swelling    - Allergic reaction to food  (Strawberries Blackberries, cranberry, colchicine?) vs. angioedema (pt was on  ACEi). Lip swelling has resolved    - Hold ACEi and colchicine.         # Right knee pain    - Chronic, continue supportive care. CT R knee demonstrates arthritis with effusion in the setting of elevated uric acid.      - colchicine has been discontinued for concern of allergic reaction    - prednisone has been discontinued due to low suspicion for acute gout, and may worsen paranoia    - Dr Terry rheumatology and myself tried to contact patient family Jodie [phone] and Andrea [phone] on multiple occasions no answer - as patient medically stable can follow up out patient for arthrocentesis as unable to obtain consent at this time         # Psychosis    =Could have been precipitated by steroids? which has been discontinued since 10/16. Pt is not combative or aggressive toward staff, but she has paranoia and refuses to take meds. Suspects degree of dementia.    - Zyprexa PRN for agitation, though has not required any doses    - pt was evaluated by Psych, does not have decision making capacity    - spoke with family, daughter Yanira, and son Andrea, both agree that patient to be discharged to rehab.         # CAD (coronary artery disease)    - With history of cardiac stents, continue medical management with ASA, Plavix and statin.         # Type 2 diabetes mellitus    - Ha1c 6.1, no home meds. FSG acceptable- does not need q6hrs FSG.         # HLD (hyperlipidemia)    - LFT's stable, continue statin, though pt is refusing to take meds.        # Hypertension    - Hold lisinopril for concern of possible angioedema    - BP normotensive, on metoprolol but refuses to take it. If persistently >170 systolic, can use IV labetalol.         # CKD (chronic kidney disease) stage 3    - baseline creatinine 1.2-1.3    - Monitor renal function, electrolytes and urine output.         # Rheumatoid arthritis    - Supportive care, analgesia PRN.         # DVT prophylaxis    - Heparin sq.        Dr Terry rheumatology and myself tried to contact patient family Jodie [phone] and Andrea [phone] on multiple occasions no answer - as patient medically stable can follow up out patient for arthrocentesis as unable to obtain consent at this time     patient medically stable for discharge at this time         has not followed up with pcp and no showed for Dr Kaye group in over year 82 yo female with HTN, HLD, CAD, Type 2 DM, RA, CKD stage 3, Obesity presents from home for worsening right knee pain, swelling and inability to ambulate        # Lip swelling    - Allergic reaction to food  (Strawberries Blackberries, cranberry, colchicine?) vs. angioedema (pt was on  ACEi). Lip swelling has resolved    - Hold ACEi and colchicine.         # Right knee pain    - Chronic, continue supportive care. CT R knee demonstrates arthritis with effusion in the setting of elevated uric acid.      - colchicine has been discontinued for concern of allergic reaction    - prednisone has been discontinued due to low suspicion for acute gout, and may worsen paranoia    -s/p arthrocentesis via Rheum        # Psychosis    -Could have been precipitated by steroids? which has been discontinued since 10/16. Pt is not combative or aggressive toward staff, but she has paranoia and refuses to take meds. Suspects degree of dementia.    - Zyprexa PRN for agitation, though has not required any doses    - pt was evaluated by Psych, does not have decision making capacity    - spoke with family, daughter Yanira, and son Andrea, both agree that patient to be discharged to rehab.         # CAD (coronary artery disease)    - With history of cardiac stents, continue medical management with ASA, Plavix and statin.         # Type 2 diabetes mellitus    - Ha1c 6.1, no home meds. FSG acceptable- does not need q6hrs FSG.         # HLD (hyperlipidemia)    - LFT's stable, continue statin, though pt is refusing to take meds.        # Hypertension    - Hold lisinopril for concern of possible angioedema    - BP normotensive, on metoprolol but refuses to take it. If persistently >170 systolic, can use IV labetalol.         # CKD (chronic kidney disease) stage 3    - baseline creatinine 1.2-1.3    - Monitor renal function, electrolytes and urine output.         # Rheumatoid arthritis    - Supportive care, analgesia PRN.         # DVT prophylaxis    - Heparin sq.        has not followed up with pcp and no showed for Dr Mikki conti in over year

## 2019-10-25 ENCOUNTER — TRANSCRIPTION ENCOUNTER (OUTPATIENT)
Age: 84
End: 2019-10-25

## 2019-10-25 VITALS
RESPIRATION RATE: 16 BRPM | HEART RATE: 61 BPM | DIASTOLIC BLOOD PRESSURE: 54 MMHG | OXYGEN SATURATION: 98 % | TEMPERATURE: 98 F | SYSTOLIC BLOOD PRESSURE: 108 MMHG

## 2019-10-25 LAB
B PERT IGG+IGM PNL SER: ABNORMAL
COLOR FLD: YELLOW — SIGNIFICANT CHANGE UP
EOSINOPHIL # FLD: 0 % — SIGNIFICANT CHANGE UP
FLUID INTAKE SUBSTANCE CLASS: SIGNIFICANT CHANGE UP
FLUID SEGMENTED GRANULOCYTES: 89 % — SIGNIFICANT CHANGE UP
FOLATE+VIT B12 SERBLD-IMP: 0 % — SIGNIFICANT CHANGE UP
LYMPHOCYTES # FLD: 1 % — SIGNIFICANT CHANGE UP
MESOTHL CELL # FLD: 0 % — SIGNIFICANT CHANGE UP
MONOS+MACROS # FLD: 10 % — SIGNIFICANT CHANGE UP
NRBC # FLD: 0 — SIGNIFICANT CHANGE UP
OTHER CELLS FLD MANUAL: 0 % — SIGNIFICANT CHANGE UP
RCV VOL RI: 5000 /UL — HIGH (ref 0–0)
SYNOVIAL CRYSTALS CLARITY: ABNORMAL
SYNOVIAL CRYSTALS COLOR: YELLOW
SYNOVIAL CRYSTALS ID: ABNORMAL
SYNOVIAL CRYSTALS TUBE: SIGNIFICANT CHANGE UP
TOTAL NUCLEATED CELL COUNT, BODY FLUID: SIGNIFICANT CHANGE UP /UL
TUBE TYPE: SIGNIFICANT CHANGE UP

## 2019-10-25 PROCEDURE — 99232 SBSQ HOSP IP/OBS MODERATE 35: CPT

## 2019-10-25 PROCEDURE — 99231 SBSQ HOSP IP/OBS SF/LOW 25: CPT

## 2019-10-25 RX ADMIN — Medication 12.5 MILLIGRAM(S): at 06:51

## 2019-10-25 RX ADMIN — LIDOCAINE 1 PATCH: 4 CREAM TOPICAL at 18:01

## 2019-10-25 RX ADMIN — LIDOCAINE 1 PATCH: 4 CREAM TOPICAL at 00:06

## 2019-10-25 RX ADMIN — LIDOCAINE 1 PATCH: 4 CREAM TOPICAL at 11:47

## 2019-10-25 RX ADMIN — Medication 81 MILLIGRAM(S): at 11:47

## 2019-10-25 RX ADMIN — Medication 12.5 MILLIGRAM(S): at 17:11

## 2019-10-25 RX ADMIN — CLOPIDOGREL BISULFATE 75 MILLIGRAM(S): 75 TABLET, FILM COATED ORAL at 11:47

## 2019-10-25 RX ADMIN — Medication 100 MILLIGRAM(S): at 06:51

## 2019-10-25 RX ADMIN — Medication 1 MILLIGRAM(S): at 16:30

## 2019-10-25 RX ADMIN — Medication 0.5 MILLIGRAM(S): at 14:42

## 2019-10-25 NOTE — PROGRESS NOTE ADULT - SUBJECTIVE AND OBJECTIVE BOX
Patient is a 83y old  Female who presents with a chief complaint of Knee pain/unable to ambulate (24 Oct 2019 16:42)      Patient seen and examined at bedside. No overnight events reported.     ALLERGIES:  Berries (Angioedema (Mild to Mod))  sulfa drugs (Rash)    MEDICATIONS  (STANDING):  aspirin enteric coated 81 milliGRAM(s) Oral daily  atorvastatin 40 milliGRAM(s) Oral at bedtime  clopidogrel Tablet 75 milliGRAM(s) Oral daily  dextrose 5%. 1000 milliLiter(s) (50 mL/Hr) IV Continuous <Continuous>  dextrose 50% Injectable 12.5 Gram(s) IV Push once  dextrose 50% Injectable 25 Gram(s) IV Push once  dextrose 50% Injectable 25 Gram(s) IV Push once  docusate sodium 100 milliGRAM(s) Oral three times a day  heparin  Injectable 5000 Unit(s) SubCutaneous every 8 hours  lidocaine   Patch 1 Patch Transdermal daily  metoprolol tartrate 12.5 milliGRAM(s) Oral two times a day    MEDICATIONS  (PRN):  acetaminophen   Tablet .. 650 milliGRAM(s) Oral every 6 hours PRN Mild Pain (1 - 3)  dextrose 40% Gel 15 Gram(s) Oral once PRN Blood Glucose LESS THAN 70 milliGRAM(s)/deciliter  glucagon  Injectable 1 milliGRAM(s) IntraMuscular once PRN Glucose LESS THAN 70 milligrams/deciliter  OLANZapine Injectable 5 milliGRAM(s) IntraMuscular every 6 hours PRN Agitation  oxycodone    5 mG/acetaminophen 325 mG 1 Tablet(s) Oral every 6 hours PRN Severe Pain (7 - 10)  senna 2 Tablet(s) Oral at bedtime PRN Constipation  traMADol 50 milliGRAM(s) Oral every 8 hours PRN Moderate Pain (4 - 6)    Vital Signs Last 24 Hrs  T(F): 98.2 (24 Oct 2019 21:42), Max: 98.2 (24 Oct 2019 15:43)  HR: 59 (25 Oct 2019 06:49) (52 - 66)  BP: 108/61 (25 Oct 2019 06:49) (108/61 - 125/52)  RR: 16 (24 Oct 2019 21:42) (16 - 16)  SpO2: 98% (24 Oct 2019 21:42) (98% - 98%)  I&O's Summary    24 Oct 2019 07:01  -  25 Oct 2019 07:00  --------------------------------------------------------  IN: 1240 mL / OUT: 0 mL / NET: 1240 mL    PHYSICAL EXAM:  General: NAD, well dressed, well nourished  Head: NT/AC  ENT: MMM, no thrush  Neck: Supple, No JVD  Lungs: Clear to auscultation bilaterally, no wheezing, rales, or rhonchi  Cardio: +S1/S2; no edema b/l le  Abdomen: Normoactive BS, Abdomen soft, nontender, nondistended;   Extremities: No calf tenderness, no clubbing or  cyanosis. Swelling of right knee mostly posterior      LABS:  No new labs      10-15 [id number] 6.1    Cultures  Culture - Body Fluid with Gram Stain (collected 24 Oct 2019 16:42)  Source: .Body Fluid Synovial Fluid  Gram Stain (24 Oct 2019 23:46):    polymorphonuclear leukocytes seen    No organisms seen    by cytocentrifuge

## 2019-10-25 NOTE — DISCHARGE NOTE NURSING/CASE MANAGEMENT/SOCIAL WORK - PATIENT PORTAL LINK FT
You can access the FollowMyHealth Patient Portal offered by [facility] by registering at the following website: [email or web address]. By joining [facility]’s FollowMyHealth portal, you will also be able to view your health information using other applications (apps) compatible with our system.

## 2019-10-25 NOTE — PROGRESS NOTE ADULT - REASON FOR ADMISSION
Knee pain/unable to ambulate

## 2019-10-25 NOTE — PROGRESS NOTE ADULT - SUBJECTIVE AND OBJECTIVE BOX
PATRICK ADKINS    INTERVAL HPI/OVERNIGHT EVENTS: Pt seen/examined. R knee without recurrent effusion, no warmth, less tenderness over joint and improved ROM. No other painful joints.     MEDICATIONS  (STANDING):  aspirin enteric coated 81 milliGRAM(s) Oral daily  atorvastatin 40 milliGRAM(s) Oral at bedtime  clopidogrel Tablet 75 milliGRAM(s) Oral daily  dextrose 5%. 1000 milliLiter(s) (50 mL/Hr) IV Continuous <Continuous>  dextrose 50% Injectable 12.5 Gram(s) IV Push once  dextrose 50% Injectable 25 Gram(s) IV Push once  dextrose 50% Injectable 25 Gram(s) IV Push once  docusate sodium 100 milliGRAM(s) Oral three times a day  heparin  Injectable 5000 Unit(s) SubCutaneous every 8 hours  lidocaine   Patch 1 Patch Transdermal daily  metoprolol tartrate 12.5 milliGRAM(s) Oral two times a day    MEDICATIONS  (PRN):  acetaminophen   Tablet .. 650 milliGRAM(s) Oral every 6 hours PRN Mild Pain (1 - 3)  dextrose 40% Gel 15 Gram(s) Oral once PRN Blood Glucose LESS THAN 70 milliGRAM(s)/deciliter  glucagon  Injectable 1 milliGRAM(s) IntraMuscular once PRN Glucose LESS THAN 70 milligrams/deciliter  OLANZapine Injectable 5 milliGRAM(s) IntraMuscular every 6 hours PRN Agitation  oxycodone    5 mG/acetaminophen 325 mG 1 Tablet(s) Oral every 6 hours PRN Severe Pain (7 - 10)  senna 2 Tablet(s) Oral at bedtime PRN Constipation  traMADol 50 milliGRAM(s) Oral every 8 hours PRN Moderate Pain (4 - 6)      Allergies --Berries (Angioedema (Mild to Mod))  sulfa drugs (Rash)    Review of Systems:   General: No fevers/chills, no fatigue  HEENT: No blurry vision, dysphagia, or odynophagia  CVS: No CP/palpitations  Resp: No SOB/wheezing  GI: No N/V/C/D/abdominal pain  MSK: see HPI  Skin: No new rashes  Neuro: No headaches      Vital Signs Last 24 Hrs  T(C): 36.8 (24 Oct 2019 15:43), Max: 36.9 (23 Oct 2019 21:32)  T(F): 98.2 (24 Oct 2019 15:43), Max: 98.5 (23 Oct 2019 21:32)  HR: 66 (24 Oct 2019 15:43) (63 - 71)  BP: 122/62 (24 Oct 2019 15:43) (122/62 - 129/66)  RR: 16 (24 Oct 2019 15:43) (14 - 16)  SpO2: 98% (24 Oct 2019 15:43) (98% - 99%)    Physical Exam: VS reviewed - afebrile  General: No apparent distress, AAOx 2 but lacks insight   HEENT: EOMI, MMM  CVS: +S1/S2, RRR  Resp: CTA b/l  GI: Soft, NT/ND  MSK: + improved TTP and anterior effusion over R knee. No other synovitis, ROM intact. No tophi appreciated. OA changes in hands, no chronic deformity consistent with RA appreciated.    Skin: no  rashes    LABS: recent labs reviewed. Of note RF negative but CCP low positive. Synovial fluid -- + MSU crystals, negative gram stain     RADIOLOGY & ADDITIONAL TESTS: no new imaging since last evaluation

## 2019-10-25 NOTE — PROGRESS NOTE ADULT - PROVIDER SPECIALTY LIST ADULT
Critical Care
Hospitalist
Psychiatry
Psychiatry
Rheumatology
Hospitalist

## 2019-10-25 NOTE — PROGRESS NOTE ADULT - ASSESSMENT
82 yo F with prior dx of ?RA however doesn't appear to clinically have symptoms, prior hx of gout with flare approx 1 year ago, admitted with severe R knee pain limiting ambulation. Appears to have exam more consistent with crystalline arthritis and s/p arthrocentesis confirming crystalline arthritis flare. S/p steroid injection with significant improvement in pain and ROM. Pending discharge to rehab.    - will f/u cx, gram stain negative thus far   - no further treatment for gout flare   - c/w pain management with tylenol and lidoderm patches, cold compresses  - CT consistent with advanced OA in R knee, ?area of lucency, which will be f/u OP with MRI   - stable for discharge from rheum perspective. Can f/u OP for further gout management -- call [phone] for appointment  - will sign off, please re-consult if needed

## 2019-10-25 NOTE — PROGRESS NOTE ADULT - ASSESSMENT
84 yo female with HTN, HLD, CAD, Type 2 DM, RA, CKD stage 3, Obesity presents from home for worsening right knee pain, swelling and inability to ambulate    # Lip swelling  - Allergic reaction to food  (Strawberries Blackberries, cranberry, colchicine?) vs. angioedema (pt was on  ACEi). Lip swelling has resolved  - Hold ACEi and colchicine.     # Right knee pain  - Chronic, continue supportive care. CT R knee demonstrates arthritis with effusion in the setting of elevated uric acid.    - colchicine has been discontinued for concern of allergic reaction  - prednisone has been discontinued due to low suspicion for acute gout, and may worsen paranoia  -s/p arthrocentesis via Rheum    # Psychosis  -Could have been precipitated by steroids? which has been discontinued since 10/16. Pt is not combative or aggressive toward staff, but she has paranoia and refuses to take meds. Suspects degree of dementia.  - Zyprexa PRN for agitation, though has not required any doses  - pt was evaluated by Psych, does not have decision making capacity  - spoke with family, daughter Yanira, and son Andrea, both agree that patient to be discharged to rehab.     # CAD (coronary artery disease)  - With history of cardiac stents, continue medical management with ASA, Plavix and statin.     # Type 2 diabetes mellitus  - Ha1c 6.1, no home meds. FSG acceptable- does not need q6hrs FSG.     # HLD (hyperlipidemia)  - LFT's stable, continue statin, though pt is refusing to take meds.    # Hypertension  - Hold lisinopril for concern of possible angioedema  - BP normotensive, on metoprolol but refuses to take it. If persistently >170 systolic, can use IV labetalol.     # CKD (chronic kidney disease) stage 3  - baseline creatinine 1.2-1.3  - Monitor renal function, electrolytes and urine output.     # Rheumatoid arthritis  - Supportive care, analgesia PRN.     # DVT prophylaxis  - Heparin sq.

## 2019-10-28 ENCOUNTER — INBOUND DOCUMENT (OUTPATIENT)
Age: 84
End: 2019-10-28

## 2019-11-07 LAB
CULTURE RESULTS: SIGNIFICANT CHANGE UP
SPECIMEN SOURCE: SIGNIFICANT CHANGE UP

## 2019-11-08 ENCOUNTER — APPOINTMENT (OUTPATIENT)
Dept: CT IMAGING | Facility: HOSPITAL | Age: 84
End: 2019-11-08
Payer: MEDICARE

## 2019-11-08 ENCOUNTER — OUTPATIENT (OUTPATIENT)
Dept: OUTPATIENT SERVICES | Facility: HOSPITAL | Age: 84
LOS: 1 days | End: 2019-11-08
Payer: MEDICARE

## 2019-11-08 DIAGNOSIS — Z00.8 ENCOUNTER FOR OTHER GENERAL EXAMINATION: ICD-10-CM

## 2019-11-08 PROCEDURE — 71250 CT THORAX DX C-: CPT | Mod: 26

## 2019-11-08 PROCEDURE — 70450 CT HEAD/BRAIN W/O DYE: CPT | Mod: 26

## 2019-11-08 PROCEDURE — 71250 CT THORAX DX C-: CPT

## 2019-11-08 PROCEDURE — 70450 CT HEAD/BRAIN W/O DYE: CPT

## 2019-11-12 PROCEDURE — 84550 ASSAY OF BLOOD/URIC ACID: CPT

## 2019-11-12 PROCEDURE — 84443 ASSAY THYROID STIM HORMONE: CPT

## 2019-11-12 PROCEDURE — 84165 PROTEIN E-PHORESIS SERUM: CPT

## 2019-11-12 PROCEDURE — 84439 ASSAY OF FREE THYROXINE: CPT

## 2019-11-12 PROCEDURE — 71045 X-RAY EXAM CHEST 1 VIEW: CPT

## 2019-11-12 PROCEDURE — 99284 EMERGENCY DEPT VISIT MOD MDM: CPT | Mod: 25

## 2019-11-12 PROCEDURE — 84155 ASSAY OF PROTEIN SERUM: CPT

## 2019-11-12 PROCEDURE — 84100 ASSAY OF PHOSPHORUS: CPT

## 2019-11-12 PROCEDURE — 89051 BODY FLUID CELL COUNT: CPT

## 2019-11-12 PROCEDURE — 80048 BASIC METABOLIC PNL TOTAL CA: CPT

## 2019-11-12 PROCEDURE — 97530 THERAPEUTIC ACTIVITIES: CPT

## 2019-11-12 PROCEDURE — 93971 EXTREMITY STUDY: CPT

## 2019-11-12 PROCEDURE — 85652 RBC SED RATE AUTOMATED: CPT

## 2019-11-12 PROCEDURE — 36415 COLL VENOUS BLD VENIPUNCTURE: CPT

## 2019-11-12 PROCEDURE — 87070 CULTURE OTHR SPECIMN AEROBIC: CPT

## 2019-11-12 PROCEDURE — 86780 TREPONEMA PALLIDUM: CPT

## 2019-11-12 PROCEDURE — 86140 C-REACTIVE PROTEIN: CPT

## 2019-11-12 PROCEDURE — 86431 RHEUMATOID FACTOR QUANT: CPT

## 2019-11-12 PROCEDURE — 87205 SMEAR GRAM STAIN: CPT

## 2019-11-12 PROCEDURE — 93005 ELECTROCARDIOGRAM TRACING: CPT

## 2019-11-12 PROCEDURE — 83735 ASSAY OF MAGNESIUM: CPT

## 2019-11-12 PROCEDURE — 87075 CULTR BACTERIA EXCEPT BLOOD: CPT

## 2019-11-12 PROCEDURE — 89060 EXAM SYNOVIAL FLUID CRYSTALS: CPT

## 2019-11-12 PROCEDURE — 97116 GAIT TRAINING THERAPY: CPT

## 2019-11-12 PROCEDURE — 86200 CCP ANTIBODY: CPT

## 2019-11-12 PROCEDURE — 96372 THER/PROPH/DIAG INJ SC/IM: CPT

## 2019-11-12 PROCEDURE — 80053 COMPREHEN METABOLIC PANEL: CPT

## 2019-11-12 PROCEDURE — 97161 PT EVAL LOW COMPLEX 20 MIN: CPT

## 2019-11-12 PROCEDURE — 73700 CT LOWER EXTREMITY W/O DYE: CPT

## 2019-11-12 PROCEDURE — 85027 COMPLETE CBC AUTOMATED: CPT

## 2019-11-12 PROCEDURE — 82962 GLUCOSE BLOOD TEST: CPT

## 2019-11-12 PROCEDURE — 86334 IMMUNOFIX E-PHORESIS SERUM: CPT

## 2019-11-12 PROCEDURE — 83036 HEMOGLOBIN GLYCOSYLATED A1C: CPT

## 2019-11-12 PROCEDURE — 73562 X-RAY EXAM OF KNEE 3: CPT

## 2019-12-16 NOTE — ED ADULT NURSE NOTE - CINV DISCH TEACH PARTICIP
64 year old white male returns for surveillance colonoscopy.  He has history of left hemicolectomy with low anterior anastomosis 10 years ago.  He also has had advanced adenoma.  His last colonoscopy by Dr. James was unremarkable in 2013.  No major interval illness.  No family history of colon cancer or polyps. Patient

## 2019-12-16 NOTE — ED ADULT TRIAGE NOTE - HEIGHT IN CM
I spoke with patient offered to schedule her on Dec 30th with Dr Kramer unable to take appointment due to conflicting appt  Patient aware I will call her back with another date  [phone]

## 2019-12-17 ENCOUNTER — APPOINTMENT (OUTPATIENT)
Dept: NUCLEAR MEDICINE | Facility: CLINIC | Age: 84
End: 2019-12-17
Payer: MEDICARE

## 2019-12-17 ENCOUNTER — OUTPATIENT (OUTPATIENT)
Dept: OUTPATIENT SERVICES | Facility: HOSPITAL | Age: 84
LOS: 1 days | End: 2019-12-17
Payer: MEDICARE

## 2019-12-17 DIAGNOSIS — Z00.8 ENCOUNTER FOR OTHER GENERAL EXAMINATION: ICD-10-CM

## 2019-12-17 PROCEDURE — 78815 PET IMAGE W/CT SKULL-THIGH: CPT | Mod: 26,PI

## 2019-12-17 PROCEDURE — A9552: CPT

## 2019-12-17 PROCEDURE — 78815 PET IMAGE W/CT SKULL-THIGH: CPT

## 2019-12-24 NOTE — ED ADULT NURSE NOTE - DISCHARGE DATE/TIME
decreased strength/decreased ROM/impaired coordination/impaired balance/decreased endurance 28-May-2019 04:26

## 2020-01-06 ENCOUNTER — APPOINTMENT (OUTPATIENT)
Dept: CT IMAGING | Facility: HOSPITAL | Age: 85
End: 2020-01-06

## 2020-03-26 ENCOUNTER — INPATIENT (INPATIENT)
Facility: HOSPITAL | Age: 85
LOS: 9 days | DRG: 871 | End: 2020-04-05
Attending: INTERNAL MEDICINE | Admitting: INTERNAL MEDICINE
Payer: MEDICARE

## 2020-03-26 VITALS — HEART RATE: 110 BPM | OXYGEN SATURATION: 94 % | TEMPERATURE: 98 F | WEIGHT: 149.91 LBS | RESPIRATION RATE: 18 BRPM

## 2020-03-26 DIAGNOSIS — A41.9 SEPSIS, UNSPECIFIED ORGANISM: ICD-10-CM

## 2020-03-26 LAB
ALBUMIN SERPL ELPH-MCNC: 2.5 G/DL — LOW (ref 3.3–5)
ALP SERPL-CCNC: 284 U/L — HIGH (ref 40–120)
ALT FLD-CCNC: 13 U/L — SIGNIFICANT CHANGE UP (ref 10–45)
ANION GAP SERPL CALC-SCNC: 22 MMOL/L — HIGH (ref 5–17)
APTT BLD: 25.1 SEC — LOW (ref 27.5–36.3)
AST SERPL-CCNC: 39 U/L — SIGNIFICANT CHANGE UP (ref 10–40)
BASOPHILS # BLD AUTO: 0.01 K/UL — SIGNIFICANT CHANGE UP (ref 0–0.2)
BASOPHILS NFR BLD AUTO: 0.1 % — SIGNIFICANT CHANGE UP (ref 0–2)
BILIRUB SERPL-MCNC: 0.4 MG/DL — SIGNIFICANT CHANGE UP (ref 0.2–1.2)
BUN SERPL-MCNC: 110 MG/DL — HIGH (ref 7–23)
CALCIUM SERPL-MCNC: 9.1 MG/DL — SIGNIFICANT CHANGE UP (ref 8.4–10.5)
CHLORIDE SERPL-SCNC: 117 MMOL/L — HIGH (ref 96–108)
CO2 SERPL-SCNC: 15 MMOL/L — LOW (ref 22–31)
CREAT SERPL-MCNC: 5.08 MG/DL — HIGH (ref 0.5–1.3)
EOSINOPHIL # BLD AUTO: 0.01 K/UL — SIGNIFICANT CHANGE UP (ref 0–0.5)
EOSINOPHIL NFR BLD AUTO: 0.1 % — SIGNIFICANT CHANGE UP (ref 0–6)
GLUCOSE SERPL-MCNC: 151 MG/DL — HIGH (ref 70–99)
HCT VFR BLD CALC: 40.3 % — SIGNIFICANT CHANGE UP (ref 34.5–45)
HGB BLD-MCNC: 12.7 G/DL — SIGNIFICANT CHANGE UP (ref 11.5–15.5)
IMM GRANULOCYTES NFR BLD AUTO: 0.6 % — SIGNIFICANT CHANGE UP (ref 0–1.5)
INR BLD: 1.2 RATIO — HIGH (ref 0.88–1.16)
LACTATE SERPL-SCNC: 3.7 MMOL/L — HIGH (ref 0.7–2)
LACTATE SERPL-SCNC: 4 MMOL/L — CRITICAL HIGH (ref 0.7–2)
LYMPHOCYTES # BLD AUTO: 1.78 K/UL — SIGNIFICANT CHANGE UP (ref 1–3.3)
LYMPHOCYTES # BLD AUTO: 21.6 % — SIGNIFICANT CHANGE UP (ref 13–44)
MCHC RBC-ENTMCNC: 28.5 PG — SIGNIFICANT CHANGE UP (ref 27–34)
MCHC RBC-ENTMCNC: 31.5 GM/DL — LOW (ref 32–36)
MCV RBC AUTO: 90.4 FL — SIGNIFICANT CHANGE UP (ref 80–100)
MONOCYTES # BLD AUTO: 0.44 K/UL — SIGNIFICANT CHANGE UP (ref 0–0.9)
MONOCYTES NFR BLD AUTO: 5.3 % — SIGNIFICANT CHANGE UP (ref 2–14)
NEUTROPHILS # BLD AUTO: 5.96 K/UL — SIGNIFICANT CHANGE UP (ref 1.8–7.4)
NEUTROPHILS NFR BLD AUTO: 72.3 % — SIGNIFICANT CHANGE UP (ref 43–77)
NRBC # BLD: 0 /100 WBCS — SIGNIFICANT CHANGE UP (ref 0–0)
PLATELET # BLD AUTO: 183 K/UL — SIGNIFICANT CHANGE UP (ref 150–400)
POTASSIUM SERPL-MCNC: 3.9 MMOL/L — SIGNIFICANT CHANGE UP (ref 3.5–5.3)
POTASSIUM SERPL-SCNC: 3.9 MMOL/L — SIGNIFICANT CHANGE UP (ref 3.5–5.3)
PROT SERPL-MCNC: 8.7 G/DL — HIGH (ref 6–8.3)
PROTHROM AB SERPL-ACNC: 13.5 SEC — HIGH (ref 10–12.9)
RBC # BLD: 4.46 M/UL — SIGNIFICANT CHANGE UP (ref 3.8–5.2)
RBC # FLD: 20.4 % — HIGH (ref 10.3–14.5)
SODIUM SERPL-SCNC: 154 MMOL/L — HIGH (ref 135–145)
WBC # BLD: 8.25 K/UL — SIGNIFICANT CHANGE UP (ref 3.8–10.5)
WBC # FLD AUTO: 8.25 K/UL — SIGNIFICANT CHANGE UP (ref 3.8–10.5)

## 2020-03-26 PROCEDURE — 93010 ELECTROCARDIOGRAM REPORT: CPT

## 2020-03-26 PROCEDURE — 71045 X-RAY EXAM CHEST 1 VIEW: CPT | Mod: 26

## 2020-03-26 PROCEDURE — 99223 1ST HOSP IP/OBS HIGH 75: CPT

## 2020-03-26 PROCEDURE — 99285 EMERGENCY DEPT VISIT HI MDM: CPT | Mod: 25

## 2020-03-26 PROCEDURE — 36556 INSERT NON-TUNNEL CV CATH: CPT

## 2020-03-26 RX ORDER — SODIUM CHLORIDE 9 MG/ML
1000 INJECTION INTRAMUSCULAR; INTRAVENOUS; SUBCUTANEOUS ONCE
Refills: 0 | Status: DISCONTINUED | OUTPATIENT
Start: 2020-03-26 | End: 2020-03-26

## 2020-03-26 RX ORDER — SODIUM CHLORIDE 9 MG/ML
1000 INJECTION INTRAMUSCULAR; INTRAVENOUS; SUBCUTANEOUS ONCE
Refills: 0 | Status: COMPLETED | OUTPATIENT
Start: 2020-03-26 | End: 2020-03-26

## 2020-03-26 RX ORDER — BACLOFEN 100 %
0 POWDER (GRAM) MISCELLANEOUS
Qty: 0 | Refills: 0 | DISCHARGE

## 2020-03-26 RX ORDER — ACETAMINOPHEN 500 MG
650 TABLET ORAL ONCE
Refills: 0 | Status: COMPLETED | OUTPATIENT
Start: 2020-03-26 | End: 2020-03-26

## 2020-03-26 RX ORDER — SIMVASTATIN 20 MG/1
20 TABLET, FILM COATED ORAL
Qty: 0 | Refills: 0 | DISCHARGE

## 2020-03-26 RX ORDER — AMITRIPTYLINE HCL 25 MG
1 TABLET ORAL
Qty: 0 | Refills: 0 | DISCHARGE

## 2020-03-26 RX ORDER — LANOLIN ALCOHOL/MO/W.PET/CERES
1 CREAM (GRAM) TOPICAL
Qty: 0 | Refills: 0 | DISCHARGE

## 2020-03-26 RX ORDER — METOPROLOL TARTRATE 50 MG
1 TABLET ORAL
Qty: 0 | Refills: 0 | DISCHARGE

## 2020-03-26 RX ORDER — AMLODIPINE BESYLATE 2.5 MG/1
1 TABLET ORAL
Qty: 0 | Refills: 0 | DISCHARGE

## 2020-03-26 RX ORDER — SODIUM CHLORIDE 9 MG/ML
2100 INJECTION INTRAMUSCULAR; INTRAVENOUS; SUBCUTANEOUS ONCE
Refills: 0 | Status: COMPLETED | OUTPATIENT
Start: 2020-03-26 | End: 2020-03-26

## 2020-03-26 RX ORDER — PIPERACILLIN AND TAZOBACTAM 4; .5 G/20ML; G/20ML
3.38 INJECTION, POWDER, LYOPHILIZED, FOR SOLUTION INTRAVENOUS ONCE
Refills: 0 | Status: COMPLETED | OUTPATIENT
Start: 2020-03-26 | End: 2020-03-26

## 2020-03-26 RX ORDER — LOSARTAN POTASSIUM 100 MG/1
75 TABLET, FILM COATED ORAL
Qty: 0 | Refills: 0 | DISCHARGE

## 2020-03-26 RX ADMIN — SODIUM CHLORIDE 500 MILLILITER(S): 9 INJECTION INTRAMUSCULAR; INTRAVENOUS; SUBCUTANEOUS at 22:45

## 2020-03-26 RX ADMIN — Medication 650 MILLIGRAM(S): at 20:30

## 2020-03-26 RX ADMIN — PIPERACILLIN AND TAZOBACTAM 200 GRAM(S): 4; .5 INJECTION, POWDER, LYOPHILIZED, FOR SOLUTION INTRAVENOUS at 22:55

## 2020-03-26 RX ADMIN — Medication 0.5 MILLIGRAM(S): at 23:16

## 2020-03-26 RX ADMIN — SODIUM CHLORIDE 2100 MILLILITER(S): 9 INJECTION INTRAMUSCULAR; INTRAVENOUS; SUBCUTANEOUS at 21:30

## 2020-03-26 RX ADMIN — Medication 650 MILLIGRAM(S): at 22:00

## 2020-03-26 RX ADMIN — SODIUM CHLORIDE 2100 MILLILITER(S): 9 INJECTION INTRAMUSCULAR; INTRAVENOUS; SUBCUTANEOUS at 22:30

## 2020-03-26 NOTE — H&P ADULT - HISTORY OF PRESENT ILLNESS
as per EMS pt presents to ED from [facility] nursing home for evaluation of renal failure and abnormal labs  abnormal lab result 84 years old female with history of CKD, Anemia of chronic disease, Arthritis, GOUT, PE, CAD/HLD, right Lung Mass, sent to the ED from [facility] nursing home for evaluation of abnormal labs-+renal failure.  Pt with decreased po intake and noted to be hypotensive.  Pt received several fluid boluses in the ED -about 4 L NS. 84 years old female with history of CKD, Anemia of chronic disease, Arthritis, GOUT, PE, CAD/HLD, right Lung Mass, sacral decub, sent to the ED from [facility] nursing home for evaluation of abnormal labs-+renal failure.  Pt with decreased po intake and noted to be hypotensive. Pt was lethargic but arousable, states she doesnt have any appetite. Denies abd pain, denies diarrhea.  Pt received several fluid boluses in the ED -about 4 L NS.  No report of fever, chills. Lactate was 4.

## 2020-03-26 NOTE — H&P ADULT - ASSESSMENT
84 years old female with history of CKD, Anemia of chronic disease, Arthritis, GOUT, PE, CAD/HLD, right Lung Mass, sacral decub, presents with weakness, dec po intake, hypotension, +acute renal failure, hypernatremia, ATN  Elev Lactate - hypotension - ?sepsis   Sepsis focused exam performed  +Lung mass - ?likely malignant -pt refused bx from notes in [facility] - was eval by Pulm in the past  r/o COVID    Admit  IV hydration- was given NS due to hypotension  then will give 0.45 NaCL, D5W thereafter  D/c lopressor, d/c colchicine, avoid nephrotoxins  Empiric Ceftriaxone pending cultures  await urinalysis, ffup labs  renal sono  Clears for now  Speech/swallow eval  Nutrition eval  Palliative care consult  DVT Prophylaxis  Poor prognosis    CAPRINI SCORE [CLOT]  [x ] Age= 75 years                                              (3 Points)                   [x ] Bed bound for more than 72 hours                 (2 Points)  Total Score [ 5 ]  Caprini Score 0 - 2:  Low Risk, No VTE Prophylaxis required for most patients, encourage ambulation  Caprini Score 3 - 6:  At Risk, pharmacologic VTE prophylaxis is indicated for most patients (in the absence of a contraindication)  Caprini Score Greater than or = 7:  High Risk, pharmacologic VTE prophylaxis is indicated for most patients (in the absence of a contraindication)

## 2020-03-26 NOTE — ED ADULT TRIAGE NOTE - CHIEF COMPLAINT QUOTE
as per EMS pt presents to ED from [facility] nursing home for evaluation of renal failure and abnormal labs

## 2020-03-26 NOTE — ED PROVIDER NOTE - CLINICAL SUMMARY MEDICAL DECISION MAKING FREE TEXT BOX
pt is elderly non verbal pt from [facility] presented for evaluation of elevated Bun/Cr, pt has low grade fever and elevated Na, Bun and creatine with lactate of 4, pt was given IV fluids and started on broad spectrum abx and admitted

## 2020-03-26 NOTE — ED PROVIDER NOTE - PHYSICAL EXAMINATION
General:     NAD, well-nourished, well-appearing  Head:     NC/AT, EOMI, oral mucosa moist  Neck:     supple  Lungs:     CTA b/l, no w/r/r  CVS:     S1S2, RRR, no m/g/r  Abd:     +BS, s/nt/nd, no organomegaly  Ext:    2+ radial and pedal pulses, no c/c/e  Neuro: grossly intact

## 2020-03-26 NOTE — H&P ADULT - NSHPPHYSICALEXAM_GEN_ALL_CORE
Vital Signs (24 Hrs):  T(C): 36.7 (03-26-20 @ 19:38), Max: 36.7 (03-26-20 @ 19:38)  HR: 110 (03-26-20 @ 19:38) (110 - 110)  BP: --  RR: 18 (03-26-20 @ 19:38) (18 - 18)  SpO2: 94% (03-26-20 @ 19:38) (94% - 94%) Vital Signs (24 Hrs):  T(C): 36.7 (03-26-20 @ 19:38), Max: 36.7 (03-26-20 @ 19:38)  HR: 110 (03-26-20 @ 19:38) (110 - 110)  BP: --90/60  RR: 18 (03-26-20 @ 19:38) (18 - 18)  SpO2: 94% (03-26-20 @ 19:38) (94% - 94%)

## 2020-03-27 LAB
ANION GAP SERPL CALC-SCNC: 15 MMOL/L — SIGNIFICANT CHANGE UP (ref 5–17)
APPEARANCE UR: CLEAR — SIGNIFICANT CHANGE UP
BACTERIA # UR AUTO: NEGATIVE /HPF — SIGNIFICANT CHANGE UP
BASOPHILS # BLD AUTO: 0.01 K/UL — SIGNIFICANT CHANGE UP (ref 0–0.2)
BASOPHILS NFR BLD AUTO: 0.1 % — SIGNIFICANT CHANGE UP (ref 0–2)
BILIRUB UR-MCNC: NEGATIVE — SIGNIFICANT CHANGE UP
BUN SERPL-MCNC: 88 MG/DL — HIGH (ref 7–23)
CALCIUM SERPL-MCNC: 7.5 MG/DL — LOW (ref 8.4–10.5)
CHLORIDE SERPL-SCNC: 125 MMOL/L — HIGH (ref 96–108)
CO2 SERPL-SCNC: 16 MMOL/L — LOW (ref 22–31)
COLOR SPEC: YELLOW — SIGNIFICANT CHANGE UP
CREAT SERPL-MCNC: 4.24 MG/DL — HIGH (ref 0.5–1.3)
DIFF PNL FLD: ABNORMAL
EOSINOPHIL # BLD AUTO: 0 K/UL — SIGNIFICANT CHANGE UP (ref 0–0.5)
EOSINOPHIL NFR BLD AUTO: 0 % — SIGNIFICANT CHANGE UP (ref 0–6)
EPI CELLS # UR: SIGNIFICANT CHANGE UP
GLUCOSE SERPL-MCNC: 117 MG/DL — HIGH (ref 70–99)
GLUCOSE UR QL: NEGATIVE — SIGNIFICANT CHANGE UP
GRAM STN FLD: SIGNIFICANT CHANGE UP
GRAN CASTS # UR COMP ASSIST: ABNORMAL
HCT VFR BLD CALC: 26.5 % — LOW (ref 34.5–45)
HGB BLD-MCNC: 8.3 G/DL — LOW (ref 11.5–15.5)
IMM GRANULOCYTES NFR BLD AUTO: 0.6 % — SIGNIFICANT CHANGE UP (ref 0–1.5)
KETONES UR-MCNC: NEGATIVE — SIGNIFICANT CHANGE UP
LACTATE SERPL-SCNC: 1.3 MMOL/L — SIGNIFICANT CHANGE UP (ref 0.7–2)
LACTATE SERPL-SCNC: 1.6 MMOL/L — SIGNIFICANT CHANGE UP (ref 0.7–2)
LEUKOCYTE ESTERASE UR-ACNC: ABNORMAL
LYMPHOCYTES # BLD AUTO: 0.92 K/UL — LOW (ref 1–3.3)
LYMPHOCYTES # BLD AUTO: 12.9 % — LOW (ref 13–44)
MAGNESIUM SERPL-MCNC: 1.9 MG/DL — SIGNIFICANT CHANGE UP (ref 1.6–2.6)
MCHC RBC-ENTMCNC: 28.9 PG — SIGNIFICANT CHANGE UP (ref 27–34)
MCHC RBC-ENTMCNC: 31.3 GM/DL — LOW (ref 32–36)
MCV RBC AUTO: 92.3 FL — SIGNIFICANT CHANGE UP (ref 80–100)
MONOCYTES # BLD AUTO: 0.28 K/UL — SIGNIFICANT CHANGE UP (ref 0–0.9)
MONOCYTES NFR BLD AUTO: 3.9 % — SIGNIFICANT CHANGE UP (ref 2–14)
NEUTROPHILS # BLD AUTO: 5.88 K/UL — SIGNIFICANT CHANGE UP (ref 1.8–7.4)
NEUTROPHILS NFR BLD AUTO: 82.5 % — HIGH (ref 43–77)
NITRITE UR-MCNC: NEGATIVE — SIGNIFICANT CHANGE UP
NRBC # BLD: 0 /100 WBCS — SIGNIFICANT CHANGE UP (ref 0–0)
PH UR: 5 — SIGNIFICANT CHANGE UP (ref 5–8)
PLATELET # BLD AUTO: 113 K/UL — LOW (ref 150–400)
POTASSIUM SERPL-MCNC: 3.5 MMOL/L — SIGNIFICANT CHANGE UP (ref 3.5–5.3)
POTASSIUM SERPL-SCNC: 3.5 MMOL/L — SIGNIFICANT CHANGE UP (ref 3.5–5.3)
PROT UR-MCNC: 30 MG/DL
RBC # BLD: 2.87 M/UL — LOW (ref 3.8–5.2)
RBC # FLD: 20.8 % — HIGH (ref 10.3–14.5)
RBC CASTS # UR COMP ASSIST: ABNORMAL /HPF (ref 0–4)
SARS-COV-2 RNA SPEC QL NAA+PROBE: DETECTED
SODIUM SERPL-SCNC: 156 MMOL/L — HIGH (ref 135–145)
SP GR SPEC: 1.01 — SIGNIFICANT CHANGE UP (ref 1.01–1.02)
SPECIMEN SOURCE: SIGNIFICANT CHANGE UP
UROBILINOGEN FLD QL: NEGATIVE — SIGNIFICANT CHANGE UP
WBC # BLD: 7.13 K/UL — SIGNIFICANT CHANGE UP (ref 3.8–10.5)
WBC # FLD AUTO: 7.13 K/UL — SIGNIFICANT CHANGE UP (ref 3.8–10.5)
WBC UR QL: SIGNIFICANT CHANGE UP /HPF (ref 0–5)

## 2020-03-27 PROCEDURE — 99497 ADVNCD CARE PLAN 30 MIN: CPT

## 2020-03-27 PROCEDURE — 99223 1ST HOSP IP/OBS HIGH 75: CPT

## 2020-03-27 PROCEDURE — 99233 SBSQ HOSP IP/OBS HIGH 50: CPT

## 2020-03-27 RX ORDER — CEFTRIAXONE 500 MG/1
1000 INJECTION, POWDER, FOR SOLUTION INTRAMUSCULAR; INTRAVENOUS EVERY 24 HOURS
Refills: 0 | Status: COMPLETED | OUTPATIENT
Start: 2020-03-28 | End: 2020-03-31

## 2020-03-27 RX ORDER — ACETAMINOPHEN 500 MG
650 TABLET ORAL EVERY 8 HOURS
Refills: 0 | Status: DISCONTINUED | OUTPATIENT
Start: 2020-03-27 | End: 2020-04-05

## 2020-03-27 RX ORDER — MIDODRINE HYDROCHLORIDE 2.5 MG/1
5 TABLET ORAL EVERY 8 HOURS
Refills: 0 | Status: DISCONTINUED | OUTPATIENT
Start: 2020-03-27 | End: 2020-03-31

## 2020-03-27 RX ORDER — SODIUM CHLORIDE 9 MG/ML
1000 INJECTION, SOLUTION INTRAVENOUS
Refills: 0 | Status: DISCONTINUED | OUTPATIENT
Start: 2020-03-27 | End: 2020-03-27

## 2020-03-27 RX ORDER — ERYTHROPOIETIN 10000 [IU]/ML
10000 INJECTION, SOLUTION INTRAVENOUS; SUBCUTANEOUS
Refills: 0 | Status: DISCONTINUED | OUTPATIENT
Start: 2020-03-27 | End: 2020-03-27

## 2020-03-27 RX ORDER — SODIUM CHLORIDE 9 MG/ML
500 INJECTION INTRAMUSCULAR; INTRAVENOUS; SUBCUTANEOUS ONCE
Refills: 0 | Status: COMPLETED | OUTPATIENT
Start: 2020-03-27 | End: 2020-03-27

## 2020-03-27 RX ORDER — SODIUM CHLORIDE 9 MG/ML
1000 INJECTION, SOLUTION INTRAVENOUS
Refills: 0 | Status: COMPLETED | OUTPATIENT
Start: 2020-03-27 | End: 2020-03-27

## 2020-03-27 RX ORDER — HEPARIN SODIUM 5000 [USP'U]/ML
5000 INJECTION INTRAVENOUS; SUBCUTANEOUS EVERY 12 HOURS
Refills: 0 | Status: DISCONTINUED | OUTPATIENT
Start: 2020-03-27 | End: 2020-04-04

## 2020-03-27 RX ORDER — CEFTRIAXONE 500 MG/1
1000 INJECTION, POWDER, FOR SOLUTION INTRAMUSCULAR; INTRAVENOUS EVERY 24 HOURS
Refills: 0 | Status: DISCONTINUED | OUTPATIENT
Start: 2020-03-27 | End: 2020-03-27

## 2020-03-27 RX ORDER — CEFTRIAXONE 500 MG/1
1000 INJECTION, POWDER, FOR SOLUTION INTRAMUSCULAR; INTRAVENOUS ONCE
Refills: 0 | Status: COMPLETED | OUTPATIENT
Start: 2020-03-27 | End: 2020-03-27

## 2020-03-27 RX ORDER — ACETAMINOPHEN 500 MG
650 TABLET ORAL EVERY 6 HOURS
Refills: 0 | Status: DISCONTINUED | OUTPATIENT
Start: 2020-03-27 | End: 2020-04-05

## 2020-03-27 RX ORDER — AZITHROMYCIN 500 MG/1
500 TABLET, FILM COATED ORAL DAILY
Refills: 0 | Status: DISCONTINUED | OUTPATIENT
Start: 2020-03-27 | End: 2020-03-28

## 2020-03-27 RX ORDER — OXYCODONE AND ACETAMINOPHEN 5; 325 MG/1; MG/1
1 TABLET ORAL EVERY 6 HOURS
Refills: 0 | Status: DISCONTINUED | OUTPATIENT
Start: 2020-03-27 | End: 2020-03-27

## 2020-03-27 RX ORDER — ASPIRIN/CALCIUM CARB/MAGNESIUM 324 MG
81 TABLET ORAL DAILY
Refills: 0 | Status: DISCONTINUED | OUTPATIENT
Start: 2020-03-27 | End: 2020-03-27

## 2020-03-27 RX ORDER — SODIUM CHLORIDE 9 MG/ML
1000 INJECTION, SOLUTION INTRAVENOUS
Refills: 0 | Status: DISCONTINUED | OUTPATIENT
Start: 2020-03-27 | End: 2020-03-30

## 2020-03-27 RX ORDER — ASPIRIN/CALCIUM CARB/MAGNESIUM 324 MG
81 TABLET ORAL DAILY
Refills: 0 | Status: DISCONTINUED | OUTPATIENT
Start: 2020-03-27 | End: 2020-04-04

## 2020-03-27 RX ORDER — CLOPIDOGREL BISULFATE 75 MG/1
75 TABLET, FILM COATED ORAL DAILY
Refills: 0 | Status: DISCONTINUED | OUTPATIENT
Start: 2020-03-27 | End: 2020-04-04

## 2020-03-27 RX ORDER — ERYTHROPOIETIN 10000 [IU]/ML
10000 INJECTION, SOLUTION INTRAVENOUS; SUBCUTANEOUS
Refills: 0 | Status: DISCONTINUED | OUTPATIENT
Start: 2020-03-27 | End: 2020-04-02

## 2020-03-27 RX ADMIN — SODIUM CHLORIDE 150 MILLILITER(S): 9 INJECTION, SOLUTION INTRAVENOUS at 01:51

## 2020-03-27 RX ADMIN — SODIUM CHLORIDE 500 MILLILITER(S): 9 INJECTION INTRAMUSCULAR; INTRAVENOUS; SUBCUTANEOUS at 01:40

## 2020-03-27 RX ADMIN — SODIUM CHLORIDE 100 MILLILITER(S): 9 INJECTION, SOLUTION INTRAVENOUS at 15:44

## 2020-03-27 RX ADMIN — CEFTRIAXONE 100 MILLIGRAM(S): 500 INJECTION, POWDER, FOR SOLUTION INTRAMUSCULAR; INTRAVENOUS at 13:30

## 2020-03-27 RX ADMIN — SODIUM CHLORIDE 100 MILLILITER(S): 9 INJECTION, SOLUTION INTRAVENOUS at 21:31

## 2020-03-27 RX ADMIN — HEPARIN SODIUM 5000 UNIT(S): 5000 INJECTION INTRAVENOUS; SUBCUTANEOUS at 13:28

## 2020-03-27 RX ADMIN — CEFTRIAXONE 100 MILLIGRAM(S): 500 INJECTION, POWDER, FOR SOLUTION INTRAMUSCULAR; INTRAVENOUS at 05:16

## 2020-03-27 RX ADMIN — HEPARIN SODIUM 5000 UNIT(S): 5000 INJECTION INTRAVENOUS; SUBCUTANEOUS at 05:16

## 2020-03-27 RX ADMIN — MIDODRINE HYDROCHLORIDE 5 MILLIGRAM(S): 2.5 TABLET ORAL at 21:31

## 2020-03-27 RX ADMIN — SODIUM CHLORIDE 75 MILLILITER(S): 9 INJECTION, SOLUTION INTRAVENOUS at 05:16

## 2020-03-27 NOTE — CONSULT NOTE ADULT - ASSESSMENT
A/P 84 y o  female  from [facility] , mult co morbidities, including history of right Lung Mass, now presents with weakness,  dec po intake, hypotension, +acute renal failure, hypernatremia, ATN  Elev Lactate - hypotension - ?sepsis,   +Lung mass -  increased in size, ?likely malignant -pt refused bx in past  from notes in [facility] - was eval by Pulm in the past  r/o COVID.  Currently in IV antbx for sepsis and IV hydration .   Has PRN pain meds ordered.   *Palliative :  Asked for GOC , pt from [facility] no advanced directives in chart , seen at bedside. Pt is awake, alert  to self ,states last name only, unable to say where she is or the year.    Afebrile  this AM. She does  c/o some abd pain.  I called and spoke to multiple family members this AM, Sister Jodie Edmondson in [phone] who says we should  speak to her son .   Called and spoke to son Andrea [phone], he lives locally in . He says he and his sister Karen  are in touch, and both are involved with their mothers care.   Sister is Karen in [place] , [phone]. Called and spoke to Karen today, reviewed pts condition with her, including the pending COVID 19 test.  She also has been in touch with Andrea today . When asked, about advanced directives , Karen says there  are none in place. We did talk  about how in the past her Mother refused all medical  work up in regards to her lung mass, and kidney disease, she stated her mother just wanted it to be left alone. So we discussed how based on her moms previous answers to tests and procedures, she would likely not want many invasive measures.   I discussed CPR and intubation, and daughter feels her mother would not want those interventions done, however she says she must speak with her Brother first, and she  will get back to us. She has my contact info for questions.  Will follow  pts clinical course and cont goc discussions. A/P 84 y o  female  from [facility] , mult co morbidities, including history of right Lung Mass, now presents with weakness,  dec po intake, hypotension, +acute renal failure, hypernatremia, ATN  Elev Lactate - hypotension - ?sepsis,   +Lung mass -  increased in size, ?likely malignant -pt refused bx in past  from notes in [facility] - was eval by Pulm in the past  r/o COVID.  Currently in IV antbx for sepsis and IV hydration .   Has PRN pain meds ordered.   *Palliative :  Asked for GOC , pt from [facility] no advanced directives in chart , seen at bedside. Pt is awake, alert  to self ,states last name only, unable to say where she is or the year.    Afebrile  this AM. She does  c/o some abd pain.  I called and spoke to multiple family members this AM, Sister Jodie Edmondson in [phone] who says we should  speak to her son .   Called and spoke to son Andrea [phone], he lives locally in . He says he and his sister Karen  are in touch, and both are involved with their mothers care.   Sister is Karen in [place] , [phone]. Called and spoke to Karen today, reviewed pts condition with her, including the pending COVID 19 test.  She also has been in touch with Andrea today . When asked, about advanced directives , Karen says there  are none in place. We did talk  about how in the past her Mother refused all medical  work up in regards to her lung mass, and kidney disease, she stated her mother just wanted it to be left alone. So we discussed how based on her moms previous answers to tests and procedures, she would likely not want many invasive measures.   I discussed CPR and intubation, and daughter feels her mother would not want those interventions done, however she says she must speak with her Brother first, and she  will get back to us. She has my contact info for questions.  Will follow  pts clinical course and cont goc discussions.  .

## 2020-03-27 NOTE — SWALLOW BEDSIDE ASSESSMENT ADULT - SLP PERTINENT HISTORY OF CURRENT PROBLEM
istory of CKD, Anemia of chronic disease, Arthritis, GOUT, PE, CAD/HLD, right Lung Mass, sacral decub, sent to the ED from [facility] nursing home for evaluation of abnormal labs-+renal failure.  Pt with decreased po intake and noted to be hypotensive. Pt was lethargic but arousable, states she doesnt have any appetite. Denies abd pain, denies diarrhea.

## 2020-03-27 NOTE — CONSULT NOTE ADULT - SUBJECTIVE AND OBJECTIVE BOX
HPI:  84 year old female with history of CKD, Anemia of chronic disease, Arthritis, GOUT, PE, CAD/HLD, right Lung Mass, sacral decub, sent to the ED from [facility] nursing home for evaluation of abnormal labs-+renal failure.  Pt with decreased po intake and noted to be hypotensive. Pt was lethargic but  arousable.    Pt received several fluid boluses in the ED -about 4 L NS.  No report of fever, chills. Lactate was 4.       PAST MEDICAL & SURGICAL HISTORY:  HLD (hyperlipidemia)  Thyroid disease  Pulmonary embolism  Hypertension  DM (diabetes mellitus)  MI (myocardial infarction)  CAD (coronary artery disease)  Obesity  Rheumatoid arthritis  History of hysterectomy      SOCIAL HISTORY:    Admitted from:  [facility]   Substance abuse history:              Tobacco hx:                  Alcohol hx:              Home Opioid hx:  Jew:                                    Preferred Language:    Surrogate/HCP:        Daughter  Karen     # [phone]         Son:  Andrea   [phone]          FAMILY HISTORY:  No pertinent family history in first degree relatives    Baseline ADLs (prior to admission):    Allergies    Berries (Angioedema (Mild to Mod))  sulfa drugs (Rash)    Intolerances      Present Symptoms:   Dyspnea: mild  Nausea/Vomiting:   Anxiety:  Depressed   Fatigue:  Loss of appetite:   Pain:                                location:          Review of Systems:  Unable to obtain due to poor mentation]    MEDICATIONS  (STANDING):  aspirin  chewable 81 milliGRAM(s) Oral daily  azithromycin   Tablet 500 milliGRAM(s) Oral daily  clopidogrel Tablet 75 milliGRAM(s) Oral daily  dextrose 5%. 1000 milliLiter(s) (75 mL/Hr) IV Continuous <Continuous>  heparin  Injectable 5000 Unit(s) SubCutaneous every 12 hours    MEDICATIONS  (PRN):  acetaminophen   Tablet .. 650 milliGRAM(s) Oral every 6 hours PRN Temp greater or equal to 38C (100.4F), Mild Pain (1 - 3)  acetaminophen  Suppository .. 650 milliGRAM(s) Rectal every 8 hours PRN Temp greater or equal to 38C (100.4F), Mild Pain (1 - 3)  oxycodone    5 mG/acetaminophen 325 mG 1 Tablet(s) Oral every 6 hours PRN Severe Pain (7 - 10)      PHYSICAL EXAM:    Vital Signs Last 24 Hrs  T(C): 36.6 (27 Mar 2020 06:48), Max: 37.9 (26 Mar 2020 20:15)  T(F): 97.9 (27 Mar 2020 06:48), Max: 100.3 (26 Mar 2020 20:15)  HR: 66 (27 Mar 2020 10:38) (64 - 110)  BP: 87/40 (27 Mar 2020 10:38) (77/63 - 105/91)  BP(mean): 72 (27 Mar 2020 01:00) (70 - 97)  RR: 18 (27 Mar 2020 10:38) (18 - 22)  SpO2: 100% (27 Mar 2020 10:38) (89% - 100%)    General: alert  oriented x _1 mildly distressed , verbal  few words   Karnofsky Performance Score/Palliative Performance Status Version2:  20   %  HEENT: n/c, a/t ,  dry mouth , edentulous     Lungs: few coarse   CV: s1s2  GI: soft, n/t    :  clinton  Musculoskeletal: normal  w/weakness , le contracture     currently  bedbound        Skin: warm dry  pressure ulcers: sacrum ( per chart review)     Neuro: + cognitive impairment  alert, to self only,   Oral intake ability:  full assist   Diet: soft as joanna     LABS:                        8.3    7.13  )-----------( 113      ( 27 Mar 2020 07:24 )             26.5     03-27    156<H>  |  125<H>  |  88<H>  ----------------------------<  117<H>  3.5   |  16<L>  |  4.24<H>    Ca    7.5<L>      27 Mar 2020 07:24  Mg     1.9     03-27    TPro  8.7<H>  /  Alb  2.5<L>  /  TBili  0.4  /  DBili  x   /  AST  39  /  ALT  13  /  AlkPhos  284<H>  03-26        RADIOLOGY & ADDITIONAL STUDIES:< from: Xray Chest 1 View-PORTABLE IMMEDIATE (03.26.20 @ 21:47) >  EXAM:  XR CHEST PORTABLE IMMED 1V      PROCEDURE DATE:  03/26/2020        INTERPRETATION:  Chest portable.    Clinical History: Cough.    Comparison: 10/14/2019.    Single AP view submitted.    The evaluation of the cardiomediastinal silhouette is limited on portable technique.    Greater than 6 cm rounded mass in the right perihilar mid to lower lung zone. This appears increased in size from prior exam.    No pleural effusion is noted.    The left lung is clear.    Impression:    Findings as discussed above.   Recommend further clinical correlation and further evaluation with chest CT scan and is clinically indicated.          ADVANCE DIRECTIVES: currently  full  code   Advanced Care Planning discussion total time spent:

## 2020-03-27 NOTE — CONSULT NOTE ADULT - ATTENDING COMMENTS
I have personally seen and examined patient on the above date.  I discussed the case with Cherri Hyatt NP and I agree with findings and plan as detailed per note above, which I have amended where appropriate.

## 2020-03-27 NOTE — PROGRESS NOTE ADULT - SUBJECTIVE AND OBJECTIVE BOX
Patient is a 84y old  Female who presents with a chief complaint of weakness, lethargy, decreased oral intake (26 Mar 2020 23:40)      Patient seen and examined at bedside.    ALLERGIES:  Berries (Angioedema (Mild to Mod))  sulfa drugs (Rash)    MEDICATIONS  (STANDING):  aspirin  chewable 81 milliGRAM(s) Oral daily  azithromycin   Tablet 500 milliGRAM(s) Oral daily  clopidogrel Tablet 75 milliGRAM(s) Oral daily  dextrose 5%. 1000 milliLiter(s) (75 mL/Hr) IV Continuous <Continuous>  heparin  Injectable 5000 Unit(s) SubCutaneous every 12 hours    MEDICATIONS  (PRN):  acetaminophen   Tablet .. 650 milliGRAM(s) Oral every 6 hours PRN Temp greater or equal to 38C (100.4F), Mild Pain (1 - 3)  acetaminophen  Suppository .. 650 milliGRAM(s) Rectal every 8 hours PRN Temp greater or equal to 38C (100.4F), Mild Pain (1 - 3)  oxycodone    5 mG/acetaminophen 325 mG 1 Tablet(s) Oral every 6 hours PRN Severe Pain (7 - 10)    Vital Signs Last 24 Hrs  T(F): 97.9 (27 Mar 2020 06:48), Max: 100.3 (26 Mar 2020 20:15)  HR: 66 (27 Mar 2020 10:38) (64 - 110)  BP: 87/40 (27 Mar 2020 10:38) (77/63 - 105/91)  RR: 18 (27 Mar 2020 10:38) (18 - 22)  SpO2: 100% (27 Mar 2020 10:38) (89% - 100%)  I&O's Summary    26 Mar 2020 07:01  -  27 Mar 2020 07:00  --------------------------------------------------------  IN: 0 mL / OUT: 50 mL / NET: -50 mL        PHYSICAL EXAM:  General: NAD, A/O x 3  ENT: MMM  Neck: Supple, No JVD  Lungs: Clear to auscultation bilaterally  Cardio: RRR, S1/S2, No murmurs  Abdomen: Soft, Nontender, Nondistended; Bowel sounds present  Extremities: No calf tenderness, No pitting edema    LABS:                        8.3    7.13  )-----------( 113      ( 27 Mar 2020 07:24 )             26.5       03-27    156  |  125  |  88  ----------------------------<  117  3.5   |  16  |  4.24    Ca    7.5      27 Mar 2020 07:24  Mg     1.9     03-27    TPro  8.7  /  Alb  2.5  /  TBili  0.4  /  DBili  x   /  AST  39  /  ALT  13  /  AlkPhos  284  03-26     eGFR if Non African American: 9 mL/min/1.73M2 (03-27-20 @ 07:24)  eGFR if : 11 mL/min/1.73M2 (03-27-20 @ 07:24)    PT/INR - ( 26 Mar 2020 21:10 )   PT: 13.5 sec;   INR: 1.20 ratio         PTT - ( 26 Mar 2020 21:10 )  PTT:25.1 sec   Lactate, Blood: 1.6 mmol/L (03-27 @ 07:24)  Lactate, Blood: 3.7 mmol/L (03-26 @ 23:15)  Lactate, Blood: 4.0 mmol/L (03-26 @ 21:10)    RADIOLOGY & ADDITIONAL TESTS:    Care Discussed with Consultants/Other Providers: Patient is a 84y old  Female who presents with a chief complaint of weakness, lethargy, decreased oral intake (26 Mar 2020 23:40)      Patient seen and examined at bedside.  Pt confused , disoriented , moaning and crying but denies having symptoms when asked.     ALLERGIES:  Berries (Angioedema (Mild to Mod))  sulfa drugs (Rash)    MEDICATIONS  (STANDING):  aspirin  chewable 81 milliGRAM(s) Oral daily  azithromycin   Tablet 500 milliGRAM(s) Oral daily  clopidogrel Tablet 75 milliGRAM(s) Oral daily  dextrose 5%. 1000 milliLiter(s) (75 mL/Hr) IV Continuous <Continuous>  heparin  Injectable 5000 Unit(s) SubCutaneous every 12 hours    MEDICATIONS  (PRN):  acetaminophen   Tablet .. 650 milliGRAM(s) Oral every 6 hours PRN Temp greater or equal to 38C (100.4F), Mild Pain (1 - 3)  acetaminophen  Suppository .. 650 milliGRAM(s) Rectal every 8 hours PRN Temp greater or equal to 38C (100.4F), Mild Pain (1 - 3)  oxycodone    5 mG/acetaminophen 325 mG 1 Tablet(s) Oral every 6 hours PRN Severe Pain (7 - 10)    Vital Signs Last 24 Hrs  T(F): 97.9 (27 Mar 2020 06:48), Max: 100.3 (26 Mar 2020 20:15)  HR: 66 (27 Mar 2020 10:38) (64 - 110)  BP: 87/40 (27 Mar 2020 10:38) (77/63 - 105/91)  RR: 18 (27 Mar 2020 10:38) (18 - 22)  SpO2: 100% (27 Mar 2020 10:38) (89% - 100%)  I&O's Summary    26 Mar 2020 07:01  -  27 Mar 2020 07:00  --------------------------------------------------------  IN: 0 mL / OUT: 50 mL / NET: -50 mL        PHYSICAL EXAM:  General: NAD, A/O x 0 at this point, crying and moaning  ENT: MMM  Neck: Supple, No JVD  Lungs: Clear to auscultation bilaterally  Cardio: RRR, S1/S2, No murmurs  Abdomen: Soft, Nontender, Nondistended; Bowel sounds present  Extremities: No calf tenderness, No pitting edema    LABS:                        8.3    7.13  )-----------( 113      ( 27 Mar 2020 07:24 )             26.5       03-27    156  |  125  |  88  ----------------------------<  117  3.5   |  16  |  4.24    Ca    7.5      27 Mar 2020 07:24  Mg     1.9     03-27    TPro  8.7  /  Alb  2.5  /  TBili  0.4  /  DBili  x   /  AST  39  /  ALT  13  /  AlkPhos  284  03-26     eGFR if Non African American: 9 mL/min/1.73M2 (03-27-20 @ 07:24)  eGFR if : 11 mL/min/1.73M2 (03-27-20 @ 07:24)    PT/INR - ( 26 Mar 2020 21:10 )   PT: 13.5 sec;   INR: 1.20 ratio         PTT - ( 26 Mar 2020 21:10 )  PTT:25.1 sec   Lactate, Blood: 1.6 mmol/L (03-27 @ 07:24)  Lactate, Blood: 3.7 mmol/L (03-26 @ 23:15)  Lactate, Blood: 4.0 mmol/L (03-26 @ 21:10)    RADIOLOGY & ADDITIONAL TESTS:    Care Discussed with Consultants/Other Providers: Palliative

## 2020-03-27 NOTE — SWALLOW BEDSIDE ASSESSMENT ADULT - SWALLOW EVAL: DIAGNOSIS
suspected oropharyngeal dysphagia, unable to complete full evaluation due to patient's current mental status. Patient uncontrollably crying and unable to stop despite maximum cues. Poor body position also compromised evaluation as patient unable to be moved due to mental status. Patient given limited trials of puree/thin liquids as she refused after minimal trials given. Patient with reduced oral preparation, reduced oral transit time, pharyngeal trigger timely, palpable hyolaryngeal elevation, no clinical s/s of penetration/aspiration during trials.  Due to edentulous dentition and current mental status, recommendation for conservative diet of puree/thin liquids.

## 2020-03-27 NOTE — SWALLOW BEDSIDE ASSESSMENT ADULT - COMMENTS
WBC: 8.25  Chest Xray: Greater than 6 cm rounded mass in the right perihilar mid to lower lung zone. This appears increased in size from prior exam. No pleural effusion is noted. The left lung is clear.

## 2020-03-27 NOTE — GOALS OF CARE CONVERSATION - ADVANCED CARE PLANNING - CONVERSATION DETAILS
Called and spoke to son Andrea [phone], he lives locally in . He says he and his sister Karen  are in touch, and both are involved with their mothers care.   Sister is Karen in [place] , [phone]. Called and spoke to Karen today, reviewed pts condition with her, including the pending COVID 19 test.  She also has been in touch with Andrea today . When asked, about advanced directives , Karen says there  are none in place. We did talk  about how in the past her Mother refused all medical  work up in regards to her lung mass, and kidney disease, she stated her mother just wanted it to be left alone. I discussed CPR and intubation, and daughter feels her mother would not want those interventions done, however she says she must speak with her Brother first,   Karen returned call this afternoon, they would like to wait on completing MOLST form to see if pts mentation clears.  Plan  to  re- address with family  .

## 2020-03-27 NOTE — ED ADULT NURSE REASSESSMENT NOTE - NS ED NURSE REASSESS COMMENT FT1
Pt SBP maintaining between 80s-90s. Dr. Smith notified & ordered 3rd liter NS bolus, currently infusing in right femoral central line. Dr. Smith assessed pt, says pt appears comfortable, HR & O2 sat stable, still wants pt to go to [facility].

## 2020-03-27 NOTE — SWALLOW BEDSIDE ASSESSMENT ADULT - ORAL PREPARATORY PHASE
Reduced oral grading/Lateral loss of bolus/lateral spillage due to head position Within functional limits

## 2020-03-27 NOTE — CONSULT NOTE ADULT - SUBJECTIVE AND OBJECTIVE BOX
NEPHROLOGY CONSULTATION    CHIEF COMPLAINT: FTT    HPI:  Pt is 84 years old female with history of CKD 3, Anemia of chronic disease, Arthritis, GOUT, PE, CAD/HLD, right Lung Mass, sacral decub, sent to the ED from [facility] nursing home for evaluation of abnormal labs, +renal failure. Pt with decreased po intake and noted to be hypotensive. Pt was lethargic, no abd pain, no diarrhea. No report of fever, chills. Lactate was 4. Noted to have severe dehydration and SHAWN.    ROS:  as above    Allergies:  Berries (Angioedema (Mild to Mod))  sulfa drugs (Rash)    PAST MEDICAL & SURGICAL HISTORY:  HLD (hyperlipidemia)  Thyroid disease  Pulmonary embolism  Hypertension  DM (diabetes mellitus)  MI (myocardial infarction)  CAD (coronary artery disease)  Obesity  Rheumatoid arthritis  History of hysterectomy  CKD 3    SOCIAL HISTORY:  negative    FAMILY HISTORY:  No pertinent family history in first degree relatives    MEDICATIONS  (STANDING):  aspirin  chewable 81 milliGRAM(s) Oral daily  azithromycin   Tablet 500 milliGRAM(s) Oral daily  clopidogrel Tablet 75 milliGRAM(s) Oral daily  dextrose 5%. 1000 milliLiter(s) (75 mL/Hr) IV Continuous <Continuous>  heparin  Injectable 5000 Unit(s) SubCutaneous every 12 hours    Vital Signs Last 24 Hrs  T(C): 36.5 (03-27-20 @ 13:37), Max: 37.9 (03-26-20 @ 20:15)  T(F): 97.7 (03-27-20 @ 13:37), Max: 100.3 (03-26-20 @ 20:15)  HR: 71 (03-27-20 @ 13:37) (64 - 110)  BP: 84/52 (03-27-20 @ 13:37) (77/63 - 105/91)  BP(mean): 72 (03-27-20 @ 01:00) (70 - 97)  RR: 18 (03-27-20 @ 13:37) (18 - 22)  SpO2: 100% (03-27-20 @ 13:37) (89% - 100%)    PE: deferred due to strict isolation precautions    LABS:                        8.3    7.13  )-----------( 113      ( 27 Mar 2020 07:24 )             26.5     03-27    156<H>  |  125<H>  |  88<H>  ----------------------------<  117<H>  3.5   |  16<L>  |  4.24<H>    Ca    7.5<L>      27 Mar 2020 07:24  Mg     1.9     03-27    TPro  8.7<H>  /  Alb  2.5<L>  /  TBili  0.4  /  DBili  x   /  AST  39  /  ALT  13  /  AlkPhos  284<H>  03-26    LIVER FUNCTIONS - ( 26 Mar 2020 21:10 )  Alb: 2.5 g/dL / Pro: 8.7 g/dL / ALK PHOS: 284 U/L / ALT: 13 U/L / AST: 39 U/L / GGT: x           PT/INR - ( 26 Mar 2020 21:10 )   PT: 13.5 sec;   INR: 1.20 ratio      PTT - ( 26 Mar 2020 21:10 )  PTT:25.1 sec    A/P:    R lung mass, PNA, r/o COVID  Severe dehydration, FTT, pre-renal SHAWN on CKD 3 (Cr 1.28 - 10/16/19)  Free water deficit ~ 3.5L  D5W at 100cc/hr  Midodrine 5mg TID, can increase to 10mg TID if needed for hypotension  Goal SBP > 90  BMP in am  Overall prognosis is guarded  Palliative eval most appropriate NEPHROLOGY CONSULTATION    CHIEF COMPLAINT: FTT    HPI:  Pt is 84 years old female with history of CKD 3, Anemia of chronic disease, Arthritis, GOUT, PE, CAD/HLD, right Lung Mass, sacral decub, sent to the ED from [facility] nursing home for evaluation of abnormal labs, +renal failure. Pt with decreased po intake and noted to be hypotensive. Pt was lethargic, no abd pain, no diarrhea. No report of fever, chills. Lactate was 4. Noted to have severe dehydration and SHAWN. Hx from chart.    ROS:  as above    Allergies:  Berries (Angioedema (Mild to Mod))  sulfa drugs (Rash)    PAST MEDICAL & SURGICAL HISTORY:  HLD (hyperlipidemia)  Thyroid disease  Pulmonary embolism  Hypertension  DM (diabetes mellitus)  MI (myocardial infarction)  CAD (coronary artery disease)  Obesity  Rheumatoid arthritis  History of hysterectomy  CKD 3    SOCIAL HISTORY:  negative    FAMILY HISTORY:  No pertinent family history in first degree relatives    MEDICATIONS  (STANDING):  aspirin  chewable 81 milliGRAM(s) Oral daily  azithromycin   Tablet 500 milliGRAM(s) Oral daily  clopidogrel Tablet 75 milliGRAM(s) Oral daily  dextrose 5%. 1000 milliLiter(s) (75 mL/Hr) IV Continuous <Continuous>  heparin  Injectable 5000 Unit(s) SubCutaneous every 12 hours    Vital Signs Last 24 Hrs  T(C): 36.5 (03-27-20 @ 13:37), Max: 37.9 (03-26-20 @ 20:15)  T(F): 97.7 (03-27-20 @ 13:37), Max: 100.3 (03-26-20 @ 20:15)  HR: 71 (03-27-20 @ 13:37) (64 - 110)  BP: 84/52 (03-27-20 @ 13:37) (77/63 - 105/91)  BP(mean): 72 (03-27-20 @ 01:00) (70 - 97)  RR: 18 (03-27-20 @ 13:37) (18 - 22)  SpO2: 100% (03-27-20 @ 13:37) (89% - 100%)    PE: deferred due to strict isolation precautions    LABS:                        8.3    7.13  )-----------( 113      ( 27 Mar 2020 07:24 )             26.5     03-27    156<H>  |  125<H>  |  88<H>  ----------------------------<  117<H>  3.5   |  16<L>  |  4.24<H>    Ca    7.5<L>      27 Mar 2020 07:24  Mg     1.9     03-27    TPro  8.7<H>  /  Alb  2.5<L>  /  TBili  0.4  /  DBili  x   /  AST  39  /  ALT  13  /  AlkPhos  284<H>  03-26    LIVER FUNCTIONS - ( 26 Mar 2020 21:10 )  Alb: 2.5 g/dL / Pro: 8.7 g/dL / ALK PHOS: 284 U/L / ALT: 13 U/L / AST: 39 U/L / GGT: x           PT/INR - ( 26 Mar 2020 21:10 )   PT: 13.5 sec;   INR: 1.20 ratio      PTT - ( 26 Mar 2020 21:10 )  PTT:25.1 sec    A/P:    R lung mass, PNA, r/o COVID  Severe dehydration, FTT, pre-renal SHAWN on CKD 3 (Cr 1.28 - 10/16/19)  Free water deficit ~ 3.5L  D5W at 100cc/hr  Midodrine 5mg TID, can increase to 10mg TID if needed for hypotension  Goal SBP > 90  BMP in am  Overall prognosis is guarded/poor  Palliative eval most appropriate NEPHROLOGY CONSULTATION    CHIEF COMPLAINT: FTT    HPI:  Pt is 84 years old female with history of CKD 3, Anemia of chronic disease, Arthritis, GOUT, PE, CAD/HLD, right Lung Mass, sacral decub, sent to the ED from [facility] nursing home for evaluation of abnormal labs, +renal failure. Pt with decreased po intake and noted to be hypotensive. Pt was lethargic, no abd pain, no diarrhea. No report of fever, chills. Lactate was 4. Noted to have severe dehydration and SHAWN. Hx from chart.    ROS:  as above    Allergies:  Berries (Angioedema (Mild to Mod))  sulfa drugs (Rash)    PAST MEDICAL & SURGICAL HISTORY:  HLD (hyperlipidemia)  Thyroid disease  Pulmonary embolism  Hypertension  DM (diabetes mellitus)  MI (myocardial infarction)  CAD (coronary artery disease)  Obesity  Rheumatoid arthritis  History of hysterectomy  CKD 3    SOCIAL HISTORY:  negative    FAMILY HISTORY:  No pertinent family history in first degree relatives    MEDICATIONS  (STANDING):  aspirin  chewable 81 milliGRAM(s) Oral daily  azithromycin   Tablet 500 milliGRAM(s) Oral daily  clopidogrel Tablet 75 milliGRAM(s) Oral daily  dextrose 5%. 1000 milliLiter(s) (75 mL/Hr) IV Continuous <Continuous>  heparin  Injectable 5000 Unit(s) SubCutaneous every 12 hours    Vital Signs Last 24 Hrs  T(C): 36.5 (03-27-20 @ 13:37), Max: 37.9 (03-26-20 @ 20:15)  T(F): 97.7 (03-27-20 @ 13:37), Max: 100.3 (03-26-20 @ 20:15)  HR: 71 (03-27-20 @ 13:37) (64 - 110)  BP: 84/52 (03-27-20 @ 13:37) (77/63 - 105/91)  BP(mean): 72 (03-27-20 @ 01:00) (70 - 97)  RR: 18 (03-27-20 @ 13:37) (18 - 22)  SpO2: 100% (03-27-20 @ 13:37) (89% - 100%)    PE: deferred due to strict isolation precautions    LABS:                        8.3    7.13  )-----------( 113      ( 27 Mar 2020 07:24 )             26.5     03-27    156<H>  |  125<H>  |  88<H>  ----------------------------<  117<H>  3.5   |  16<L>  |  4.24<H>    Ca    7.5<L>      27 Mar 2020 07:24  Mg     1.9     03-27    TPro  8.7<H>  /  Alb  2.5<L>  /  TBili  0.4  /  DBili  x   /  AST  39  /  ALT  13  /  AlkPhos  284<H>  03-26    LIVER FUNCTIONS - ( 26 Mar 2020 21:10 )  Alb: 2.5 g/dL / Pro: 8.7 g/dL / ALK PHOS: 284 U/L / ALT: 13 U/L / AST: 39 U/L / GGT: x           PT/INR - ( 26 Mar 2020 21:10 )   PT: 13.5 sec;   INR: 1.20 ratio      PTT - ( 26 Mar 2020 21:10 )  PTT:25.1 sec    A/P:    R lung mass, PNA, r/o COVID  Severe dehydration, FTT, pre-renal SHAWN on CKD 3 (Cr 1.28 - 10/16/19)  Free water deficit ~ 3.5L  D5W at 100cc/hr  Midodrine 5mg TID, can increase to 10mg TID if needed for hypotension  Goal SBP > 90  BMP in am  No nephrotoxins  Overall prognosis is guarded/poor  Palliative eval most appropriate

## 2020-03-27 NOTE — SWALLOW BEDSIDE ASSESSMENT ADULT - ASR SWALLOW ASPIRATION MONITOR
gurgly voice/change of breathing pattern/upper respiratory infection/oral hygiene/position upright (90Y)/fever/pneumonia/throat clearing/cough

## 2020-03-27 NOTE — PROGRESS NOTE ADULT - ASSESSMENT
84y old  Female who presents with a chief complaint of weakness, lethargy, decreased oral intake 84 years old female with history of CKD, Anemia of chronic disease, Arthritis, GOUT, PE, CAD/HLD, right Lung Mass, sacral decub, presents with weakness, dec po intake, hypotension, +acute renal failure, hypernatremia, ATN    COVID R/O (3/26/20)   - afebrile  - not hypoxic  - no leukopenia, no lymphocytopenia, no thrombocytopenia  - monitor CBC with Diff  -imaging?      Hypotension    Elev Lactate - hypotension - ?sepsis   Sepsis focused exam performed  +Lung mass - ?likely malignant -pt refused bx from notes in [facility] - was eval by Pulm in the past  r/o COVID    Admit  IV hydration- was given NS due to hypotension  then will give 0.45 NaCL, D5W thereafter  D/c lopressor, d/c colchicine, avoid nephrotoxins  Empiric Ceftriaxone pending cultures  await urinalysis, ffup labs  renal sono  Clears for now  Speech/swallow eval  Nutrition eval  Palliative care consult  DVT Prophylaxis  Poor prognosis    CAPRINI SCORE [CLOT]  [x ] Age= 75 years                                              (3 Points)                   [x ] Bed bound for more than 72 hours                 (2 Points)  Total Score [ 5 ]  Caprini Score 0 - 2:  Low Risk, No VTE Prophylaxis required for most patients, encourage ambulation  Caprini Score 3 - 6:  At Risk, pharmacologic VTE prophylaxis is indicated for most patients (in the absence of a contraindication)  Caprini Score Greater than or = 7:  High Risk, pharmacologic VTE prophylaxis is indicated for most patients (in the absence of a contraindication) 84 years old female with history of CKD, Anemia of chronic disease, Arthritis, GOUT, CAD/HLD, right Lung Mass, sacral decub, presents with weakness, dec po intake, hypotension, +acute renal failure, hypernatremia, ATN.    #COVID R/O (3/26/20)   - afebrile  - not hypoxic  - no leukopenia, no lymphocytopenia, no thrombocytopenia  - monitor CBC with Diff  - cxr: Greater than 6 cm rounded mass in the right perihilar mid to lower lung zone. This appears increased in size from prior exam.  Awaiting covid results , will monitor clinical status for now     #sepsis /Hypotension  Elev Lactate now resolved  s/p 4.5 L ivf yesterday   BP continues to remain low around 87/40  stat lactate ordered, clinically euvolemic on exam, reviewed previous chart --- pt usu runs on lower side of BP as well  will monitor BP for now, may need pressors    #SHAWN on CKD stage 3, baseline cr 1.28 as of 10/2019  #Hypernatremia  cont d5w@75 cc/hr for now  renal consult  Monitor BMP    #Lung mass - ?likely malignant -pt refused bx from notes in [facility] - was eval by Pulm in the past  r/o COVID for now    # CAD (coronary artery disease)  With history of cardiac stents, continue medical management with ASA, Plavix and statin.     # Type 2 diabetes mellitus  no home meds. check hba1c  monitor fingersticks    # HLD (hyperlipidemia)  cont statin     # Rheumatoid arthritis  Supportive care, analgesia PRN.     # DVT prophylaxis  Heparin sq.    Palliative consult for [facility] 84 years old female with history of CKD, Anemia of chronic disease, Arthritis, GOUT, CAD/HLD, right Lung Mass, sacral decub, presents with weakness, dec po intake, hypotension, +acute renal failure, hypernatremia, ATN.    #COVID R/O (3/26/20)   - afebrile  - not hypoxic  - no leukopenia, + lymphocytopenia, no thrombocytopenia  - monitor CBC with Diff  - cxr: Greater than 6 cm rounded mass in the right perihilar mid to lower lung zone. This appears increased in size from prior exam.  Awaiting covid results , will monitor clinical status for now     #sepsis /Hypotension, unclear source, pending results  Elev Lactate now resolved  s/p 4.5 L ivf yesterday   BP continues to remain low around 87/40  stat lactate ordered, clinically euvolemic on exam, reviewed previous chart --- pt usu runs on lower side of BP as well  will monitor BP for now, may need pressors  awating blood cx, cont on iv rocephin/azithro prophylactically    #SHAWN on CKD stage 3, baseline cr 1.28 as of 10/2019  #Hypernatremia  cont d5w@75 cc/hr for now  renal consult  Monitor BMP    #Lung mass - ?likely malignant -pt refused bx from notes in [facility] - was eval by Pulm in the past  r/o COVID for now    # CAD (coronary artery disease)  With history of cardiac stents, continue medical management with ASA, Plavix and statin.     # Type 2 diabetes mellitus  no home meds. check hba1c  monitor fingersticks    # HLD (hyperlipidemia)  cont statin     # Rheumatoid arthritis  Supportive care, analgesia PRN.     # DVT prophylaxis  Heparin sq.    Palliative consult for [facility] 84 years old female with history of CKD, Anemia of chronic disease, Arthritis, GOUT, CAD/HLD, right Lung Mass, sacral decub, presents with weakness, dec po intake, hypotension, +acute renal failure, hypernatremia, ATN.    #COVID R/O (3/26/20)   - afebrile  - not hypoxic  - no leukopenia, + lymphocytopenia, + thrombocytopenia (113K)  - monitor CBC with Diff  - cxr: Greater than 6 cm rounded mass in the right perihilar mid to lower lung zone. This appears increased in size from prior exam.  Awaiting covid results , will monitor clinical status for now     #sepsis /Hypotension, unclear source, pending results  Elev Lactate now resolved  s/p 4.5 L ivf yesterday   BP continues to remain low around 87/40  stat lactate ordered, clinically euvolemic on exam, reviewed previous chart --- pt usu runs on lower side of BP as well  will monitor BP for now, may need pressors  awating blood cx, cont on iv rocephin/azithro prophylactically    #SHAWN on CKD stage 3, baseline cr 1.28 as of 10/2019  #Hypernatremia  cont d5w@75 cc/hr for now  renal consult  Monitor BMP    #Lung mass - ?likely malignant -pt refused bx from notes in [facility] - was eval by Pulm in the past  r/o COVID for now    # CAD (coronary artery disease)  With history of cardiac stents, continue medical management with ASA, Plavix and statin.     # Type 2 diabetes mellitus  no home meds. check hba1c  monitor fingersticks    # HLD (hyperlipidemia)  cont statin     # Rheumatoid arthritis  Supportive care, analgesia PRN.     # DVT prophylaxis  Heparin sq.    Palliative consult for [facility]

## 2020-03-28 LAB
-  K. PNEUMONIAE GROUP: SIGNIFICANT CHANGE UP
ANION GAP SERPL CALC-SCNC: 17 MMOL/L — SIGNIFICANT CHANGE UP (ref 5–17)
BASOPHILS # BLD AUTO: 0.01 K/UL — SIGNIFICANT CHANGE UP (ref 0–0.2)
BASOPHILS NFR BLD AUTO: 0.1 % — SIGNIFICANT CHANGE UP (ref 0–2)
BUN SERPL-MCNC: 78 MG/DL — HIGH (ref 7–23)
CALCIUM SERPL-MCNC: 7.8 MG/DL — LOW (ref 8.4–10.5)
CHLORIDE SERPL-SCNC: 120 MMOL/L — HIGH (ref 96–108)
CO2 SERPL-SCNC: 14 MMOL/L — LOW (ref 22–31)
CREAT SERPL-MCNC: 3.47 MG/DL — HIGH (ref 0.5–1.3)
CULTURE RESULTS: SIGNIFICANT CHANGE UP
EOSINOPHIL # BLD AUTO: 0.01 K/UL — SIGNIFICANT CHANGE UP (ref 0–0.5)
EOSINOPHIL NFR BLD AUTO: 0.1 % — SIGNIFICANT CHANGE UP (ref 0–6)
FERRITIN SERPL-MCNC: 1117 NG/ML — HIGH (ref 15–150)
FOLATE SERPL-MCNC: 3.9 NG/ML — LOW
GLUCOSE BLDC GLUCOMTR-MCNC: 72 MG/DL — SIGNIFICANT CHANGE UP (ref 70–99)
GLUCOSE BLDC GLUCOMTR-MCNC: 93 MG/DL — SIGNIFICANT CHANGE UP (ref 70–99)
GLUCOSE SERPL-MCNC: 86 MG/DL — SIGNIFICANT CHANGE UP (ref 70–99)
HCT VFR BLD CALC: 32.3 % — LOW (ref 34.5–45)
HGB BLD-MCNC: 10.2 G/DL — LOW (ref 11.5–15.5)
IMM GRANULOCYTES NFR BLD AUTO: 0.5 % — SIGNIFICANT CHANGE UP (ref 0–1.5)
IRON SATN MFR SERPL: 14 UG/DL — LOW (ref 30–160)
IRON SATN MFR SERPL: 9 % — LOW (ref 14–50)
LYMPHOCYTES # BLD AUTO: 1.06 K/UL — SIGNIFICANT CHANGE UP (ref 1–3.3)
LYMPHOCYTES # BLD AUTO: 11.3 % — LOW (ref 13–44)
MCHC RBC-ENTMCNC: 28.1 PG — SIGNIFICANT CHANGE UP (ref 27–34)
MCHC RBC-ENTMCNC: 31.6 GM/DL — LOW (ref 32–36)
MCV RBC AUTO: 89 FL — SIGNIFICANT CHANGE UP (ref 80–100)
METHOD TYPE: SIGNIFICANT CHANGE UP
MONOCYTES # BLD AUTO: 0.22 K/UL — SIGNIFICANT CHANGE UP (ref 0–0.9)
MONOCYTES NFR BLD AUTO: 2.3 % — SIGNIFICANT CHANGE UP (ref 2–14)
NEUTROPHILS # BLD AUTO: 8.05 K/UL — HIGH (ref 1.8–7.4)
NEUTROPHILS NFR BLD AUTO: 85.7 % — HIGH (ref 43–77)
NRBC # BLD: 0 /100 WBCS — SIGNIFICANT CHANGE UP (ref 0–0)
PLATELET # BLD AUTO: 135 K/UL — LOW (ref 150–400)
POTASSIUM SERPL-MCNC: 2.9 MMOL/L — CRITICAL LOW (ref 3.5–5.3)
POTASSIUM SERPL-SCNC: 2.9 MMOL/L — CRITICAL LOW (ref 3.5–5.3)
PROT SERPL-MCNC: 5.8 G/DL — LOW (ref 6–8.3)
PROT SERPL-MCNC: 5.8 G/DL — LOW (ref 6–8.3)
RBC # BLD: 3.63 M/UL — LOW (ref 3.8–5.2)
RBC # FLD: 20 % — HIGH (ref 10.3–14.5)
SODIUM SERPL-SCNC: 151 MMOL/L — HIGH (ref 135–145)
SPECIMEN SOURCE: SIGNIFICANT CHANGE UP
TIBC SERPL-MCNC: 145 UG/DL — LOW (ref 220–430)
UIBC SERPL-MCNC: 132 UG/DL — SIGNIFICANT CHANGE UP (ref 110–370)
VIT B12 SERPL-MCNC: 1324 PG/ML — HIGH (ref 232–1245)
WBC # BLD: 9.4 K/UL — SIGNIFICANT CHANGE UP (ref 3.8–10.5)
WBC # FLD AUTO: 9.4 K/UL — SIGNIFICANT CHANGE UP (ref 3.8–10.5)

## 2020-03-28 PROCEDURE — 99233 SBSQ HOSP IP/OBS HIGH 50: CPT

## 2020-03-28 PROCEDURE — 99358 PROLONG SERVICE W/O CONTACT: CPT

## 2020-03-28 RX ORDER — ASCORBIC ACID 60 MG
1000 TABLET,CHEWABLE ORAL THREE TIMES A DAY
Refills: 0 | Status: DISCONTINUED | OUTPATIENT
Start: 2020-03-28 | End: 2020-03-31

## 2020-03-28 RX ORDER — HYDROXYCHLOROQUINE SULFATE 200 MG
400 TABLET ORAL EVERY 12 HOURS
Refills: 0 | Status: COMPLETED | OUTPATIENT
Start: 2020-03-28 | End: 2020-03-28

## 2020-03-28 RX ORDER — HYDROXYCHLOROQUINE SULFATE 200 MG
TABLET ORAL
Refills: 0 | Status: DISCONTINUED | OUTPATIENT
Start: 2020-03-28 | End: 2020-04-03

## 2020-03-28 RX ORDER — HYDROXYCHLOROQUINE SULFATE 200 MG
200 TABLET ORAL EVERY 12 HOURS
Refills: 0 | Status: DISCONTINUED | OUTPATIENT
Start: 2020-03-29 | End: 2020-04-03

## 2020-03-28 RX ORDER — THIAMINE MONONITRATE (VIT B1) 100 MG
100 TABLET ORAL DAILY
Refills: 0 | Status: DISCONTINUED | OUTPATIENT
Start: 2020-03-28 | End: 2020-04-04

## 2020-03-28 RX ORDER — AZITHROMYCIN 500 MG/1
250 TABLET, FILM COATED ORAL DAILY
Refills: 0 | Status: COMPLETED | OUTPATIENT
Start: 2020-03-28 | End: 2020-04-01

## 2020-03-28 RX ORDER — ZINC SULFATE TAB 220 MG (50 MG ZINC EQUIVALENT) 220 (50 ZN) MG
220 TAB ORAL DAILY
Refills: 0 | Status: DISCONTINUED | OUTPATIENT
Start: 2020-03-28 | End: 2020-04-04

## 2020-03-28 RX ORDER — POTASSIUM CHLORIDE 20 MEQ
10 PACKET (EA) ORAL
Refills: 0 | Status: COMPLETED | OUTPATIENT
Start: 2020-03-28 | End: 2020-03-28

## 2020-03-28 RX ADMIN — MIDODRINE HYDROCHLORIDE 5 MILLIGRAM(S): 2.5 TABLET ORAL at 14:47

## 2020-03-28 RX ADMIN — CEFTRIAXONE 100 MILLIGRAM(S): 500 INJECTION, POWDER, FOR SOLUTION INTRAMUSCULAR; INTRAVENOUS at 06:54

## 2020-03-28 RX ADMIN — AZITHROMYCIN 500 MILLIGRAM(S): 500 TABLET, FILM COATED ORAL at 11:23

## 2020-03-28 RX ADMIN — ERYTHROPOIETIN 10000 UNIT(S): 10000 INJECTION, SOLUTION INTRAVENOUS; SUBCUTANEOUS at 14:47

## 2020-03-28 RX ADMIN — SODIUM CHLORIDE 100 MILLILITER(S): 9 INJECTION, SOLUTION INTRAVENOUS at 11:25

## 2020-03-28 RX ADMIN — HEPARIN SODIUM 5000 UNIT(S): 5000 INJECTION INTRAVENOUS; SUBCUTANEOUS at 06:54

## 2020-03-28 RX ADMIN — Medication 81 MILLIGRAM(S): at 11:24

## 2020-03-28 RX ADMIN — AZITHROMYCIN 250 MILLIGRAM(S): 500 TABLET, FILM COATED ORAL at 16:03

## 2020-03-28 RX ADMIN — HEPARIN SODIUM 5000 UNIT(S): 5000 INJECTION INTRAVENOUS; SUBCUTANEOUS at 17:26

## 2020-03-28 RX ADMIN — Medication 1 TABLET(S): at 16:03

## 2020-03-28 RX ADMIN — Medication 100 MILLIGRAM(S): at 16:03

## 2020-03-28 RX ADMIN — CLOPIDOGREL BISULFATE 75 MILLIGRAM(S): 75 TABLET, FILM COATED ORAL at 11:24

## 2020-03-28 RX ADMIN — Medication 400 MILLIGRAM(S): at 16:03

## 2020-03-28 RX ADMIN — Medication 100 MILLIEQUIVALENT(S): at 13:34

## 2020-03-28 RX ADMIN — Medication 100 MILLIEQUIVALENT(S): at 11:27

## 2020-03-28 RX ADMIN — Medication 100 MILLIEQUIVALENT(S): at 13:35

## 2020-03-28 RX ADMIN — MIDODRINE HYDROCHLORIDE 5 MILLIGRAM(S): 2.5 TABLET ORAL at 06:55

## 2020-03-28 NOTE — CONSULT NOTE ADULT - ASSESSMENT
84 year old female with multiple medical problems as listed above was brought from nursing home to the ER for chief complaint of abnormal labs. She was found to have an elevated and creatine   Further workup showed that she tested positive for COVID 19, and her blood cultures tested positive for gram neg chris now identified as Klebsiella pneumoniae 1 of 4 bottles todate.  She has been started on Ceftriaxone. She was also started on Plaquenil and Zithromax for COVID tx   The source of bacteremia is not clear, it could have originated from the urine, but there is no urine culture result in the computer. Pneumonia is another possibility for the source of infection     Plan   Reviewed her orders and she received a dose of Zosyn followed by Ceftriaxone which she continues to receive. Can continue with CTX for the treatment of Klebsiella pneumoniae bacteremia   She has also been started on Plaquenil and Zithromax for COVID treatment continue for now   I reviewed and discussed findings and the case with Hospitalist taking care of the patient.

## 2020-03-28 NOTE — CONSULT NOTE ADULT - SUBJECTIVE AND OBJECTIVE BOX
HPI:   Patient is a 84y female with 84 years old female with history of CKD, Anemia of chronic disease, Arthritis, GOUT, PE, CAD/HLD, right Lung Mass, sacral decub, sent to the ED from [facility] nursing home for evaluation of abnormal labs-+renal failure.  Pt with decreased po intake and noted to be hypotensive. Pt was lethargic but arousable. Here she was found to be COVID + and also her blood cultures tested positive for Klebsiella. The blood cultures were done on her admission.     REVIEW OF SYSTEMS:  All other review of systems negative (Comprehensive ROS)    PAST MEDICAL & SURGICAL HISTORY:  HLD (hyperlipidemia)  Thyroid disease  Pulmonary embolism  Hypertension  DM (diabetes mellitus)  MI (myocardial infarction)  CAD (coronary artery disease)  Obesity  Rheumatoid arthritis  History of hysterectomy      Allergies    Berries (Angioedema (Mild to Mod))  sulfa drugs (Rash)    Intolerances            FAMILY HISTORY:  No pertinent family history in first degree relatives      SOCIAL HISTORY:  Smoking:     ETOH:     Drug Use:     Single     T(F): 99.2 (20 @ 09:29), Max: 99.2 (20 @ 09:29)  HR: 71 (20 @ 09:29)  BP: 90/62 (20 @ 09:29)  RR: 22 (20 @ 09:29)  SpO2: 96% (20 @ 09:29)  Wt(kg): --    PHYSICAL EXAM:  General: she appears chronically ill   Eyes:  anicteric, no conjunctival injection, no discharge  Oropharynx: no lesions or injection 	  Lungs: clear  Heart: regular rate and rhythm; no murmur, rubs or gallops  Abdomen: soft, nondistended, nontender, without mass or organomegaly  Skin: no lesions  Extremities: no clubbing, cyanosis, or edema  Neurologic: she is arousable, the patient is cofused     LAB RESULTS:                        10.2   9.40  )-----------( 135      ( 28 Mar 2020 07:33 )             32.3         151<H>  |  120<H>  |  78<H>  ----------------------------<  86  2.9<LL>   |  14<L>  |  3.47<H>    Ca    7.8<L>      28 Mar 2020 07:33  Mg     1.9         TPro  5.8<L>  /  Alb  x   /  TBili  x   /  DBili  x   /  AST  x   /  ALT  x   /  AlkPhos  x       LIVER FUNCTIONS - ( 28 Mar 2020 07:33 )  Alb: x     / Pro: 5.8 g/dL / ALK PHOS: x     / ALT: x     / AST: x     / GGT: x           Urinalysis Basic - ( 27 Mar 2020 14:06 )    Color: Yellow / Appearance: Clear / S.015 / pH: x  Gluc: x / Ketone: Negative  / Bili: Negative / Urobili: Negative   Blood: x / Protein: 30 mg/dL / Nitrite: Negative   Leuk Esterase: Small / RBC: 11-25 /HPF / WBC 3-5 /HPF   Sq Epi: x / Non Sq Epi: Neg.-Few / Bacteria: Negative /HPF        MICROBIOLOGY:  RECENT CULTURES:   @ 21:10 .Blood Blood-Peripheral Blood Culture PCR    Growth in anaerobic bottle: Gram Negative Rods  ***Blood Panel PCR results on this specimen are available  approximately 3 hours after the Gram stain result.***  Gram stain, PCR, and/or culture results may not always  correspond due to difference in methodologies.  ************************************************************  This PCR assay was performed using [employer].  The following targets are tested for: Enterococcus,  vancomycin resistant enterococci, Listeria monocytogenes,  coagulase negative staphylococci, S. aureus,  methicillin resistant S. aureus, Streptococcus agalactiae  (Group B), S. pneumoniae, S. pyogenes (Group A),  Acinetobacter baumannii, Enterobacter cloacae, E. coli,  Klebsiella oxytoca, K. pneumoniae, Proteus sp.,  Serratia marcescens, Haemophilus influenzae,  Neisseria meningitidis, Pseudomonas aeruginosa, Candida  albicans, C. glabrata, C krusei, C parapsilosis,  C. tropicalis and the KPC resistance gene.    Growth in anaerobic bottle: Gram Negative Rods          RADIOLOGY REVIEWED:      Antimicrobials Day #    azithromycin   Tablet 250 milliGRAM(s) Oral daily  cefTRIAXone   IVPB 1000 milliGRAM(s) IV Intermittent every 24 hours  hydroxychloroquine 400 milliGRAM(s) Oral every 12 hours  hydroxychloroquine   Oral     Other Medications:  acetaminophen   Tablet .. 650 milliGRAM(s) Oral every 6 hours PRN  acetaminophen  Suppository .. 650 milliGRAM(s) Rectal every 8 hours PRN  ascorbic acid 1000 milliGRAM(s) Oral three times a day  aspirin  chewable 81 milliGRAM(s) Oral daily  clopidogrel Tablet 75 milliGRAM(s) Oral daily  dextrose 5%. 1000 milliLiter(s) IV Continuous <Continuous>  epoetin chayito Injectable 11828 Unit(s) SubCutaneous every 7 days  heparin  Injectable 5000 Unit(s) SubCutaneous every 12 hours  midodrine. 5 milliGRAM(s) Oral every 8 hours  multivitamin 1 Tablet(s) Oral daily  oxycodone    5 mG/acetaminophen 325 mG 1 Tablet(s) Oral every 6 hours PRN  thiamine 100 milliGRAM(s) Oral daily  zinc sulfate 220 milliGRAM(s) Oral daily

## 2020-03-28 NOTE — PROGRESS NOTE ADULT - ASSESSMENT
84 years old female with history of CKD, Anemia of chronic disease, Arthritis, GOUT, CAD/HLD, right Lung Mass, sacral decub, presents with weakness, dec po intake, hypotension, +acute renal failure, hypernatremia, ATN.    #COVID Positive 3/26  #Gram negative bacteremia, unclear source  pt remains confused, afebrile, saturating % on RA  no leukopenia, + lymphocytopenia, + thrombocytopenia , monitor CBC with Diff AM  start Azithro/plaquinil, check ECG AM  start vit c/ zinc/thiamine/MV  on Rocephin Iv   ID eval for covid with bacteremia  repeat culture    #sepsis /Hypotension due to above  s/p 4.5 L ivf yesterday   monitor BP    #SHAWN on CKD stage 3, baseline cr 1.28 as of 10/2019  #Hypernatremia  cr improving  cont d5w as per renal  Renal following  monitor BMP    #Lung mass - ?likely malignant -pt refused bx from notes in [facility] - was eval by Pulm in the past    # CAD (coronary artery disease)  With history of cardiac stents, continue medical management with ASA, Plavix and statin.     # Type 2 diabetes mellitus  no home meds. check hba1c  monitor fingersticks    # HLD (hyperlipidemia)  cont statin     # Rheumatoid arthritis  Supportive care, analgesia PRN.     # DVT prophylaxis  Heparin sq.    DNR/DNI , palliative following  overall Guarded prognosis 84 years old female with history of CKD, Anemia of chronic disease, Arthritis, GOUT, CAD/HLD, right Lung Mass, sacral decub, presents with weakness, dec po intake, hypotension, +acute renal failure, hypernatremia, ATN.    #COVID Positive 3/26  #Gram negative bacteremia, unclear source  pt remains confused, afebrile, saturating % on RA  no leukopenia, + lymphocytopenia, + thrombocytopenia , monitor CBC with Diff AM  start Azithro/plaquinil, check ECG AM  start vit c/ zinc/thiamine/MV  on Rocephin Iv   ID eval for covid with bacteremia  repeat culture    #sepsis /Hypotension due to above  s/p 4.5 L ivf yesterday   monitor BP    #SHAWN on CKD stage 3, baseline cr 1.28 as of 10/2019  #Hypernatremia  cr improving  cont d5w as per renal  Renal following  monitor BMP    #Hypokalemia  repleted today  monitor bmp    #Lung mass - ?likely malignant -pt refused bx from notes in [facility] - was eval by Pulm in the past    # CAD (coronary artery disease)  With history of cardiac stents, continue medical management with ASA, Plavix and statin.     # Type 2 diabetes mellitus  no home meds. check hba1c  monitor fingersticks    # HLD (hyperlipidemia)  cont statin     # Rheumatoid arthritis  Supportive care, analgesia PRN.     # DVT prophylaxis  Heparin sq.    DNR/DNI , palliative following  overall Guarded prognosis

## 2020-03-28 NOTE — PROGRESS NOTE ADULT - SUBJECTIVE AND OBJECTIVE BOX
[facility] NEPHROLOGY SERVICES, [facility]  NEPHROLOGY AND HYPERTENSION  [address]  [address]  [place] [address]  [phone]    MD SASHA LEAL MD ANDREY GONCHARUK, MD MADHU KORRAPATI, MD YELENA ROSENBERG, MD BINNY KOSHY, MD CHRISTOPHER CAPUTO, MD BC DESAI MD          Patient events noted      MEDICATIONS  (STANDING):  ascorbic acid 1000 milliGRAM(s) Oral three times a day  aspirin  chewable 81 milliGRAM(s) Oral daily  azithromycin   Tablet 250 milliGRAM(s) Oral daily  cefTRIAXone   IVPB 1000 milliGRAM(s) IV Intermittent every 24 hours  clopidogrel Tablet 75 milliGRAM(s) Oral daily  dextrose 5%. 1000 milliLiter(s) (100 mL/Hr) IV Continuous <Continuous>  epoetin chayito Injectable 69632 Unit(s) SubCutaneous every 7 days  heparin  Injectable 5000 Unit(s) SubCutaneous every 12 hours  hydroxychloroquine 400 milliGRAM(s) Oral every 12 hours  hydroxychloroquine   Oral   midodrine. 5 milliGRAM(s) Oral every 8 hours  multivitamin 1 Tablet(s) Oral daily  thiamine 100 milliGRAM(s) Oral daily  zinc sulfate 220 milliGRAM(s) Oral daily    MEDICATIONS  (PRN):  acetaminophen   Tablet .. 650 milliGRAM(s) Oral every 6 hours PRN Temp greater or equal to 38C (100.4F), Mild Pain (1 - 3)  acetaminophen  Suppository .. 650 milliGRAM(s) Rectal every 8 hours PRN Temp greater or equal to 38C (100.4F), Mild Pain (1 - 3)  oxycodone    5 mG/acetaminophen 325 mG 1 Tablet(s) Oral every 6 hours PRN Severe Pain (7 - 10)      03-27-20 @ 07:01  -  03-28-20 @ 07:00  --------------------------------------------------------  IN: 0 mL / OUT: 400 mL / NET: -400 mL    03-28-20 @ 07:01  -  03-28-20 @ 17:37  --------------------------------------------------------  IN: 550 mL / OUT: 0 mL / NET: 550 mL      PHYSICAL EXAM:      T(C): 37.3 (03-28-20 @ 09:29), Max: 37.3 (03-28-20 @ 09:29)  HR: 71 (03-28-20 @ 09:29) (71 - 71)  BP: 90/62 (03-28-20 @ 09:29) (90/62 - 90/62)  RR: 22 (03-28-20 @ 09:29) (22 - 22)  SpO2: 96% (03-28-20 @ 09:29) (96% - 96%)  Wt(kg): --  Respiratory: clear anteriorly, decreased BS at bases  Cardiovascular: S1 S2  Gastrointestinal: soft NT ND +BS  Extremities:   1edema                                    10.2   9.40  )-----------( 135      ( 28 Mar 2020 07:33 )             32.3     03-28    151<H>  |  120<H>  |  78<H>  ----------------------------<  86  2.9<LL>   |  14<L>  |  3.47<H>    Ca    7.8<L>      28 Mar 2020 07:33  Mg     1.9     03-27    TPro  5.8<L>  /  Alb  x   /  TBili  x   /  DBili  x   /  AST  x   /  ALT  x   /  AlkPhos  x   03-28      LIVER FUNCTIONS - ( 28 Mar 2020 07:33 )  Alb: x     / Pro: 5.8 g/dL / ALK PHOS: x     / ALT: x     / AST: x     / GGT: x           Creatinine Trend: 3.47<--, 4.24<--, 5.08<--    Assessment  R lung mass, PNA, r/o COVID  SHAWN on CKD 3 (Cr 1.28 - 10/16/19) Severe dehydration, FTT, pre-renal   Gram negative sepsis    Plan  Judicious D5W in light of COVID  Midodrine 5mg TID, can increase to 10mg TID if needed for hypotension  Goal SBP > 90  Replete K  BMP in am  No nephrotoxins  Overall prognosis is guarded/poor  Palliative eval most appropriate      Anoop Stover MD

## 2020-03-28 NOTE — PROGRESS NOTE ADULT - SUBJECTIVE AND OBJECTIVE BOX
Patient is a 84y old  Female who presents with a chief complaint of weakness, lethargy, decreased oral intake (28 Mar 2020 12:09)    Interval Hx  Patient seen and examined at bedside. No overnight events reported. Pt remains confused.     ALLERGIES:  Berries (Angioedema (Mild to Mod))  sulfa drugs (Rash)    MEDICATIONS  (STANDING):  aspirin  chewable 81 milliGRAM(s) Oral daily  azithromycin   Tablet 500 milliGRAM(s) Oral daily  cefTRIAXone   IVPB 1000 milliGRAM(s) IV Intermittent every 24 hours  clopidogrel Tablet 75 milliGRAM(s) Oral daily  dextrose 5%. 1000 milliLiter(s) (100 mL/Hr) IV Continuous <Continuous>  epoetin chayito Injectable 86661 Unit(s) SubCutaneous every 7 days  heparin  Injectable 5000 Unit(s) SubCutaneous every 12 hours  midodrine. 5 milliGRAM(s) Oral every 8 hours    MEDICATIONS  (PRN):  acetaminophen   Tablet .. 650 milliGRAM(s) Oral every 6 hours PRN Temp greater or equal to 38C (100.4F), Mild Pain (1 - 3)  acetaminophen  Suppository .. 650 milliGRAM(s) Rectal every 8 hours PRN Temp greater or equal to 38C (100.4F), Mild Pain (1 - 3)  oxycodone    5 mG/acetaminophen 325 mG 1 Tablet(s) Oral every 6 hours PRN Severe Pain (7 - 10)    Vital Signs Last 24 Hrs  T(F): 99.2 (28 Mar 2020 09:29), Max: 99.2 (28 Mar 2020 09:29)  HR: 71 (28 Mar 2020 09:29) (66 - 71)  BP: 90/62 (28 Mar 2020 09:29) (90/62 - 91/44)  RR: 22 (28 Mar 2020 09:29) (18 - 22)  SpO2: 96% (28 Mar 2020 09:29) (96% - 100%)  I&O's Summary    27 Mar 2020 07:01  -  28 Mar 2020 07:00  --------------------------------------------------------  IN: 0 mL / OUT: 400 mL / NET: -400 mL    28 Mar 2020 07:01  -  28 Mar 2020 14:16  --------------------------------------------------------  IN: 550 mL / OUT: 0 mL / NET: 550 mL      PHYSICAL EXAM:  General: NAD, confused  ENT: MMM, no thrush  Neck: Supple, No JVD  Lungs: Clear to auscultation bilaterally, good air entry, non-labored breathing  Cardio: RRR, S1/S2, No murmur  Abdomen: Soft, Nontender, Nondistended; Bowel sounds present  Extremities: No calf tenderness, No pitting edema    LABS:                        10.2   9.40  )-----------( 135      ( 28 Mar 2020 07:33 )             32.3         151  |  120  |  78  ----------------------------<  86  2.9   |  14  |  3.47    Ca    7.8      28 Mar 2020 07:33  Mg     1.9         TPro  5.8  /  Alb  x   /  TBili  x   /  DBili  x   /  AST  x   /  ALT  x   /  AlkPhos  x           eGFR if Non African American: 12 mL/min/1.73M2 (20 @ 07:33)  eGFR if : 14 mL/min/1.73M2 (20 @ 07:33)    PT/INR - ( 26 Mar 2020 21:10 )   PT: 13.5 sec;   INR: 1.20 ratio         PTT - ( 26 Mar 2020 21:10 )  PTT:25.1 sec  Lactate, Blood: 1.3 mmol/L ( @ 13:37)  Lactate, Blood: 1.6 mmol/L ( @ 07:24)  Lactate, Blood: 3.7 mmol/L ( @ 23:15)  Lactate, Blood: 4.0 mmol/L ( @ 21:10)        POCT Blood Glucose.: 93 mg/dL (28 Mar 2020 11:56)  POCT Blood Glucose.: 72 mg/dL (28 Mar 2020 06:53)      Urinalysis Basic - ( 27 Mar 2020 14:06 )    Color: Yellow / Appearance: Clear / S.015 / pH: x  Gluc: x / Ketone: Negative  / Bili: Negative / Urobili: Negative   Blood: x / Protein: 30 mg/dL / Nitrite: Negative   Leuk Esterase: Small / RBC: 11-25 /HPF / WBC 3-5 /HPF   Sq Epi: x / Non Sq Epi: Neg.-Few / Bacteria: Negative /HPF        Culture - Blood (collected 26 Mar 2020 21:10)  Source: .Blood Blood-Peripheral  Preliminary Report (28 Mar 2020 10:01):    No growth to date.    Culture - Blood (collected 26 Mar 2020 21:10)  Source: .Blood Blood-Peripheral  Gram Stain (27 Mar 2020 22:43):    Growth in anaerobic bottle: Gram Negative Rods  Preliminary Report (27 Mar 2020 22:43):    Growth in anaerobic bottle: Gram Negative Rods    ***Blood Panel PCR results on this specimen are available    approximately 3 hours after the Gram stain result.***    Gram stain, PCR, and/or culture results may not always    correspond due to difference in methodologies.    ************************************************************    This PCR assay was performed using [employer].    The following targets are tested for: Enterococcus,    vancomycin resistant enterococci, Listeria monocytogenes,    coagulase negative staphylococci, S. aureus,    methicillin resistant S. aureus, Streptococcus agalactiae    (Group B), S. pneumoniae, S. pyogenes (Group A),    Acinetobacter baumannii, Enterobacter cloacae, E. coli,    Klebsiella oxytoca, K. pneumoniae, Proteus sp.,    Serratia marcescens, Haemophilus influenzae,    Neisseria meningitidis, Pseudomonas aeruginosa, Candida    albicans, C. glabrata, C krusei, C parapsilosis,    C. tropicalis and the KPC resistance gene.  Organism: Blood Culture PCR (28 Mar 2020 01:49)  Organism: Blood Culture PCR (28 Mar 2020 01:49)      -  Klebsiella pneumoniae: Detec      Method Type: PCR      RADIOLOGY & ADDITIONAL TESTS:    Care Discussed with Consultants/Other Providers:

## 2020-03-28 NOTE — PROGRESS NOTE ADULT - ASSESSMENT
A/P 84 y o  female  from [facility] , mult co morbidities, including history of right Lung Mass, now presents with weakness,  dec po intake, hypotension, +acute renal failure, hypernatremia, ATN  Elev Lactate - hypotension - ?sepsis,   +Lung mass -  increased in size, ?likely malignant -pt refused bx in past  from notes in [facility] - was eval by Pulm in the past  r/o COVID.  Currently in IV antbx for sepsis and IV hydration .   Has PRN pain meds ordered.   *Palliative :  Asked for GOC , pt from [facility] no advanced directives in chart , seen at bedside. Pt is awake, alert  to self ,states last name only, unable to say where she is or the year.    Afebrile  this AM. She does  c/o some abd pain.  I called and spoke to multiple family members this AM, Sister Jodie Edmondson in [phone] who says we should  speak to her son .   Called and spoke to son Andrea [phone], he lives locally in . He says he and his sister Karen  are in touch, and both are involved with their mothers care.   Sister is Karen in [place] , [phone]. Called and spoke to Karen today, reviewed pts condition with her, including the pending COVID 19 test.  She also has been in touch with Andrea today . When asked, about advanced directives , Karen says there  are none in place. We did talk  about how in the past her Mother refused all medical  work up in regards to her lung mass, and kidney disease, she stated her mother just wanted it to be left alone. So we discussed how based on her moms previous answers to tests and procedures, she would likely not want many invasive measures.   I discussed CPR and intubation, and daughter feels her mother would not want those interventions done, however she says she must speak with her Brother first, and she  will get back to us. She has my contact info for questions.  Will follow  pts clinical course and cont goc discussions.  .  3/28:   Essentially unchanged.  Multiple calls placed to family.  Pt has stated in the past that she wanted nothing done. A/P 84 y o  female  from [facility] , mult co morbidities, including history of right Lung Mass, now presents with weakness,  dec po intake, hypotension, +acute renal failure, hypernatremia, ATN  Elev Lactate - hypotension - ?sepsis,   +Lung mass -  increased in size, ?likely malignant -pt refused bx in past  from notes in [facility] - was eval by Pulm in the past  r/o COVID.  Currently in IV antbx for sepsis and IV hydration .   Has PRN pain meds ordered.   *Palliative :  Asked for GOC , pt from [facility] no advanced directives in chart , seen at bedside. Pt is awake, alert  to self ,states last name only, unable to say where she is or the year.    Afebrile  this AM. She does  c/o some abd pain.  I called and spoke to multiple family members this AM, Sister Jodie Edmondson in [phone] who says we should  speak to her son .   Called and spoke to son Andrea [phone], he lives locally in . He says he and his sister Karen  are in touch, and both are involved with their mothers care.   Sister is Karen in [place] , [phone]. Called and spoke to Karen today, reviewed pts condition with her, including the pending COVID 19 test.  She also has been in touch with Andrea today . When asked, about advanced directives , Karen says there  are none in place. We did talk  about how in the past her Mother refused all medical  work up in regards to her lung mass, and kidney disease, she stated her mother just wanted it to be left alone. So we discussed how based on her moms previous answers to tests and procedures, she would likely not want many invasive measures.   I discussed CPR and intubation, and daughter feels her mother would not want those interventions done, however she says she must speak with her Brother first, and she  will get back to us. She has my contact info for questions.  Will follow  pts clinical course and cont goc discussions.  .  3/28:   Essentially unchanged.  Revieweith son Andrea and daughter Casie.  They are aware that she would not want anything like cpr and ngt done   Molst done over telephone.  dnr/i, no ngt

## 2020-03-28 NOTE — CONSULT NOTE ADULT - REASON FOR ADMISSION
weakness, lethargy, decreased oral intake

## 2020-03-28 NOTE — PROGRESS NOTE ADULT - SUBJECTIVE AND OBJECTIVE BOX
Follow-up Pall Progress Note    Ariel Moreno MD  [phone]    No new  events overnight.  No apparent SOB/CP. Remains confused.    Medications:  Vital Signs Last 24 Hrs  T(C): 37.3 (28 Mar 2020 09:29), Max: 37.3 (28 Mar 2020 09:29)  T(F): 99.2 (28 Mar 2020 09:29), Max: 99.2 (28 Mar 2020 09:29)  HR: 71 (28 Mar 2020 09:29) (66 - 71)  BP: 90/62 (28 Mar 2020 09:29) (84/52 - 91/44)  BP(mean): --  RR: 22 (28 Mar 2020 09:29) (18 - 22)  SpO2: 96% (28 Mar 2020 09:29) (96% - 100%)          03-27 @ 07:01  -  03-28 @ 07:00  --------------------------------------------------------  IN: 0 mL / OUT: 400 mL / NET: -400 mL        LABS:                        10.2   9.40  )-----------( 135      ( 28 Mar 2020 07:33 )             32.3     03-28    151<H>  |  120<H>  |  78<H>  ----------------------------<  86  2.9<LL>   |  14<L>  |  3.47<H>    Ca    7.8<L>      28 Mar 2020 07:33  Mg     1.9     03-27    TPro  5.8<L>  /  Alb  x   /  TBili  x   /  DBili  x   /  AST  x   /  ALT  x   /  AlkPhos  x   03-28      PT/INR - ( 26 Mar 2020 21:10 )   PT: 13.5 sec;   INR: 1.20 ratio         PTT - ( 26 Mar 2020 21:10 )  PTT:25.1 sec        CULTURES:  Culture Results:   Growth in anaerobic bottle: Gram Negative Rods  ***Blood Panel PCR results on this specimen are available  approximately 3 hours after the Gram stain result.***  Gram stain, PCR, and/or culture results may not always  correspond due to difference in methodologies.  ************************************************************  This PCR assay was performed using [employer].  The following targets are tested for: Enterococcus,  vancomycin resistant enterococci, Listeria monocytogenes,  coagulase negative staphylococci, S. aureus,  methicillin resistant S. aureus, Streptococcus agalactiae  (Group B), S. pneumoniae, S. pyogenes (Group A),  Acinetobacter baumannii, Enterobacter cloacae, E. coli,  Klebsiella oxytoca, K. pneumoniae, Proteus sp.,  Serratia marcescens, Haemophilus influenzae,  Neisseria meningitidis, Pseudomonas aeruginosa, Candida  albicans, C. glabrata, C krusei, C parapsilosis,  C. tropicalis and the KPC resistance gene. (03-26 @ 21:10)  Culture Results:   No growth to date. (03-26 @ 21:10)    Most recent blood culture -- 03-26 @ 21:10   Blood Culture PCR Blood Culture PCR .Blood Blood-Peripheral 03-26 @ 21:10      Physical Examination:  reviewed with med staff.    RADIOLOGY REVIEWED  CXR:    CT chest:    TTE:

## 2020-03-29 LAB
-  AMIKACIN: SIGNIFICANT CHANGE UP
-  AMPICILLIN/SULBACTAM: SIGNIFICANT CHANGE UP
-  AMPICILLIN: SIGNIFICANT CHANGE UP
-  AZTREONAM: SIGNIFICANT CHANGE UP
-  CEFAZOLIN: SIGNIFICANT CHANGE UP
-  CEFEPIME: SIGNIFICANT CHANGE UP
-  CEFOXITIN: SIGNIFICANT CHANGE UP
-  CEFTRIAXONE: SIGNIFICANT CHANGE UP
-  CIPROFLOXACIN: SIGNIFICANT CHANGE UP
-  ERTAPENEM: SIGNIFICANT CHANGE UP
-  GENTAMICIN: SIGNIFICANT CHANGE UP
-  IMIPENEM: SIGNIFICANT CHANGE UP
-  LEVOFLOXACIN: SIGNIFICANT CHANGE UP
-  MEROPENEM: SIGNIFICANT CHANGE UP
-  PIPERACILLIN/TAZOBACTAM: SIGNIFICANT CHANGE UP
-  TOBRAMYCIN: SIGNIFICANT CHANGE UP
-  TRIMETHOPRIM/SULFAMETHOXAZOLE: SIGNIFICANT CHANGE UP
ANION GAP SERPL CALC-SCNC: 15 MMOL/L — SIGNIFICANT CHANGE UP (ref 5–17)
BASOPHILS # BLD AUTO: 0.01 K/UL — SIGNIFICANT CHANGE UP (ref 0–0.2)
BASOPHILS NFR BLD AUTO: 0.2 % — SIGNIFICANT CHANGE UP (ref 0–2)
BUN SERPL-MCNC: 66 MG/DL — HIGH (ref 7–23)
CALCIUM SERPL-MCNC: 7.7 MG/DL — LOW (ref 8.4–10.5)
CHLORIDE SERPL-SCNC: 119 MMOL/L — HIGH (ref 96–108)
CO2 SERPL-SCNC: 14 MMOL/L — LOW (ref 22–31)
CREAT SERPL-MCNC: 2.8 MG/DL — HIGH (ref 0.5–1.3)
CULTURE RESULTS: SIGNIFICANT CHANGE UP
EOSINOPHIL # BLD AUTO: 0.01 K/UL — SIGNIFICANT CHANGE UP (ref 0–0.5)
EOSINOPHIL NFR BLD AUTO: 0.2 % — SIGNIFICANT CHANGE UP (ref 0–6)
GLUCOSE SERPL-MCNC: 77 MG/DL — SIGNIFICANT CHANGE UP (ref 70–99)
HBA1C BLD-MCNC: 5.8 % — HIGH (ref 4–5.6)
HCT VFR BLD CALC: 34.6 % — SIGNIFICANT CHANGE UP (ref 34.5–45)
HGB BLD-MCNC: 10.7 G/DL — LOW (ref 11.5–15.5)
IMM GRANULOCYTES NFR BLD AUTO: 1 % — SIGNIFICANT CHANGE UP (ref 0–1.5)
LYMPHOCYTES # BLD AUTO: 0.87 K/UL — LOW (ref 1–3.3)
LYMPHOCYTES # BLD AUTO: 14.1 % — SIGNIFICANT CHANGE UP (ref 13–44)
MCHC RBC-ENTMCNC: 27.9 PG — SIGNIFICANT CHANGE UP (ref 27–34)
MCHC RBC-ENTMCNC: 30.9 GM/DL — LOW (ref 32–36)
MCV RBC AUTO: 90.3 FL — SIGNIFICANT CHANGE UP (ref 80–100)
METHOD TYPE: SIGNIFICANT CHANGE UP
MONOCYTES # BLD AUTO: 0.18 K/UL — SIGNIFICANT CHANGE UP (ref 0–0.9)
MONOCYTES NFR BLD AUTO: 2.9 % — SIGNIFICANT CHANGE UP (ref 2–14)
NEUTROPHILS # BLD AUTO: 5.03 K/UL — SIGNIFICANT CHANGE UP (ref 1.8–7.4)
NEUTROPHILS NFR BLD AUTO: 81.6 % — HIGH (ref 43–77)
NRBC # BLD: 0 /100 WBCS — SIGNIFICANT CHANGE UP (ref 0–0)
ORGANISM # SPEC MICROSCOPIC CNT: SIGNIFICANT CHANGE UP
PLATELET # BLD AUTO: 121 K/UL — LOW (ref 150–400)
POTASSIUM SERPL-MCNC: 3.2 MMOL/L — LOW (ref 3.5–5.3)
POTASSIUM SERPL-SCNC: 3.2 MMOL/L — LOW (ref 3.5–5.3)
RBC # BLD: 3.83 M/UL — SIGNIFICANT CHANGE UP (ref 3.8–5.2)
RBC # FLD: 20 % — HIGH (ref 10.3–14.5)
SODIUM SERPL-SCNC: 148 MMOL/L — HIGH (ref 135–145)
SPECIMEN SOURCE: SIGNIFICANT CHANGE UP
WBC # BLD: 6.16 K/UL — SIGNIFICANT CHANGE UP (ref 3.8–10.5)
WBC # FLD AUTO: 6.16 K/UL — SIGNIFICANT CHANGE UP (ref 3.8–10.5)

## 2020-03-29 PROCEDURE — 99233 SBSQ HOSP IP/OBS HIGH 50: CPT

## 2020-03-29 PROCEDURE — 93010 ELECTROCARDIOGRAM REPORT: CPT

## 2020-03-29 RX ORDER — ATORVASTATIN CALCIUM 80 MG/1
40 TABLET, FILM COATED ORAL AT BEDTIME
Refills: 0 | Status: DISCONTINUED | OUTPATIENT
Start: 2020-03-29 | End: 2020-04-04

## 2020-03-29 RX ORDER — FOLIC ACID 0.8 MG
1 TABLET ORAL DAILY
Refills: 0 | Status: DISCONTINUED | OUTPATIENT
Start: 2020-03-29 | End: 2020-04-04

## 2020-03-29 RX ORDER — POTASSIUM CHLORIDE 20 MEQ
20 PACKET (EA) ORAL ONCE
Refills: 0 | Status: DISCONTINUED | OUTPATIENT
Start: 2020-03-29 | End: 2020-04-02

## 2020-03-29 RX ADMIN — Medication 81 MILLIGRAM(S): at 13:53

## 2020-03-29 RX ADMIN — ATORVASTATIN CALCIUM 40 MILLIGRAM(S): 80 TABLET, FILM COATED ORAL at 22:37

## 2020-03-29 RX ADMIN — CEFTRIAXONE 100 MILLIGRAM(S): 500 INJECTION, POWDER, FOR SOLUTION INTRAMUSCULAR; INTRAVENOUS at 06:56

## 2020-03-29 RX ADMIN — Medication 1 TABLET(S): at 13:53

## 2020-03-29 RX ADMIN — Medication 1000 MILLIGRAM(S): at 22:37

## 2020-03-29 RX ADMIN — CLOPIDOGREL BISULFATE 75 MILLIGRAM(S): 75 TABLET, FILM COATED ORAL at 13:52

## 2020-03-29 RX ADMIN — AZITHROMYCIN 250 MILLIGRAM(S): 500 TABLET, FILM COATED ORAL at 13:53

## 2020-03-29 RX ADMIN — MIDODRINE HYDROCHLORIDE 5 MILLIGRAM(S): 2.5 TABLET ORAL at 13:53

## 2020-03-29 RX ADMIN — Medication 1000 MILLIGRAM(S): at 13:55

## 2020-03-29 RX ADMIN — MIDODRINE HYDROCHLORIDE 5 MILLIGRAM(S): 2.5 TABLET ORAL at 22:37

## 2020-03-29 RX ADMIN — HEPARIN SODIUM 5000 UNIT(S): 5000 INJECTION INTRAVENOUS; SUBCUTANEOUS at 17:54

## 2020-03-29 RX ADMIN — HEPARIN SODIUM 5000 UNIT(S): 5000 INJECTION INTRAVENOUS; SUBCUTANEOUS at 06:57

## 2020-03-29 NOTE — PROGRESS NOTE ADULT - SUBJECTIVE AND OBJECTIVE BOX
This note is being generated by Dr. Dunbar, on behalf of Dr. Paul    Patient is a 84y old  Female who presents with a chief complaint of weakness, lethargy, decreased oral intake (29 Mar 2020 12:35)    Patient seen and examined at bedside.     ALLERGIES:  Berries (Angioedema (Mild to Mod))  sulfa drugs (Rash)    MEDICATIONS  (STANDING):  ascorbic acid 1000 milliGRAM(s) Oral three times a day  aspirin  chewable 81 milliGRAM(s) Oral daily  azithromycin   Tablet 250 milliGRAM(s) Oral daily  cefTRIAXone   IVPB 1000 milliGRAM(s) IV Intermittent every 24 hours  clopidogrel Tablet 75 milliGRAM(s) Oral daily  dextrose 5%. 1000 milliLiter(s) (60 mL/Hr) IV Continuous <Continuous>  epoetin chayito Injectable 52097 Unit(s) SubCutaneous every 7 days  heparin  Injectable 5000 Unit(s) SubCutaneous every 12 hours  hydroxychloroquine 200 milliGRAM(s) Oral every 12 hours  hydroxychloroquine   Oral   midodrine. 5 milliGRAM(s) Oral every 8 hours  multivitamin 1 Tablet(s) Oral daily  thiamine 100 milliGRAM(s) Oral daily  zinc sulfate 220 milliGRAM(s) Oral daily    MEDICATIONS  (PRN):  acetaminophen   Tablet .. 650 milliGRAM(s) Oral every 6 hours PRN Temp greater or equal to 38C (100.4F), Mild Pain (1 - 3)  acetaminophen  Suppository .. 650 milliGRAM(s) Rectal every 8 hours PRN Temp greater or equal to 38C (100.4F), Mild Pain (1 - 3)  oxycodone    5 mG/acetaminophen 325 mG 1 Tablet(s) Oral every 6 hours PRN Severe Pain (7 - 10)    Vital Signs Last 24 Hrs  T(F): 99.3 (29 Mar 2020 12:42), Max: 99.3 (29 Mar 2020 12:42)  HR: 75 (29 Mar 2020 09:30) (75 - 79)  BP: 99/51 (29 Mar 2020 09:30) (91/55 - 99/51)  RR: 18 (29 Mar 2020 09:30) (18 - 18)  SpO2: 92% (29 Mar 2020 09:30) (92% - 97%)  I&O's Summary    28 Mar 2020 07:01  -  29 Mar 2020 07:00  --------------------------------------------------------  IN: 550 mL / OUT: 400 mL / NET: 150 mL      PHYSICAL EXAM:  General: NAD, A/O x 3  ENT: Moist mucous membranes, no thrush  Neck: Supple, No JVD  Lungs: Clear to auscultation bilaterally, good air entry, non-labored breathing  Cardio: RRR, S1/S2, No murmur  Abdomen: Soft, Nontender, Nondistended; Bowel sounds present  Extremities: No calf tenderness, No pitting edema    LABS:                        10.7   6.16  )-----------( 121      ( 29 Mar 2020 07:25 )             34.6         148  |  119  |  66  ----------------------------<  77  3.2   |  14  |  2.80    Ca    7.7      29 Mar 2020 07:25  Mg     1.9         TPro  5.8  /  Alb  x   /  TBili  x   /  DBili  x   /  AST  x   /  ALT  x   /  AlkPhos  x           eGFR if Non African American: 15 mL/min/1.73M2 (20 @ 07:25)  eGFR if African American: 18 mL/min/1.73M2 (20 @ 07:25)    PT/INR - ( 26 Mar 2020 21:10 )   PT: 13.5 sec;   INR: 1.20 ratio         PTT - ( 26 Mar 2020 21:10 )  PTT:25.1 sec  Lactate, Blood: 1.3 mmol/L ( @ 13:37)  Lactate, Blood: 1.6 mmol/L ( @ 07:24)  Lactate, Blood: 3.7 mmol/L ( @ 23:15)  Lactate, Blood: 4.0 mmol/L ( @ 21:10)       [id number] 5.8    Urinalysis Basic - ( 27 Mar 2020 14:06 )    Color: Yellow / Appearance: Clear / S.015 / pH: x  Gluc: x / Ketone: Negative  / Bili: Negative / Urobili: Negative   Blood: x / Protein: 30 mg/dL / Nitrite: Negative   Leuk Esterase: Small / RBC: 11-25 /HPF / WBC 3-5 /HPF   Sq Epi: x / Non Sq Epi: Neg.-Few / Bacteria: Negative /HPF        Culture - Urine (collected 27 Mar 2020 14:06)  Source: .Urine Clean Catch (Midstream)  Final Report (28 Mar 2020 19:37):    <10,000 CFU/mL Normal Urogenital May    Culture - Blood (collected 26 Mar 2020 21:10)  Source: .Blood Blood-Peripheral  Preliminary Report (28 Mar 2020 10:01):    No growth to date.    Culture - Blood (collected 26 Mar 2020 21:10)  Source: .Blood Blood-Peripheral  Gram Stain (27 Mar 2020 22:43):    Growth in anaerobic bottle: Gram Negative Rods  Preliminary Report (28 Mar 2020 18:26):    Growth in anaerobic bottle: Klebsiella pneumoniae    ***Blood Panel PCR results on this specimen are available    approximately 3 hours after the Gram stain result.***    Gram stain, PCR, and/or culture results may not always    correspond due to difference in methodologies.    ************************************************************    This PCR assay was performed using [employer].    The following targets are tested for: Enterococcus,    vancomycin resistant enterococci, Listeria monocytogenes,    coagulase negative staphylococci, S. aureus,    methicillin resistant S. aureus, Streptococcus agalactiae    (Group B), S. pneumoniae, S. pyogenes (Group A),    Acinetobacter baumannii, Enterobacter cloacae, E. coli,    Klebsiella oxytoca, K. pneumoniae, Proteus sp.,    Serratia marcescens, Haemophilus influenzae,    Neisseria meningitidis, Pseudomonas aeruginosa, Candida    albicans, C. glabrata, C krusei, C parapsilosis,    C. tropicalis and the KPC resistance gene.  Organism: Blood Culture PCR (28 Mar 2020 01:49)  Organism: Blood Culture PCR (28 Mar 2020 01:49)      -  Klebsiella pneumoniae: Detec      Method Type: PCR This note is being generated by Dr. Dunbar, on behalf of Dr. Paul    Patient is a 84y old  Female who presents with a chief complaint of weakness, lethargy, decreased oral intake (29 Mar 2020 12:35)    Patient seen and examined at bedside.     ALLERGIES:  Berries (Angioedema (Mild to Mod))  sulfa drugs (Rash)    MEDICATIONS  (STANDING):  ascorbic acid 1000 milliGRAM(s) Oral three times a day  aspirin  chewable 81 milliGRAM(s) Oral daily  azithromycin   Tablet 250 milliGRAM(s) Oral daily  cefTRIAXone   IVPB 1000 milliGRAM(s) IV Intermittent every 24 hours  clopidogrel Tablet 75 milliGRAM(s) Oral daily  dextrose 5%. 1000 milliLiter(s) (60 mL/Hr) IV Continuous <Continuous>  epoetin chayito Injectable 50116 Unit(s) SubCutaneous every 7 days  heparin  Injectable 5000 Unit(s) SubCutaneous every 12 hours  hydroxychloroquine 200 milliGRAM(s) Oral every 12 hours  hydroxychloroquine   Oral   midodrine. 5 milliGRAM(s) Oral every 8 hours  multivitamin 1 Tablet(s) Oral daily  thiamine 100 milliGRAM(s) Oral daily  zinc sulfate 220 milliGRAM(s) Oral daily    MEDICATIONS  (PRN):  acetaminophen   Tablet .. 650 milliGRAM(s) Oral every 6 hours PRN Temp greater or equal to 38C (100.4F), Mild Pain (1 - 3)  acetaminophen  Suppository .. 650 milliGRAM(s) Rectal every 8 hours PRN Temp greater or equal to 38C (100.4F), Mild Pain (1 - 3)  oxycodone    5 mG/acetaminophen 325 mG 1 Tablet(s) Oral every 6 hours PRN Severe Pain (7 - 10)    Vital Signs Last 24 Hrs  T(F): 99.3 (29 Mar 2020 12:42), Max: 99.3 (29 Mar 2020 12:42)  HR: 75 (29 Mar 2020 09:30) (75 - 79)  BP: 99/51 (29 Mar 2020 09:30) (91/55 - 99/51)  RR: 18 (29 Mar 2020 09:30) (18 - 18)  SpO2: 92% (29 Mar 2020 09:30) (92% - 97%)  I&O's Summary    28 Mar 2020 07:01  -  29 Mar 2020 07:00  --------------------------------------------------------  IN: 550 mL / OUT: 400 mL / NET: 150 mL      PHYSICAL EXAM:  General: NAD, answers simple question, states she is cold  ENT: Moist mucous membranes, no thrush  Neck: Supple, No JVD  Lungs: diminished bilaterally   Cardio: RRR, S1/S2, No murmur  Abdomen: Soft, Nontender, Nondistended; Bowel sounds present  Extremities: No calf tenderness, No pitting edema    LABS:                        10.7   6.16  )-----------( 121      ( 29 Mar 2020 07:25 )             34.6         148  |  119  |  66  ----------------------------<  77  3.2   |  14  |  2.80    Ca    7.7      29 Mar 2020 07:25  Mg     1.9         TPro  5.8  /  Alb  x   /  TBili  x   /  DBili  x   /  AST  x   /  ALT  x   /  AlkPhos  x           eGFR if Non African American: 15 mL/min/1.73M2 (20 @ 07:25)  eGFR if African American: 18 mL/min/1.73M2 (20 @ 07:25)    PT/INR - ( 26 Mar 2020 21:10 )   PT: 13.5 sec;   INR: 1.20 ratio         PTT - ( 26 Mar 2020 21:10 )  PTT:25.1 sec  Lactate, Blood: 1.3 mmol/L ( @ 13:37)  Lactate, Blood: 1.6 mmol/L ( @ 07:24)  Lactate, Blood: 3.7 mmol/L ( @ 23:15)  Lactate, Blood: 4.0 mmol/L ( @ 21:10)       [id number] 5.8    Urinalysis Basic - ( 27 Mar 2020 14:06 )    Color: Yellow / Appearance: Clear / S.015 / pH: x  Gluc: x / Ketone: Negative  / Bili: Negative / Urobili: Negative   Blood: x / Protein: 30 mg/dL / Nitrite: Negative   Leuk Esterase: Small / RBC: 11-25 /HPF / WBC 3-5 /HPF   Sq Epi: x / Non Sq Epi: Neg.-Few / Bacteria: Negative /HPF        Culture - Urine (collected 27 Mar 2020 14:06)  Source: .Urine Clean Catch (Midstream)  Final Report (28 Mar 2020 19:37):    <10,000 CFU/mL Normal Urogenital May    Culture - Blood (collected 26 Mar 2020 21:10)  Source: .Blood Blood-Peripheral  Preliminary Report (28 Mar 2020 10:01):    No growth to date.    Culture - Blood (collected 26 Mar 2020 21:10)  Source: .Blood Blood-Peripheral  Gram Stain (27 Mar 2020 22:43):    Growth in anaerobic bottle: Gram Negative Rods  Preliminary Report (28 Mar 2020 18:26):    Growth in anaerobic bottle: Klebsiella pneumoniae    ***Blood Panel PCR results on this specimen are available    approximately 3 hours after the Gram stain result.***    Gram stain, PCR, and/or culture results may not always    correspond due to difference in methodologies.    ************************************************************    This PCR assay was performed using [employer].    The following targets are tested for: Enterococcus,    vancomycin resistant enterococci, Listeria monocytogenes,    coagulase negative staphylococci, S. aureus,    methicillin resistant S. aureus, Streptococcus agalactiae    (Group B), S. pneumoniae, S. pyogenes (Group A),    Acinetobacter baumannii, Enterobacter cloacae, E. coli,    Klebsiella oxytoca, K. pneumoniae, Proteus sp.,    Serratia marcescens, Haemophilus influenzae,    Neisseria meningitidis, Pseudomonas aeruginosa, Candida    albicans, C. glabrata, C krusei, C parapsilosis,    C. tropicalis and the KPC resistance gene.  Organism: Blood Culture PCR (28 Mar 2020 01:49)  Organism: Blood Culture PCR (28 Mar 2020 01:49)      -  Klebsiella pneumoniae: Detec      Method Type: PCR

## 2020-03-29 NOTE — PROGRESS NOTE ADULT - ASSESSMENT
84 year old female with multiple medical problems as listed above was brought from nursing home to the ER for chief complaint of abnormal labs. She was found to have an elevated and creatine   Further workup showed that she tested positive for COVID 19, and her blood cultures tested positive for gram neg chris now identified as Klebsiella pneumoniae 1 of 4 bottles todate.  She has been started on Ceftriaxone. She was also started on Plaquenil and Zithromax for COVID tx   The source of bacteremia could be from Pneumonia   reviewed cultures and it turns out that a urine culture was sent and it has resulted on the computer and reported as normal  ross   Flow sheets reviewed she is afebrile, her white count is normal range    Plan   ·	day 2 of 5 of combination Plaquenil and Zithromax for the treatment of COVID  ·	Continue with Ceftriaxone 1 g IV q24 for the treatment of Klebsiella bacteremia   ·	continue supportive care as per medicine team.

## 2020-03-29 NOTE — PROGRESS NOTE ADULT - ASSESSMENT
84 years old female with history of CKD, Anemia of chronic disease, Arthritis, GOUT, CAD/HLD, right Lung Mass, sacral decub, presents with weakness, decreased po intake, hypotension, +acute renal failure, hypernatremia, ATN.    #COVID Positive 3/26  #Gram negative bacteremia, Klebsiella (possible pneumonia source)  #Superimposed bacterial pneumonia suspected  - Daily labs: CBC with diff, CMP, Mg, Phos  - Daily EKG - assess QTc interval   - Further risk stratisfication during hospital course if indicated: D-dimer, CRP, LDH, Troponin, Ferritin  - airbourne/droplet/contact isolation  - Oxygen as needed... if patient fails NC, advance to NRB. Avoid HFNC/NIPPV  - Azithromycin x 5 days total (consider to stop if prolonged QTc)   - Hydroxychloroquin x 5 days (consider to stop if prolonged QTc)  - Ceftriaxone for pneumonia/bacteremia, ID following  - Vitamin C   - Zinc  - Avoid systemic steroids  - Limit maintenance fluids  - HFA albuterol q6 hour via spacer if needed - avoid nebulizers if possible  - DVT ppx (even in low risk patients)    #Hypotension due to above  -keep MAP > 65, s/p multiple liter boluses     #SHAWN on CKD stage 3, baseline cr 1.28 as of 10/2019  #Suspect hypotensive ATN  slowly improving  #Hypernatremia - improving  Renal following  Replete K+ for hypokalemia, check Mg in AM    #Low folate   -will supplement    #Lung mass - ?likely malignant -pt refused bx from notes in [facility] - was eval by Pulm in the past    # CAD (coronary artery disease)  With history of cardiac stents, continue medical management with ASA, Plavi. Add statin.     # Type 2 diabetes mellitus  by history, A1C in pre-diabetic range, will not check fingersticks to minimize exposure risk in this patient   03-29 [id number] 5.8    # HLD (hyperlipidemia)  statin     # Rheumatoid arthritis  Supportive care, analgesia PRN.     # DVT prophylaxis  Heparin sq.    DNR/DNI , palliative following  overall Guarded prognosis 84 years old female with history of CKD, Anemia of chronic disease, Arthritis, GOUT, CAD/HLD, right Lung Mass, sacral decub, presents with weakness, decreased po intake, hypotension, +acute renal failure, hypernatremia, ATN.    #COVID Positive 3/26  #Gram negative bacteremia, Klebsiella (possible pneumonia source)  #Superimposed bacterial pneumonia suspected  - Daily labs: CBC with diff, CMP, Mg, Phos  - Daily EKG - assess QTc interval   - Further risk stratisfication during hospital course if indicated: D-dimer, CRP, LDH, Troponin, Ferritin  - airbourne/droplet/contact isolation  - Oxygen as needed... if patient fails NC, advance to NRB. Avoid HFNC/NIPPV  - Azithromycin x 5 days total (consider to stop if prolonged QTc)   - Hydroxychloroquin x 5 days (consider to stop if prolonged QTc)  - Ceftriaxone for pneumonia/bacteremia, ID following  - Vitamin C   - Zinc  - Avoid systemic steroids  - Limit maintenance fluids  - HFA albuterol q6 hour via spacer if needed - avoid nebulizers if possible  - DVT ppx (even in low risk patients)    #Hypotension due to above  -keep MAP > 65, s/p multiple liter boluses   -midodrine for now, taper when/as tolerated     #SHAWN on CKD stage 3, baseline cr 1.28 as of 10/2019  #Suspect hypotensive ATN  slowly improving  #Hypernatremia - improving  Renal following  Replete K+ for hypokalemia, check Mg in AM    #Low folate   -will supplement    #Lung mass - ?likely malignant -pt refused bx from notes in [facility] - was eval by Pulm in the past    # CAD (coronary artery disease)  With history of cardiac stents, continue medical management with ASA, Plavi. Add statin.     # Type 2 diabetes mellitus  by history, A1C in pre-diabetic range, will not check fingersticks to minimize exposure risk in this patient   03-29 [id number] 5.8    # HLD (hyperlipidemia)  statin     # Rheumatoid arthritis  Supportive care, analgesia PRN.     # DVT prophylaxis  Heparin sq.    DNR/DNI , palliative following  overall Guarded prognosis

## 2020-03-29 NOTE — PROGRESS NOTE ADULT - SUBJECTIVE AND OBJECTIVE BOX
CC: f/u for COVID + and Klebsiella bacteremia     Patient is resting in bed     REVIEW OF SYSTEMS:  All other review of systems negative (Comprehensive ROS)    T(F): 98.1 (20 @ 09:30), Max: 99 (20 @ 05:04)  HR: 75 (20 @ 09:30)  BP: 99/51 (20 @ 09:30)  RR: 18 (20 @ 09:30)  SpO2: 92% (20 @ 09:30)    PHYSICAL EXAM:  General: no acute distress  Eyes:  anicteric, no conjunctival injection, no discharge  Oropharynx: no lesions or injection 	  Lungs: clear to auscultation  Heart: regular rate and rhythm; no murmur  Abdomen: soft, nondistended, nontender  Skin: no lesions  Extremities: no clubbing, cyanosis, or edema  Neurologic: weak resting in bed     LAB RESULTS:                        10.7   6.16  )-----------( 121      ( 29 Mar 2020 07:25 )             34.6         148<H>  |  119<H>  |  66<H>  ----------------------------<  77  3.2<L>   |  14<L>  |  2.80<H>    Ca    7.7<L>      29 Mar 2020 07:25    TPro  5.8<L>  /  Alb  x   /  TBili  x   /  DBili  x   /  AST  x   /  ALT  x   /  AlkPhos  x   03    LIVER FUNCTIONS - ( 28 Mar 2020 07:33 )  Alb: x     / Pro: 5.8 g/dL / ALK PHOS: x     / ALT: x     / AST: x     / GGT: x           Urinalysis Basic - ( 27 Mar 2020 14:06 )    Color: Yellow / Appearance: Clear / S.015 / pH: x  Gluc: x / Ketone: Negative  / Bili: Negative / Urobili: Negative   Blood: x / Protein: 30 mg/dL / Nitrite: Negative   Leuk Esterase: Small / RBC: 11-25 /HPF / WBC 3-5 /HPF   Sq Epi: x / Non Sq Epi: Neg.-Few / Bacteria: Negative /HPF      MICROBIOLOGY:  RECENT CULTURES:   @ 14:06 .Urine Clean Catch (Midstream)     <10,000 CFU/mL Normal Urogenital May       @ 21:10 .Blood Blood-Peripheral Blood Culture PCR    Growth in anaerobic bottle: Klebsiella pneumoniae  ***Blood Panel PCR results on this specimen are available  approximately 3 hours after the Gram stain result.***  Gram stain, PCR, and/or culture results may not always  correspond due to difference in methodologies.  ************************************************************  This PCR assay was performed using [employer].  The following targets are tested for: Enterococcus,  vancomycin resistant enterococci, Listeria monocytogenes,  coagulase negative staphylococci, S. aureus,  methicillin resistant S. aureus, Streptococcus agalactiae  (Group B), S. pneumoniae, S. pyogenes (Group A),  Acinetobacter baumannii, Enterobacter cloacae, E. coli,  Klebsiella oxytoca, K. pneumoniae, Proteus sp.,  Serratia marcescens, Haemophilus influenzae,  Neisseria meningitidis, Pseudomonas aeruginosa, Candida  albicans, C. glabrata, C krusei, C parapsilosis,  C. tropicalis and the KPC resistance gene.    Growth in anaerobic bottle: Gram Negative Rods        RADIOLOGY REVIEWED:        Antimicrobials Day #    azithromycin   Tablet 250 milliGRAM(s) Oral daily  cefTRIAXone   IVPB 1000 milliGRAM(s) IV Intermittent every 24 hours  hydroxychloroquine 200 milliGRAM(s) Oral every 12 hours  hydroxychloroquine   Oral     Other Medications Reviewed

## 2020-03-30 LAB
% ALBUMIN: 35.5 % — SIGNIFICANT CHANGE UP
% ALPHA 1: 8.3 % — SIGNIFICANT CHANGE UP
% ALPHA 2: 14.2 % — SIGNIFICANT CHANGE UP
% BETA: 14.6 % — SIGNIFICANT CHANGE UP
% GAMMA: 27.4 % — SIGNIFICANT CHANGE UP
% M SPIKE: 5.9 % — SIGNIFICANT CHANGE UP
ALBUMIN SERPL ELPH-MCNC: 1.6 G/DL — LOW (ref 3.3–5)
ALBUMIN SERPL ELPH-MCNC: 2.1 G/DL — LOW (ref 3.6–5.5)
ALBUMIN/GLOB SERPL ELPH: 0.6 RATIO — SIGNIFICANT CHANGE UP
ALP SERPL-CCNC: 179 U/L — HIGH (ref 40–120)
ALPHA1 GLOB SERPL ELPH-MCNC: 0.5 G/DL — HIGH (ref 0.1–0.4)
ALPHA2 GLOB SERPL ELPH-MCNC: 0.8 G/DL — SIGNIFICANT CHANGE UP (ref 0.5–1)
ALT FLD-CCNC: 21 U/L — SIGNIFICANT CHANGE UP (ref 10–45)
ANION GAP SERPL CALC-SCNC: 14 MMOL/L — SIGNIFICANT CHANGE UP (ref 5–17)
ANION GAP SERPL CALC-SCNC: 16 MMOL/L — SIGNIFICANT CHANGE UP (ref 5–17)
AST SERPL-CCNC: 70 U/L — HIGH (ref 10–40)
B-GLOBULIN SERPL ELPH-MCNC: 0.8 G/DL — SIGNIFICANT CHANGE UP (ref 0.5–1)
BASOPHILS # BLD AUTO: 0.01 K/UL — SIGNIFICANT CHANGE UP (ref 0–0.2)
BASOPHILS NFR BLD AUTO: 0.2 % — SIGNIFICANT CHANGE UP (ref 0–2)
BILIRUB SERPL-MCNC: 0.3 MG/DL — SIGNIFICANT CHANGE UP (ref 0.2–1.2)
BUN SERPL-MCNC: 54 MG/DL — HIGH (ref 7–23)
BUN SERPL-MCNC: 57 MG/DL — HIGH (ref 7–23)
CALCIUM SERPL-MCNC: 7.1 MG/DL — LOW (ref 8.4–10.5)
CALCIUM SERPL-MCNC: 7.4 MG/DL — LOW (ref 8.4–10.5)
CHLORIDE SERPL-SCNC: 116 MMOL/L — HIGH (ref 96–108)
CHLORIDE SERPL-SCNC: 120 MMOL/L — HIGH (ref 96–108)
CO2 SERPL-SCNC: 18 MMOL/L — LOW (ref 22–31)
CO2 SERPL-SCNC: 18 MMOL/L — LOW (ref 22–31)
CREAT SERPL-MCNC: 2.37 MG/DL — HIGH (ref 0.5–1.3)
CREAT SERPL-MCNC: 2.43 MG/DL — HIGH (ref 0.5–1.3)
EOSINOPHIL # BLD AUTO: 0.01 K/UL — SIGNIFICANT CHANGE UP (ref 0–0.5)
EOSINOPHIL NFR BLD AUTO: 0.2 % — SIGNIFICANT CHANGE UP (ref 0–6)
GAMMA GLOBULIN: 1.6 G/DL — SIGNIFICANT CHANGE UP (ref 0.6–1.6)
GLUCOSE SERPL-MCNC: 115 MG/DL — HIGH (ref 70–99)
GLUCOSE SERPL-MCNC: 93 MG/DL — SIGNIFICANT CHANGE UP (ref 70–99)
HCT VFR BLD CALC: 35.9 % — SIGNIFICANT CHANGE UP (ref 34.5–45)
HGB BLD-MCNC: 11.8 G/DL — SIGNIFICANT CHANGE UP (ref 11.5–15.5)
IMM GRANULOCYTES NFR BLD AUTO: 0.8 % — SIGNIFICANT CHANGE UP (ref 0–1.5)
INTERPRETATION SERPL IFE-IMP: SIGNIFICANT CHANGE UP
LYMPHOCYTES # BLD AUTO: 0.86 K/UL — LOW (ref 1–3.3)
LYMPHOCYTES # BLD AUTO: 14.5 % — SIGNIFICANT CHANGE UP (ref 13–44)
M-SPIKE: 0.3 G/DL — HIGH (ref 0–0)
MAGNESIUM SERPL-MCNC: 1.4 MG/DL — LOW (ref 1.6–2.6)
MCHC RBC-ENTMCNC: 28.5 PG — SIGNIFICANT CHANGE UP (ref 27–34)
MCHC RBC-ENTMCNC: 32.9 GM/DL — SIGNIFICANT CHANGE UP (ref 32–36)
MCV RBC AUTO: 86.7 FL — SIGNIFICANT CHANGE UP (ref 80–100)
MONOCYTES # BLD AUTO: 0.17 K/UL — SIGNIFICANT CHANGE UP (ref 0–0.9)
MONOCYTES NFR BLD AUTO: 2.9 % — SIGNIFICANT CHANGE UP (ref 2–14)
NEUTROPHILS # BLD AUTO: 4.82 K/UL — SIGNIFICANT CHANGE UP (ref 1.8–7.4)
NEUTROPHILS NFR BLD AUTO: 81.4 % — HIGH (ref 43–77)
NRBC # BLD: 0 /100 WBCS — SIGNIFICANT CHANGE UP (ref 0–0)
PLATELET # BLD AUTO: 124 K/UL — LOW (ref 150–400)
POTASSIUM SERPL-MCNC: 2.2 MMOL/L — CRITICAL LOW (ref 3.5–5.3)
POTASSIUM SERPL-MCNC: 2.7 MMOL/L — CRITICAL LOW (ref 3.5–5.3)
POTASSIUM SERPL-SCNC: 2.2 MMOL/L — CRITICAL LOW (ref 3.5–5.3)
POTASSIUM SERPL-SCNC: 2.7 MMOL/L — CRITICAL LOW (ref 3.5–5.3)
PROT PATTERN SERPL ELPH-IMP: SIGNIFICANT CHANGE UP
PROT SERPL-MCNC: 6.5 G/DL — SIGNIFICANT CHANGE UP (ref 6–8.3)
RBC # BLD: 4.14 M/UL — SIGNIFICANT CHANGE UP (ref 3.8–5.2)
RBC # FLD: 19.5 % — HIGH (ref 10.3–14.5)
SODIUM SERPL-SCNC: 150 MMOL/L — HIGH (ref 135–145)
SODIUM SERPL-SCNC: 152 MMOL/L — HIGH (ref 135–145)
WBC # BLD: 5.92 K/UL — SIGNIFICANT CHANGE UP (ref 3.8–10.5)
WBC # FLD AUTO: 5.92 K/UL — SIGNIFICANT CHANGE UP (ref 3.8–10.5)

## 2020-03-30 PROCEDURE — 99233 SBSQ HOSP IP/OBS HIGH 50: CPT

## 2020-03-30 RX ORDER — POTASSIUM CHLORIDE 20 MEQ
20 PACKET (EA) ORAL ONCE
Refills: 0 | Status: DISCONTINUED | OUTPATIENT
Start: 2020-03-30 | End: 2020-03-30

## 2020-03-30 RX ORDER — POTASSIUM CHLORIDE 20 MEQ
10 PACKET (EA) ORAL
Refills: 0 | Status: COMPLETED | OUTPATIENT
Start: 2020-03-30 | End: 2020-03-30

## 2020-03-30 RX ORDER — MAGNESIUM SULFATE 500 MG/ML
1 VIAL (ML) INJECTION
Refills: 0 | Status: COMPLETED | OUTPATIENT
Start: 2020-03-30 | End: 2020-03-30

## 2020-03-30 RX ORDER — POTASSIUM CHLORIDE 20 MEQ
10 PACKET (EA) ORAL
Refills: 0 | Status: DISCONTINUED | OUTPATIENT
Start: 2020-03-30 | End: 2020-03-30

## 2020-03-30 RX ORDER — POTASSIUM CHLORIDE 20 MEQ
20 PACKET (EA) ORAL
Refills: 0 | Status: DISCONTINUED | OUTPATIENT
Start: 2020-03-30 | End: 2020-03-30

## 2020-03-30 RX ORDER — SODIUM CHLORIDE 9 MG/ML
1000 INJECTION, SOLUTION INTRAVENOUS
Refills: 0 | Status: DISCONTINUED | OUTPATIENT
Start: 2020-03-30 | End: 2020-03-31

## 2020-03-30 RX ADMIN — Medication 100 MILLIEQUIVALENT(S): at 16:11

## 2020-03-30 RX ADMIN — Medication 100 MILLIEQUIVALENT(S): at 14:51

## 2020-03-30 RX ADMIN — Medication 100 MILLIEQUIVALENT(S): at 13:19

## 2020-03-30 RX ADMIN — HEPARIN SODIUM 5000 UNIT(S): 5000 INJECTION INTRAVENOUS; SUBCUTANEOUS at 16:41

## 2020-03-30 RX ADMIN — Medication 100 GRAM(S): at 16:41

## 2020-03-30 RX ADMIN — CEFTRIAXONE 100 MILLIGRAM(S): 500 INJECTION, POWDER, FOR SOLUTION INTRAMUSCULAR; INTRAVENOUS at 06:21

## 2020-03-30 RX ADMIN — HEPARIN SODIUM 5000 UNIT(S): 5000 INJECTION INTRAVENOUS; SUBCUTANEOUS at 06:21

## 2020-03-30 RX ADMIN — SODIUM CHLORIDE 75 MILLILITER(S): 9 INJECTION, SOLUTION INTRAVENOUS at 13:19

## 2020-03-30 RX ADMIN — Medication 100 GRAM(S): at 16:40

## 2020-03-30 NOTE — PROGRESS NOTE ADULT - SUBJECTIVE AND OBJECTIVE BOX
Resting    Vital Signs Last 24 Hrs  T(C): 37 (03-30-20 @ 06:22), Max: 37 (03-30-20 @ 06:22)  T(F): 98.6 (03-30-20 @ 06:22), Max: 98.6 (03-30-20 @ 06:22)  HR: 87 (03-30-20 @ 06:22) (77 - 87)  BP: 100/53 (03-30-20 @ 06:22) (93/44 - 103/52)  RR: 18 (03-30-20 @ 06:22) (18 - 18)  SpO2: 99% (03-30-20 @ 06:22) (96% - 99%)    PE: deferred due to strict isolation precautions  Visually no edema                        11.8   5.92  )-----------( 124      ( 30 Mar 2020 10:04 )             35.9     30 Mar 2020 10:04    150    |  116    |  57     ----------------------------<  93     2.2     |  18     |  2.43     Ca    7.4        30 Mar 2020 10:04  Mg     1.4       30 Mar 2020 10:04    TPro  6.5    /  Alb  1.6    /  TBili  0.3    /  DBili  x      /  AST  70     /  ALT  21     /  AlkPhos  179    30 Mar 2020 10:04    LIVER FUNCTIONS - ( 30 Mar 2020 10:04 )  Alb: 1.6 g/dL / Pro: 6.5 g/dL / ALK PHOS: 179 U/L / ALT: 21 U/L / AST: 70 U/L / GGT: x           acetaminophen   Tablet .. 650 milliGRAM(s) Oral every 6 hours PRN  acetaminophen  Suppository .. 650 milliGRAM(s) Rectal every 8 hours PRN  ascorbic acid 1000 milliGRAM(s) Oral three times a day  aspirin  chewable 81 milliGRAM(s) Oral daily  atorvastatin 40 milliGRAM(s) Oral at bedtime  azithromycin   Tablet 250 milliGRAM(s) Oral daily  cefTRIAXone   IVPB 1000 milliGRAM(s) IV Intermittent every 24 hours  clopidogrel Tablet 75 milliGRAM(s) Oral daily  dextrose 5% 1000 milliLiter(s) IV Continuous <Continuous>  epoetin chayito Injectable 21367 Unit(s) SubCutaneous every 7 days  folic acid 1 milliGRAM(s) Oral daily  heparin  Injectable 5000 Unit(s) SubCutaneous every 12 hours  hydroxychloroquine 200 milliGRAM(s) Oral every 12 hours  hydroxychloroquine   Oral   magnesium sulfate  IVPB 1 Gram(s) IV Intermittent every 1 hour  midodrine. 5 milliGRAM(s) Oral every 8 hours  multivitamin 1 Tablet(s) Oral daily  oxycodone    5 mG/acetaminophen 325 mG 1 Tablet(s) Oral every 6 hours PRN  potassium chloride    Tablet ER 20 milliEquivalent(s) Oral once  potassium chloride  10 mEq/100 mL IVPB 10 milliEquivalent(s) IV Intermittent every 1 hour  thiamine 100 milliGRAM(s) Oral daily  zinc sulfate 220 milliGRAM(s) Oral daily    A/P:    R lung mass, PNA, + COVID  Adm w/severe dehydration, FTT, pre-renal SHAWN on CKD 3 (Cr 1.28 - 10/16/19)  Renal fx is improving w/IVF  Free water deficit ~ 2L  D5W w/KCl at 75cc/hr  Continue Midodrine 5mg TID  Goal SBP > 90  F/u BMP  No nephrotoxins  Overall prognosis is guarded/poor  Palliative eval most appropriate Resting    Vital Signs Last 24 Hrs  T(C): 37 (03-30-20 @ 06:22), Max: 37 (03-30-20 @ 06:22)  T(F): 98.6 (03-30-20 @ 06:22), Max: 98.6 (03-30-20 @ 06:22)  HR: 87 (03-30-20 @ 06:22) (77 - 87)  BP: 100/53 (03-30-20 @ 06:22) (93/44 - 103/52)  RR: 18 (03-30-20 @ 06:22) (18 - 18)  SpO2: 99% (03-30-20 @ 06:22) (96% - 99%)    PE: deferred due to strict isolation precautions  Visually no edema                        11.8   5.92  )-----------( 124      ( 30 Mar 2020 10:04 )             35.9     30 Mar 2020 10:04    150    |  116    |  57     ----------------------------<  93     2.2     |  18     |  2.43     Ca    7.4        30 Mar 2020 10:04  Mg     1.4       30 Mar 2020 10:04    TPro  6.5    /  Alb  1.6    /  TBili  0.3    /  DBili  x      /  AST  70     /  ALT  21     /  AlkPhos  179    30 Mar 2020 10:04    LIVER FUNCTIONS - ( 30 Mar 2020 10:04 )  Alb: 1.6 g/dL / Pro: 6.5 g/dL / ALK PHOS: 179 U/L / ALT: 21 U/L / AST: 70 U/L / GGT: x           acetaminophen   Tablet .. 650 milliGRAM(s) Oral every 6 hours PRN  acetaminophen  Suppository .. 650 milliGRAM(s) Rectal every 8 hours PRN  ascorbic acid 1000 milliGRAM(s) Oral three times a day  aspirin  chewable 81 milliGRAM(s) Oral daily  atorvastatin 40 milliGRAM(s) Oral at bedtime  azithromycin   Tablet 250 milliGRAM(s) Oral daily  cefTRIAXone   IVPB 1000 milliGRAM(s) IV Intermittent every 24 hours  clopidogrel Tablet 75 milliGRAM(s) Oral daily  dextrose 5% 1000 milliLiter(s) IV Continuous <Continuous>  epoetin chayito Injectable 87099 Unit(s) SubCutaneous every 7 days  folic acid 1 milliGRAM(s) Oral daily  heparin  Injectable 5000 Unit(s) SubCutaneous every 12 hours  hydroxychloroquine 200 milliGRAM(s) Oral every 12 hours  hydroxychloroquine   Oral   magnesium sulfate  IVPB 1 Gram(s) IV Intermittent every 1 hour  midodrine. 5 milliGRAM(s) Oral every 8 hours  multivitamin 1 Tablet(s) Oral daily  oxycodone    5 mG/acetaminophen 325 mG 1 Tablet(s) Oral every 6 hours PRN  potassium chloride    Tablet ER 20 milliEquivalent(s) Oral once  potassium chloride  10 mEq/100 mL IVPB 10 milliEquivalent(s) IV Intermittent every 1 hour  thiamine 100 milliGRAM(s) Oral daily  zinc sulfate 220 milliGRAM(s) Oral daily    A/P:    R lung mass, PNA, + COVID  Adm w/severe dehydration, FTT, pre-renal SHAWN on CKD 3 (Cr 1.28 - 10/16/19)  Renal fx is improving w/IVF  Free water deficit ~ 2L  D5W w/KCl at 75cc/hr  Continue Midodrine 5mg TID  Goal SBP > 90  Suppl K, Mg  F/u BMP, Mg level  No nephrotoxins  Overall prognosis is guarded/poor  Palliative eval most appropriate

## 2020-03-30 NOTE — DIETITIAN INITIAL EVALUATION ADULT. - OTHER INFO
Unable to interview by phone. Pt. lethargic. COVID 19+. RN reports pt. is not eating due to lethargy. Present diet is DASH Dysphagia 1 w/ thin liquids. Residnet of [facility]- poor po intake PTA as well. Weight hx. unknown. No N/V/ diarrhea. Stage 2 sacrum noted. Generalized edema noted.  S/P swallow eval completed-diet appropriate. Pt. is at nutritional risk at present and may benefit from supplements

## 2020-03-30 NOTE — PROGRESS NOTE ADULT - ASSESSMENT
84 years old female with history of CKD, Anemia of chronic disease, Arthritis, GOUT, CAD/HLD, right Lung Mass, sacral decub, presents with weakness, decreased po intake, hypotension, +acute renal failure, hypernatremia, ATN.    COVID Positive 3/26  Gram negative bacteremia, Klebsiella (possible pneumonia source)  Superimposed bacterial pneumonia suspected  - azithromycin/plaquenil day 3/5  - cont  ceftriaxone for bacteremia  - recheck blood cultures today     Hypotension - resolved  -keep MAP > 65, s/p multiple liter boluses   -midodrine for now, taper when/as tolerated     SHAWN on CKD stage 3, baseline cr 1.28 as of 10/2019  Hypokalemia  Hypernatremia  Suspect hypotensive ATN  slowly improving  Renal following  Replete K+ for hypokalemia and replete hypomagnesemia     Low folate   -will supplement    Lung mass - ?likely malignant -pt refused bx from notes in [facility] - was eval by Pulm in the past    CAD (coronary artery disease)  With history of cardiac stents, continue medical management with ASA, Plavi. Add statin.     Type 2 diabetes mellitus  by history, A1C in pre-diabetic range, will not check fingersticks to minimize exposure risk in this patient   03-29 [id number] 5.8    HLD (hyperlipidemia)  statin     # Rheumatoid arthritis  Supportive care, analgesia PRN.     # DVT prophylaxis  Heparin sq.    DNR/DNI , palliative following  overall Guarded prognosis    tressa kahn updated.

## 2020-03-30 NOTE — PROGRESS NOTE ADULT - ASSESSMENT
84 year old female with multiple medical problems as listed above was brought from nursing home to the ER for chief complaint of abnormal labs. She was found to have an elevated and creatine   Further workup showed that she tested positive for COVID 19, and her blood cultures tested positive for gram neg chris now identified as Klebsiella pneumoniae 1 of 4 bottles todate.  She has been started on Ceftriaxone. She was also started on Plaquenil and Zithromax for COVID tx   The primary focus for bacteremia not clear but nor urine culture sent from the ER.  Her bacteremia appears controlled and she has no abdominal pain.  Plan   ·	day 3 of 5 of combination Plaquenil and Zithromax for the treatment of COVID  ·	Continue with Ceftriaxone 1 g IV q24 for the treatment of Klebsiella bacteremia   ·	continue supportive care as per medicine team.   ·	respiratory status seems stable 84 year old female with multiple medical problems as listed above was brought from nursing home to the ER for chief complaint of abnormal labs. She was found to have an elevated and creatine   Further workup showed that she tested positive for COVID 19, and her blood cultures tested positive for gram neg chris now identified as Klebsiella pneumoniae 1 of 4 bottles todate.  She has been started on Ceftriaxone. She was also started on Plaquenil and Zithromax for COVID tx   The primary focus for bacteremia not clear but nor urine culture sent from the ER.  Her bacteremia appears controlled and she has no abdominal pain.  Chest CT with lung  mass.  Plan   ·	day 3 of 5 of combination Plaquenil and Zithromax for the treatment of COVID  ·	Continue with Ceftriaxone 1 g IV q24 for the treatment of Klebsiella bacteremia   ·	continue supportive care as per medicine team.   ·	respiratory status seems stable

## 2020-03-30 NOTE — PROGRESS NOTE ADULT - SUBJECTIVE AND OBJECTIVE BOX
Patient is a 84y old  Female who presents with a chief complaint of weakness, lethargy, decreased oral intake (29 Mar 2020 13:13)      Patient seen and examined at bedside.    ALLERGIES:  Berries (Angioedema (Mild to Mod))  sulfa drugs (Rash)    MEDICATIONS  (STANDING):  ascorbic acid 1000 milliGRAM(s) Oral three times a day  aspirin  chewable 81 milliGRAM(s) Oral daily  atorvastatin 40 milliGRAM(s) Oral at bedtime  azithromycin   Tablet 250 milliGRAM(s) Oral daily  cefTRIAXone   IVPB 1000 milliGRAM(s) IV Intermittent every 24 hours  clopidogrel Tablet 75 milliGRAM(s) Oral daily  dextrose 5% 1000 milliLiter(s) (75 mL/Hr) IV Continuous <Continuous>  epoetin chayito Injectable 05706 Unit(s) SubCutaneous every 7 days  folic acid 1 milliGRAM(s) Oral daily  heparin  Injectable 5000 Unit(s) SubCutaneous every 12 hours  hydroxychloroquine 200 milliGRAM(s) Oral every 12 hours  hydroxychloroquine   Oral   magnesium sulfate  IVPB 1 Gram(s) IV Intermittent every 1 hour  midodrine. 5 milliGRAM(s) Oral every 8 hours  multivitamin 1 Tablet(s) Oral daily  potassium chloride    Tablet ER 20 milliEquivalent(s) Oral once  potassium chloride  10 mEq/100 mL IVPB 10 milliEquivalent(s) IV Intermittent every 1 hour  thiamine 100 milliGRAM(s) Oral daily  zinc sulfate 220 milliGRAM(s) Oral daily    MEDICATIONS  (PRN):  acetaminophen   Tablet .. 650 milliGRAM(s) Oral every 6 hours PRN Temp greater or equal to 38C (100.4F), Mild Pain (1 - 3)  acetaminophen  Suppository .. 650 milliGRAM(s) Rectal every 8 hours PRN Temp greater or equal to 38C (100.4F), Mild Pain (1 - 3)  oxycodone    5 mG/acetaminophen 325 mG 1 Tablet(s) Oral every 6 hours PRN Severe Pain (7 - 10)    Vital Signs Last 24 Hrs  T(F): 98.6 (30 Mar 2020 06:22), Max: 99.3 (29 Mar 2020 12:42)  HR: 87 (30 Mar 2020 06:22) (77 - 87)  BP: 100/53 (30 Mar 2020 06:22) (93/44 - 111/64)  RR: 18 (30 Mar 2020 06:22) (18 - 20)  SpO2: 99% (30 Mar 2020 06:22) (96% - 99%)  I&O's Summary    29 Mar 2020 07:01  -  30 Mar 2020 07:00  --------------------------------------------------------  IN: 770 mL / OUT: 1020 mL / NET: -250 mL        PHYSICAL EXAM:  General: NAD, A/O x 3  ENT: MMM  Neck: Supple, No JVD  Lungs: Clear to auscultation bilaterally  Cardio: RRR, S1/S2, No murmurs  Abdomen: Soft, Nontender, Nondistended; Bowel sounds present  Extremities: No calf tenderness, No pitting edema    LABS:                        11.8   5.92  )-----------( 124      ( 30 Mar 2020 10:04 )             35.9           150  |  116  |  57  ----------------------------<  93  2.2   |  18  |  2.43    Ca    7.4      30 Mar 2020 10:04  Mg     1.4         TPro  6.5  /  Alb  1.6  /  TBili  0.3  /  DBili  x   /  AST  70  /  ALT  21  /  AlkPhos  179       eGFR if Non African American: 18 mL/min/1.73M2 (20 @ 10:04)  eGFR if African American: 21 mL/min/1.73M2 (20 @ 10:04)       Lactate, Blood: 1.3 mmol/L ( @ 13:37)                         [id number] 5.8    Urinalysis Basic - ( 27 Mar 2020 14:06 )    Color: Yellow / Appearance: Clear / S.015 / pH: x  Gluc: x / Ketone: Negative  / Bili: Negative / Urobili: Negative   Blood: x / Protein: 30 mg/dL / Nitrite: Negative   Leuk Esterase: Small / RBC: 11-25 /HPF / WBC 3-5 /HPF   Sq Epi: x / Non Sq Epi: Neg.-Few / Bacteria: Negative /HPF        Culture - Blood (collected 29 Mar 2020 07:25)  Source: .Blood Blood-Peripheral  Preliminary Report (30 Mar 2020 11:02):    No growth to date.    Culture - Urine (collected 27 Mar 2020 14:06)  Source: .Urine Clean Catch (Midstream)  Final Report (28 Mar 2020 19:37):    <10,000 CFU/mL Normal Urogenital May    Culture - Blood (collected 26 Mar 2020 21:10)  Source: .Blood Blood-Peripheral  Preliminary Report (28 Mar 2020 10:01):    No growth to date.    Culture - Blood (collected 26 Mar 2020 21:10)  Source: .Blood Blood-Peripheral  Gram Stain (27 Mar 2020 22:43):    Growth in anaerobic bottle: Gram Negative Rods  Final Report (29 Mar 2020 13:49):    Growth in anaerobic bottle: Klebsiella pneumoniae    ***Blood Panel PCR results on this specimen are available    approximately 3 hours after the Gram stain result.***    Gram stain, PCR, and/or culture results may not always    correspond due to difference in methodologies.    ************************************************************    This PCR assay was performed using [employer].    The following targets are tested for: Enterococcus,    vancomycin resistant enterococci, Listeria monocytogenes,    coagulase negative staphylococci, S. aureus,    methicillin resistant S. aureus, Streptococcus agalactiae    (Group B), S. pneumoniae, S. pyogenes (Group A),    Acinetobacter baumannii, Enterobacter cloacae, E. coli,    Klebsiella oxytoca, K. pneumoniae, Proteus sp.,    Serratia marcescens, Haemophilus influenzae,    Neisseria meningitidis, Pseudomonas aeruginosa, Candida    albicans, C. glabrata, C krusei, C parapsilosis,    C. tropicalis and the KPC resistance gene.  Organism: Blood Culture PCR  Klebsiella pneumoniae (29 Mar 2020 13:49)  Organism: Klebsiella pneumoniae (29 Mar 2020 13:49)      -  Amikacin: S <=16      -  Ampicillin: R 16 These ampicillin results predict results for amoxicillin      -  Ampicillin/Sulbactam: S <=4/2 Enterobacter, Citrobacter, and Serratia may develop resistance during prolonged therapy (3-4 days)      -  Aztreonam: S <=4      -  Cefazolin: S <=2 Enterobacter, Citrobacter, and Serratia may develop resistance during prolonged therapy (3-4 days)      -  Cefepime: S <=2      -  Cefoxitin: S <=8      -  Ceftriaxone: S <=1 Enterobacter, Citrobacter, and Serratia may develop resistance during prolonged therapy      -  Ciprofloxacin: S <=1      -  Ertapenem: S <=0.5      -  Gentamicin: S <=2      -  Imipenem: S <=1      -  Levofloxacin: S <=2      -  Meropenem: S <=1      -  Piperacillin/Tazobactam: S <=8      -  Tobramycin: S <=2      -  Trimethoprim/Sulfamethoxazole: S <=2/38      Method Type: ROSAMARIA  Organism: Blood Culture PCR (29 Mar 2020 13:49)      -  Klebsiella pneumoniae: Detec      Method Type: PCR    RADIOLOGY & ADDITIONAL TESTS:    Care Discussed with Consultants/Other Providers:

## 2020-03-30 NOTE — PROGRESS NOTE ADULT - SUBJECTIVE AND OBJECTIVE BOX
CC: f/u for klebsiella bacteremia and Covid 19    Patient reports; she is alert, confused    REVIEW OF SYSTEMS:  All other review of systems negative (Comprehensive ROS)    Antimicrobials Day #  :day 3  azithromycin   Tablet 250 milliGRAM(s) Oral daily  cefTRIAXone   IVPB 1000 milliGRAM(s) IV Intermittent every 24 hours  hydroxychloroquine 200 milliGRAM(s) Oral every 12 hours  hydroxychloroquine   Oral     Other Medications Reviewed    T(F): 98.6 (03-30-20 @ 06:22), Max: 98.6 (03-30-20 @ 06:22)  HR: 87 (03-30-20 @ 06:22)  BP: 100/53 (03-30-20 @ 06:22)  RR: 18 (03-30-20 @ 06:22)  SpO2: 99% (03-30-20 @ 06:22)  Wt(kg): --    PHYSICAL EXAM:  General: alert, no acute distress  Eyes:  anicteric, no conjunctival injection, no discharge  Oropharynx: no lesions or injection 	  Neck: supple, without adenopathy  Lungs: coarse BS  Heart: regular rate and rhythm; no murmur, rubs or gallops  Abdomen: soft, nondistended, nontender, without mass or organomegaly  Skin: no lesions  Extremities: no clubbing, cyanosis, or edema  Neurologic: alert, confused, moves all extremities  Gu [place] in place    LAB RESULTS:                        11.8   5.92  )-----------( 124      ( 30 Mar 2020 10:04 )             35.9     03-30    150<H>  |  116<H>  |  57<H>  ----------------------------<  93  2.2<LL>   |  18<L>  |  2.43<H>    Ca    7.4<L>      30 Mar 2020 10:04  Mg     1.4     03-30    TPro  6.5  /  Alb  1.6<L>  /  TBili  0.3  /  DBili  x   /  AST  70<H>  /  ALT  21  /  AlkPhos  179<H>  03-30    LIVER FUNCTIONS - ( 30 Mar 2020 10:04 )  Alb: 1.6 g/dL / Pro: 6.5 g/dL / ALK PHOS: 179 U/L / ALT: 21 U/L / AST: 70 U/L / GGT: x             MICROBIOLOGY:  RECENT CULTURES:  03-29 @ 07:25 .Blood Blood-Peripheral     No growth to date.      03-27 @ 14:06 .Urine Clean Catch (Midstream)     <10,000 CFU/mL Normal Urogenital May      03-26 @ 21:10 .Blood Blood-Peripheral Blood Culture PCR  Klebsiella pneumoniae    Growth in anaerobic bottle: Klebsiella pneumoniae      Growth in anaerobic bottle: Gram Negative Rods        RADIOLOGY REVIEWED:    < from: Xray Chest 1 View-PORTABLE IMMEDIATE (03.26.20 @ 21:47) >  Impression:    Findings as discussed above.   Recommend further clinical correlation and further evaluation with chest CT scan and is clinically indicated.    < end of copied text > CC: f/u for klebsiella bacteremia and Covid 19    Patient reports; she is alert, confused    REVIEW OF SYSTEMS:  All other review of systems negative (Comprehensive ROS)    Antimicrobials Day #  :day 3  azithromycin   Tablet 250 milliGRAM(s) Oral daily  cefTRIAXone   IVPB 1000 milliGRAM(s) IV Intermittent every 24 hours  hydroxychloroquine 200 milliGRAM(s) Oral every 12 hours  hydroxychloroquine   Oral     Other Medications Reviewed    T(F): 98.6 (03-30-20 @ 06:22), Max: 98.6 (03-30-20 @ 06:22)  HR: 87 (03-30-20 @ 06:22)  BP: 100/53 (03-30-20 @ 06:22)  RR: 18 (03-30-20 @ 06:22)  SpO2: 99% (03-30-20 @ 06:22)  Wt(kg): --    PHYSICAL EXAM:  General: alert, no acute distress  Eyes:  anicteric, no conjunctival injection, no discharge  Oropharynx: no lesions or injection 	  Neck: supple, without adenopathy  Lungs: coarse BS  Heart: regular rate and rhythm; no murmur, rubs or gallops  Abdomen: soft, nondistended, nontender, without mass or organomegaly  Skin: no lesions  Extremities: no clubbing, cyanosis, or edema  Neurologic: alert, confused, moves all extremities  Gu cl[place] in place    LAB RESULTS:                        11.8   5.92  )-----------( 124      ( 30 Mar 2020 10:04 )             35.9     03-30    150<H>  |  116<H>  |  57<H>  ----------------------------<  93  2.2<LL>   |  18<L>  |  2.43<H>    Ca    7.4<L>      30 Mar 2020 10:04  Mg     1.4     03-30    TPro  6.5  /  Alb  1.6<L>  /  TBili  0.3  /  DBili  x   /  AST  70<H>  /  ALT  21  /  AlkPhos  179<H>  03-30    LIVER FUNCTIONS - ( 30 Mar 2020 10:04 )  Alb: 1.6 g/dL / Pro: 6.5 g/dL / ALK PHOS: 179 U/L / ALT: 21 U/L / AST: 70 U/L / GGT: x             MICROBIOLOGY:  RECENT CULTURES:  03-29 @ 07:25 .Blood Blood-Peripheral     No growth to date.      03-27 @ 14:06 .Urine Clean Catch (Midstream)     <10,000 CFU/mL Normal Urogenital May      03-26 @ 21:10 .Blood Blood-Peripheral Blood Culture PCR  Klebsiella pneumoniae    Growth in anaerobic bottle: Klebsiella pneumoniae      Growth in anaerobic bottle: Gram Negative Rods        RADIOLOGY REVIEWED:      < from: Xray Chest 1 View-PORTABLE IMMEDIATE (03.26.20 @ 21:47) >  INTERPRETATION:  Chest portable.    Clinical History: Cough.    Comparison: 10/14/2019.    Single AP view submitted.    The evaluation of the cardiomediastinal silhouette is limited on portable technique.    Greater than 6 cm rounded mass in the right perihilar mid to lower lung zone. This appears increased in size from prior exam.    No pleural effusion is noted.    The left lung is clear.    Impression:    Findings as discussed above.   Recommend further clinical correlation and further evaluation with chest CT scan and is clinically indicated.    < end of copied text >

## 2020-03-31 LAB
ALBUMIN SERPL ELPH-MCNC: 1.6 G/DL — LOW (ref 3.3–5)
ALP SERPL-CCNC: 176 U/L — HIGH (ref 40–120)
ALT FLD-CCNC: 18 U/L — SIGNIFICANT CHANGE UP (ref 10–45)
ANION GAP SERPL CALC-SCNC: 17 MMOL/L — SIGNIFICANT CHANGE UP (ref 5–17)
ANISOCYTOSIS BLD QL: SLIGHT — SIGNIFICANT CHANGE UP
AST SERPL-CCNC: 73 U/L — HIGH (ref 10–40)
BASOPHILS # BLD AUTO: 0 K/UL — SIGNIFICANT CHANGE UP (ref 0–0.2)
BASOPHILS NFR BLD AUTO: 0 % — SIGNIFICANT CHANGE UP (ref 0–2)
BILIRUB SERPL-MCNC: 0.4 MG/DL — SIGNIFICANT CHANGE UP (ref 0.2–1.2)
BUN SERPL-MCNC: 50 MG/DL — HIGH (ref 7–23)
CALCIUM SERPL-MCNC: 7.5 MG/DL — LOW (ref 8.4–10.5)
CHLORIDE SERPL-SCNC: 119 MMOL/L — HIGH (ref 96–108)
CO2 SERPL-SCNC: 17 MMOL/L — LOW (ref 22–31)
CREAT SERPL-MCNC: 2.22 MG/DL — HIGH (ref 0.5–1.3)
CULTURE RESULTS: SIGNIFICANT CHANGE UP
EOSINOPHIL # BLD AUTO: 0 K/UL — SIGNIFICANT CHANGE UP (ref 0–0.5)
EOSINOPHIL NFR BLD AUTO: 0 % — SIGNIFICANT CHANGE UP (ref 0–6)
GLUCOSE SERPL-MCNC: 97 MG/DL — SIGNIFICANT CHANGE UP (ref 70–99)
HCT VFR BLD CALC: 34.8 % — SIGNIFICANT CHANGE UP (ref 34.5–45)
HGB BLD-MCNC: 11.3 G/DL — LOW (ref 11.5–15.5)
HYPOCHROMIA BLD QL: SLIGHT — SIGNIFICANT CHANGE UP
LYMPHOCYTES # BLD AUTO: 0.84 K/UL — LOW (ref 1–3.3)
LYMPHOCYTES # BLD AUTO: 15 % — SIGNIFICANT CHANGE UP (ref 13–44)
MACROCYTES BLD QL: SLIGHT — SIGNIFICANT CHANGE UP
MAGNESIUM SERPL-MCNC: 2.3 MG/DL — SIGNIFICANT CHANGE UP (ref 1.6–2.6)
MANUAL SMEAR VERIFICATION: YES — SIGNIFICANT CHANGE UP
MCHC RBC-ENTMCNC: 28.1 PG — SIGNIFICANT CHANGE UP (ref 27–34)
MCHC RBC-ENTMCNC: 32.5 GM/DL — SIGNIFICANT CHANGE UP (ref 32–36)
MCV RBC AUTO: 86.6 FL — SIGNIFICANT CHANGE UP (ref 80–100)
MICROCYTES BLD QL: SLIGHT — SIGNIFICANT CHANGE UP
MONOCYTES # BLD AUTO: 0.11 K/UL — SIGNIFICANT CHANGE UP (ref 0–0.9)
MONOCYTES NFR BLD AUTO: 2 % — SIGNIFICANT CHANGE UP (ref 2–14)
NEUTROPHILS # BLD AUTO: 4.54 K/UL — SIGNIFICANT CHANGE UP (ref 1.8–7.4)
NEUTROPHILS NFR BLD AUTO: 79 % — HIGH (ref 43–77)
NEUTS BAND # BLD: 2 % — SIGNIFICANT CHANGE UP (ref 0–8)
NRBC # BLD: 0 /100 — SIGNIFICANT CHANGE UP (ref 0–0)
PLAT MORPH BLD: NORMAL — SIGNIFICANT CHANGE UP
PLATELET # BLD AUTO: 131 K/UL — LOW (ref 150–400)
POIKILOCYTOSIS BLD QL AUTO: SLIGHT — SIGNIFICANT CHANGE UP
POTASSIUM SERPL-MCNC: 3 MMOL/L — LOW (ref 3.5–5.3)
POTASSIUM SERPL-SCNC: 3 MMOL/L — LOW (ref 3.5–5.3)
PROT SERPL-MCNC: 6.5 G/DL — SIGNIFICANT CHANGE UP (ref 6–8.3)
RBC # BLD: 4.02 M/UL — SIGNIFICANT CHANGE UP (ref 3.8–5.2)
RBC # FLD: 19.3 % — HIGH (ref 10.3–14.5)
RBC BLD AUTO: ABNORMAL
SODIUM SERPL-SCNC: 153 MMOL/L — HIGH (ref 135–145)
SPECIMEN SOURCE: SIGNIFICANT CHANGE UP
TARGETS BLD QL SMEAR: SLIGHT — SIGNIFICANT CHANGE UP
VARIANT LYMPHS # BLD: 2 % — SIGNIFICANT CHANGE UP (ref 0–6)
WBC # BLD: 5.6 K/UL — SIGNIFICANT CHANGE UP (ref 3.8–10.5)
WBC # FLD AUTO: 5.6 K/UL — SIGNIFICANT CHANGE UP (ref 3.8–10.5)

## 2020-03-31 PROCEDURE — 99233 SBSQ HOSP IP/OBS HIGH 50: CPT

## 2020-03-31 RX ORDER — SODIUM CHLORIDE 9 MG/ML
500 INJECTION INTRAMUSCULAR; INTRAVENOUS; SUBCUTANEOUS ONCE
Refills: 0 | Status: DISCONTINUED | OUTPATIENT
Start: 2020-03-31 | End: 2020-03-31

## 2020-03-31 RX ORDER — SODIUM CHLORIDE 9 MG/ML
1000 INJECTION, SOLUTION INTRAVENOUS
Refills: 0 | Status: DISCONTINUED | OUTPATIENT
Start: 2020-03-31 | End: 2020-04-01

## 2020-03-31 RX ORDER — MIDODRINE HYDROCHLORIDE 2.5 MG/1
10 TABLET ORAL EVERY 8 HOURS
Refills: 0 | Status: DISCONTINUED | OUTPATIENT
Start: 2020-03-31 | End: 2020-04-05

## 2020-03-31 RX ORDER — SODIUM BICARBONATE 1 MEQ/ML
650 SYRINGE (ML) INTRAVENOUS THREE TIMES A DAY
Refills: 0 | Status: DISCONTINUED | OUTPATIENT
Start: 2020-03-31 | End: 2020-04-01

## 2020-03-31 RX ORDER — POTASSIUM CHLORIDE 20 MEQ
40 PACKET (EA) ORAL ONCE
Refills: 0 | Status: COMPLETED | OUTPATIENT
Start: 2020-03-31 | End: 2020-03-31

## 2020-03-31 RX ORDER — CEFTRIAXONE 500 MG/1
1000 INJECTION, POWDER, FOR SOLUTION INTRAMUSCULAR; INTRAVENOUS EVERY 24 HOURS
Refills: 0 | Status: DISCONTINUED | OUTPATIENT
Start: 2020-03-31 | End: 2020-04-03

## 2020-03-31 RX ADMIN — CEFTRIAXONE 100 MILLIGRAM(S): 500 INJECTION, POWDER, FOR SOLUTION INTRAMUSCULAR; INTRAVENOUS at 06:13

## 2020-03-31 RX ADMIN — MIDODRINE HYDROCHLORIDE 5 MILLIGRAM(S): 2.5 TABLET ORAL at 00:45

## 2020-03-31 RX ADMIN — SODIUM CHLORIDE 100 MILLILITER(S): 9 INJECTION, SOLUTION INTRAVENOUS at 16:32

## 2020-03-31 RX ADMIN — HEPARIN SODIUM 5000 UNIT(S): 5000 INJECTION INTRAVENOUS; SUBCUTANEOUS at 16:34

## 2020-03-31 RX ADMIN — SODIUM CHLORIDE 75 MILLILITER(S): 9 INJECTION, SOLUTION INTRAVENOUS at 06:14

## 2020-03-31 RX ADMIN — MIDODRINE HYDROCHLORIDE 10 MILLIGRAM(S): 2.5 TABLET ORAL at 23:25

## 2020-03-31 RX ADMIN — Medication 100 MILLIEQUIVALENT(S): at 01:29

## 2020-03-31 RX ADMIN — Medication 1000 MILLIGRAM(S): at 00:44

## 2020-03-31 RX ADMIN — HEPARIN SODIUM 5000 UNIT(S): 5000 INJECTION INTRAVENOUS; SUBCUTANEOUS at 06:13

## 2020-03-31 RX ADMIN — ATORVASTATIN CALCIUM 40 MILLIGRAM(S): 80 TABLET, FILM COATED ORAL at 00:45

## 2020-03-31 RX ADMIN — Medication 100 MILLIEQUIVALENT(S): at 00:20

## 2020-03-31 RX ADMIN — ATORVASTATIN CALCIUM 40 MILLIGRAM(S): 80 TABLET, FILM COATED ORAL at 23:24

## 2020-03-31 NOTE — PROGRESS NOTE ADULT - SUBJECTIVE AND OBJECTIVE BOX
Progress: looks comfortable, remains mostly lethargic ,weakened     Present Symptoms:   Dyspnea: mild  Nausea/Vomiting:   Anxiety:    Depressed Mood:   Fatigue: yes  Loss of appetite:   Pain:                   location:   Review of Systems: Unable to obtain due to poor mentation]    MEDICATIONS  (STANDING):  aspirin  chewable 81 milliGRAM(s) Oral daily  atorvastatin 40 milliGRAM(s) Oral at bedtime  azithromycin   Tablet 250 milliGRAM(s) Oral daily  cefTRIAXone   IVPB 1000 milliGRAM(s) IV Intermittent every 24 hours  clopidogrel Tablet 75 milliGRAM(s) Oral daily  dextrose 5% 1000 milliLiter(s) (100 mL/Hr) IV Continuous <Continuous>  epoetin chayito Injectable 57277 Unit(s) SubCutaneous every 7 days  folic acid 1 milliGRAM(s) Oral daily  heparin  Injectable 5000 Unit(s) SubCutaneous every 12 hours  hydroxychloroquine 200 milliGRAM(s) Oral every 12 hours  hydroxychloroquine   Oral   midodrine. 10 milliGRAM(s) Oral every 8 hours  multivitamin 1 Tablet(s) Oral daily  potassium chloride    Tablet ER 20 milliEquivalent(s) Oral once  sodium bicarbonate 650 milliGRAM(s) Oral three times a day  thiamine 100 milliGRAM(s) Oral daily  zinc sulfate 220 milliGRAM(s) Oral daily    MEDICATIONS  (PRN):  acetaminophen   Tablet .. 650 milliGRAM(s) Oral every 6 hours PRN Temp greater or equal to 38C (100.4F), Mild Pain (1 - 3)  acetaminophen  Suppository .. 650 milliGRAM(s) Rectal every 8 hours PRN Temp greater or equal to 38C (100.4F), Mild Pain (1 - 3)  oxycodone    5 mG/acetaminophen 325 mG 1 Tablet(s) Oral every 6 hours PRN Severe Pain (7 - 10)      PHYSICAL EXAM:  deferred, examined by md earlier  Vital Signs Last 24 Hrs  T(C): 36.7 (31 Mar 2020 00:20), Max: 36.7 (31 Mar 2020 00:20)  T(F): 98.1 (31 Mar 2020 00:20), Max: 98.1 (31 Mar 2020 00:20)  HR: 95 (31 Mar 2020 11:24) (78 - 95)  BP: 91/59 (31 Mar 2020 11:24) (85/35 - 97/37)  BP(mean): --  RR: 18 (31 Mar 2020 11:24) (18 - 18)  SpO2: 97% (31 Mar 2020 11:24) (93% - 97%)  General: weakened ,       HEENT:     Lungs:    CV:   GI:     :     Musculoskeletal:    Skin:     Neuro: +deficits   Oral intake ability:  oral feeding, needs assist   Diet: as joanna      LABS:                          11.3   5.60  )-----------( 131      ( 31 Mar 2020 08:02 )             34.8     03-31    153<H>  |  119<H>  |  50<H>  ----------------------------<  97  3.0<L>   |  17<L>  |  2.22<H>    Ca    7.5<L>      31 Mar 2020 08:02  Mg     2.3     03-31    TPro  6.5  /  Alb  1.6<L>  /  TBili  0.4  /  DBili  x   /  AST  73<H>  /  ALT  18  /  AlkPhos  176<H>  03-31        RADIOLOGY & ADDITIONAL STUDIES:    ADVANCE DIRECTIVES: MOLST  DNR/DNI   Advanced Care Planning discussion total time spent:

## 2020-03-31 NOTE — PROGRESS NOTE ADULT - SUBJECTIVE AND OBJECTIVE BOX
Patient is a 84y old  Female who presents with a chief complaint of weakness, lethargy, decreased oral intake (30 Mar 2020 15:47)    Feels okay.  Confused but conversant.      Patient seen and examined at bedside.    ALLERGIES:  Berries (Angioedema (Mild to Mod))  sulfa drugs (Rash)    MEDICATIONS  (STANDING):  ascorbic acid 1000 milliGRAM(s) Oral three times a day  aspirin  chewable 81 milliGRAM(s) Oral daily  atorvastatin 40 milliGRAM(s) Oral at bedtime  azithromycin   Tablet 250 milliGRAM(s) Oral daily  clopidogrel Tablet 75 milliGRAM(s) Oral daily  dextrose 5% 1000 milliLiter(s) (75 mL/Hr) IV Continuous <Continuous>  epoetin chayito Injectable 18459 Unit(s) SubCutaneous every 7 days  folic acid 1 milliGRAM(s) Oral daily  heparin  Injectable 5000 Unit(s) SubCutaneous every 12 hours  hydroxychloroquine 200 milliGRAM(s) Oral every 12 hours  hydroxychloroquine   Oral   midodrine. 5 milliGRAM(s) Oral every 8 hours  multivitamin 1 Tablet(s) Oral daily  potassium chloride    Tablet ER 20 milliEquivalent(s) Oral once  sodium chloride 0.9% Bolus 500 milliLiter(s) IV Bolus once  thiamine 100 milliGRAM(s) Oral daily  zinc sulfate 220 milliGRAM(s) Oral daily    MEDICATIONS  (PRN):  acetaminophen   Tablet .. 650 milliGRAM(s) Oral every 6 hours PRN Temp greater or equal to 38C (100.4F), Mild Pain (1 - 3)  acetaminophen  Suppository .. 650 milliGRAM(s) Rectal every 8 hours PRN Temp greater or equal to 38C (100.4F), Mild Pain (1 - 3)  oxycodone    5 mG/acetaminophen 325 mG 1 Tablet(s) Oral every 6 hours PRN Severe Pain (7 - 10)    Vital Signs Last 24 Hrs  T(F): 98.1 (31 Mar 2020 00:20), Max: 98.1 (31 Mar 2020 00:20)  HR: 79 (31 Mar 2020 01:30) (78 - 79)  BP: 97/37 (31 Mar 2020 01:30) (85/35 - 97/37)  RR: 18 (31 Mar 2020 00:20) (18 - 18)  SpO2: 93% (31 Mar 2020 00:20) (93% - 93%)  I&O's Summary    29 Mar 2020 07:01  -  30 Mar 2020 07:00  --------------------------------------------------------  IN: 770 mL / OUT: 1020 mL / NET: -250 mL    30 Mar 2020 07:01  -  31 Mar 2020 06:40  --------------------------------------------------------  IN: 1025 mL / OUT: 300 mL / NET: 725 mL    PHYSICAL EXAM:  General: NAD, A/O x 1  ENT: MMM  Neck: Supple, No JVD  Lungs: Clear to auscultation bilaterally  Cardio: RRR, S1/S2, No murmurs  Abdomen: Soft, Nontender, Nondistended; Bowel sounds present  Extremities: No calf tenderness, No pitting edema    LABS:             11.8   5.92  )-----------( 124      ( 30 Mar 2020 10:04 )             35.9       03-30  152  |  120  |  54  ----------------------------<  115  2.7   |  18  |  2.37    Ca    7.1      30 Mar 2020 18:00  Mg     1.4     03-30    TPro  6.5  /  Alb  1.6  /  TBili  0.3  /  DBili  x   /  AST  70  /  ALT  21  /  AlkPhos  179  03-30     eGFR if Non African American: 18 mL/min/1.73M2 (03-30-20 @ 18:00)  eGFR if : 21 mL/min/1.73M2 (03-30-20 @ 18:00)    03-29 [id number] 5.8    Culture - Blood (collected 29 Mar 2020 07:25)  Source: .Blood Blood-Peripheral  Preliminary Report (30 Mar 2020 11:02):    No growth to date.    Culture - Urine (collected 27 Mar 2020 14:06)  Source: .Urine Clean Catch (Midstream)  Final Report (28 Mar 2020 19:37):    <10,000 CFU/mL Normal Urogenital May    Culture - Blood (collected 26 Mar 2020 21:10)  Source: .Blood Blood-Peripheral  Preliminary Report (28 Mar 2020 10:01):    No growth to date.    Culture - Blood (collected 26 Mar 2020 21:10)  Source: .Blood Blood-Peripheral  Gram Stain (27 Mar 2020 22:43):    Growth in anaerobic bottle: Gram Negative Rods  Final Report (29 Mar 2020 13:49):    Growth in anaerobic bottle: Klebsiella pneumoniae    ***Blood Panel PCR results on this specimen are available    approximately 3 hours after the Gram stain result.***    Gram stain, PCR, and/or culture results may not always    correspond due to difference in methodologies.    ************************************************************    This PCR assay was performed using [employer].    The following targets are tested for: Enterococcus,    vancomycin resistant enterococci, Listeria monocytogenes,    coagulase negative staphylococci, S. aureus,    methicillin resistant S. aureus, Streptococcus agalactiae    (Group B), S. pneumoniae, S. pyogenes (Group A),    Acinetobacter baumannii, Enterobacter cloacae, E. coli,    Klebsiella oxytoca, K. pneumoniae, Proteus sp.,    Serratia marcescens, Haemophilus influenzae,    Neisseria meningitidis, Pseudomonas aeruginosa, Candida    albicans, C. glabrata, C krusei, C parapsilosis,    C. tropicalis and the KPC resistance gene.  Organism: Blood Culture PCR  Klebsiella pneumoniae (29 Mar 2020 13:49)  Organism: Klebsiella pneumoniae (29 Mar 2020 13:49)      -  Amikacin: S <=16      -  Ampicillin: R 16 These ampicillin results predict results for amoxicillin      -  Ampicillin/Sulbactam: S <=4/2 Enterobacter, Citrobacter, and Serratia may develop resistance during prolonged therapy (3-4 days)      -  Aztreonam: S <=4      -  Cefazolin: S <=2 Enterobacter, Citrobacter, and Serratia may develop resistance during prolonged therapy (3-4 days)      -  Cefepime: S <=2      -  Cefoxitin: S <=8      -  Ceftriaxone: S <=1 Enterobacter, Citrobacter, and Serratia may develop resistance during prolonged therapy      -  Ciprofloxacin: S <=1      -  Ertapenem: S <=0.5      -  Gentamicin: S <=2      -  Imipenem: S <=1      -  Levofloxacin: S <=2      -  Meropenem: S <=1      -  Piperacillin/Tazobactam: S <=8      -  Tobramycin: S <=2      -  Trimethoprim/Sulfamethoxazole: S <=2/38      Method Type: ROSAMARIA  Organism: Blood Culture PCR (29 Mar 2020 13:49)      -  Klebsiella pneumoniae: Detec      Method Type: PCR        RADIOLOGY & ADDITIONAL TESTS:    Care Discussed with Consultants/Other Providers:

## 2020-03-31 NOTE — PROGRESS NOTE ADULT - ASSESSMENT
84 years old female with history of CKD, Anemia of chronic disease, Arthritis, GOUT, CAD/HLD, right Lung Mass, sacral decub, presents with weakness, decreased po intake, hypotension, +acute renal failure, hypernatremia, ATN.    COVID Positive 3/26  Gram negative bacteremia, Klebsiella (possible pneumonia source)  Superimposed bacterial pneumonia suspected  - azithromycin/plaquenil day 4/5  3/29/20 blood cultures negative     Hypotension - resolved  -keep MAP > 65, s/p multiple liter boluses   -midodrine for now, taper when/as tolerated     SHAWN on CKD stage 3, baseline cr 1.28 as of 10/2019  Hypokalemia  Hypernatremia  Suspect hypotensive ATN  slowly improving  Renal following  Replete K+ for hypokalemia and replete hypomagnesemia     Low folate   -will supplement    CAD (coronary artery disease)  With history of cardiac stents, continue medical management with ASA, Plavi. Add statin.     Type 2 diabetes mellitus  by history, A1C in pre-diabetic range, will not check fingersticks to minimize exposure risk in this patient   03-29 [id number] 5.8    HLD (hyperlipidemia)  statin     # Rheumatoid arthritis  Supportive care, analgesia PRN.     # DVT prophylaxis  Heparin sq.    DNR/DNI , palliative following    Son Andrea updated.

## 2020-03-31 NOTE — PROGRESS NOTE ADULT - SUBJECTIVE AND OBJECTIVE BOX
Resting    Vital Signs Last 24 Hrs  T(C): 36.7 (03-31-20 @ 00:20), Max: 36.7 (03-31-20 @ 00:20)  T(F): 98.1 (03-31-20 @ 00:20), Max: 98.1 (03-31-20 @ 00:20)  HR: 79 (03-31-20 @ 01:30) (78 - 79)  BP: 97/37 (03-31-20 @ 01:30) (85/35 - 97/37)  RR: 18 (03-31-20 @ 00:20) (18 - 18)  SpO2: 93% (03-31-20 @ 00:20) (93% - 93%)    PE: deferred due to strict isolation precautions  Visually no edema                                11.3   5.60  )-----------( 131      ( 31 Mar 2020 08:02 )             34.8     31 Mar 2020 08:02    153    |  119    |  50     ----------------------------<  97     3.0     |  17     |  2.22     Ca    7.5        31 Mar 2020 08:02  Mg     2.3       31 Mar 2020 08:02    TPro  6.5    /  Alb  1.6    /  TBili  0.4    /  DBili  x      /  AST  73     /  ALT  18     /  AlkPhos  176    31 Mar 2020 08:02    LIVER FUNCTIONS - ( 31 Mar 2020 08:02 )  Alb: 1.6 g/dL / Pro: 6.5 g/dL / ALK PHOS: 176 U/L / ALT: 18 U/L / AST: 73 U/L / GGT: x           acetaminophen   Tablet .. 650 milliGRAM(s) Oral every 6 hours PRN  acetaminophen  Suppository .. 650 milliGRAM(s) Rectal every 8 hours PRN  ascorbic acid 1000 milliGRAM(s) Oral three times a day  aspirin  chewable 81 milliGRAM(s) Oral daily  atorvastatin 40 milliGRAM(s) Oral at bedtime  azithromycin   Tablet 250 milliGRAM(s) Oral daily  cefTRIAXone   IVPB 1000 milliGRAM(s) IV Intermittent every 24 hours  clopidogrel Tablet 75 milliGRAM(s) Oral daily  dextrose 5% 1000 milliLiter(s) IV Continuous <Continuous>  epoetin chayito Injectable 86894 Unit(s) SubCutaneous every 7 days  folic acid 1 milliGRAM(s) Oral daily  heparin  Injectable 5000 Unit(s) SubCutaneous every 12 hours  hydroxychloroquine 200 milliGRAM(s) Oral every 12 hours  hydroxychloroquine   Oral   midodrine. 10 milliGRAM(s) Oral every 8 hours  multivitamin 1 Tablet(s) Oral daily  oxycodone    5 mG/acetaminophen 325 mG 1 Tablet(s) Oral every 6 hours PRN  potassium chloride    Tablet ER 20 milliEquivalent(s) Oral once  thiamine 100 milliGRAM(s) Oral daily  zinc sulfate 220 milliGRAM(s) Oral daily    A/P:    R lung mass, PNA, + COVID  Adm w/severe dehydration, FTT, pre-renal SHAWN on CKD 3 (Cr 1.28 - 10/16/19)  Renal fx is improving w/IVF  Free water deficit ~ 3L  D5W w/KCl at 100cc/hr  Borderline BP, Midodrine 10mg TID  Goal SBP > 90  Suppl K  F/u BMP, Mg level  Na Bicarb TID  No nephrotoxins  Overall prognosis is guarded/poor  Palliative eval most appropriate

## 2020-03-31 NOTE — PROGRESS NOTE ADULT - ASSESSMENT
84 year old female with multiple medical problems as listed above was brought from nursing home to the ER for chief complaint of abnormal labs. She was found to have an elevated  creatine   Further workup showed that she tested positive for COVID 19, and her blood cultures tested positive for gram neg chris now identified as Klebsiella pneumoniae 1 of 4 bottles todate.  She has been started on Ceftriaxone. She was also started on Plaquenil and Zithromax for COVID tx   The primary focus for bacteremia not clear bas urine culture sent from the ER.  Her bacteremia appears controlled and she has no abdominal pain.  Suspect  focus for bacteremia which appears controlled  Chest CT with lung  mass.  Her oxygenation has been acceptable  Plan   ·	day 4 of 5 of combination Plaquenil and Zithromax for the treatment of COVID  ·	Continue with Ceftriaxone 1 g IV q24 for the treatment of Klebsiella bacteremia , day 4, favor 7 days totral  ·	continue supportive care as per medicine team.   ·	respiratory status seems stable  ·	7 days total for klebsiella bacteremia.No additional ID w/u planned, we will stop actively following, please call if ID issues arise

## 2020-03-31 NOTE — PROGRESS NOTE ADULT - ASSESSMENT
A/P 84 y o  female  from [facility] , mult co morbidities, including history of right Lung Mass, now presents with weakness,  dec po intake, hypotension, +acute renal failure, hypernatremia, ATN  Elev Lactate - hypotension - ?sepsis,   +Lung mass -  increased in size, ?likely malignant -pt refused bx in past  from notes in [facility] - was eval by Pulm in the past    Currently on IV antbx for sepsis and IV hydration for PNA  .   per ID :   Further workup showed that she tested positive for COVID 19, and her blood cultures tested positive for gram neg chris now identified as Klebsiella pneumoniae .  She has been started on Ceftriaxone. She was also started on Plaquenil and Zithromax for COVID tx   The primary focus for bacteremia not clear but nor urine culture sent from the ER.  Her bacteremia appears controlled and she has no abdominal pain.  Chest CT with lung  mass.  Plan   ·	day 3 of 5 of combination Plaquenil and Zithromax for the treatment of COVID  ·	Continue with Ceftriaxone 1 g IV q24 for the treatment of Klebsiella bacteremia   ·	continue supportive care as per medicine team.   ·	respiratory status seems stable    3/29/20 blood cultures negative     *Palliative - f/u pt sl improving , case reviewed w/ med team.  Pt comfortable, no sob.   [facility] established  DNR/DNI . Call placed to son Andrea , await call back.  No clinical recs at this time. Will follow pts clinical course, cont supportive care . A/P 84 y o  female  from [facility] , mult co morbidities, including history of right Lung Mass, now presents with weakness,  dec po intake, hypotension, +acute renal failure, hypernatremia, ATN  Elev Lactate - hypotension - ?sepsis,   +Lung mass -  increased in size, ?likely malignant -pt refused bx in past  from notes in [facility] - was eval by Pulm in the past    Currently on IV antbx for sepsis and IV hydration for PNA  .   per ID :   Further workup showed that she tested positive for COVID 19, and her blood cultures tested positive for gram neg chris now identified as Klebsiella pneumoniae .  She has been started on Ceftriaxone. She was also started on Plaquenil and Zithromax for COVID tx   The primary focus for bacteremia not clear but nor urine culture sent from the ER.  Her bacteremia appears controlled and she has no abdominal pain.  Chest CT with lung  mass.  Plan   ·	day 3 of 5 of combination Plaquenil and Zithromax for the treatment of COVID  ·	Continue with Ceftriaxone 1 g IV q24 for the treatment of Klebsiella bacteremia   ·	continue supportive care as per medicine team.   ·	respiratory status seems stable    3/29/20 blood cultures negative     *Palliative - f/u pt sl improving , case reviewed w/ med team.  Pt comfortable, no sob.   [facility] established  DNR/DNI . Call placed to son Andrea , await call back.  No clinical recs at this time. Will follow pts clinical course, cont supportive care .   .

## 2020-03-31 NOTE — PROGRESS NOTE ADULT - SUBJECTIVE AND OBJECTIVE BOX
CC: f/u for klebsiella bacteremia and Cod 19 infection    Patient reports: she is pleasantly demented, not able to answer questions    REVIEW OF SYSTEMS:  All other review of systems negative (Comprehensive ROS): limited by dementia    Antimicrobials Day #  :day 4  azithromycin   Tablet 250 milliGRAM(s) Oral daily  cefTRIAXone   IVPB 1000 milliGRAM(s) IV Intermittent every 24 hours  hydroxychloroquine 200 milliGRAM(s) Oral every 12 hours  hydroxychloroquine   Oral     Other Medications Reviewed    T(F): 98.1 (03-31-20 @ 00:20), Max: 98.1 (03-31-20 @ 00:20)  HR: 95 (03-31-20 @ 11:24)  BP: 91/59 (03-31-20 @ 11:24)  RR: 18 (03-31-20 @ 11:24)  SpO2: 97% (03-31-20 @ 11:24)  Wt(kg): --    PHYSICAL EXAM:  General: alert, no acute distress, chronically ill appearing  Eyes:  anicteric, no conjunctival injection, no discharge  Oropharynx: no lesions or injection 	  Neck: supple, without adenopathy  Lungs: clear to auscultation  Heart: regular rate and rhythm; no murmur, rubs or gallops  Abdomen: soft, nondistended, nontender, without mass or organomegaly  Skin: no lesions  Extremities: no clubbing, cyanosis, or edema  Neurologic: alert, restless and slightly agitated    LAB RESULTS:                        11.3   5.60  )-----------( 131      ( 31 Mar 2020 08:02 )             34.8     03-31    153<H>  |  119<H>  |  50<H>  ----------------------------<  97  3.0<L>   |  17<L>  |  2.22<H>    Ca    7.5<L>      31 Mar 2020 08:02  Mg     2.3     03-31    TPro  6.5  /  Alb  1.6<L>  /  TBili  0.4  /  DBili  x   /  AST  73<H>  /  ALT  18  /  AlkPhos  176<H>  03-31    LIVER FUNCTIONS - ( 31 Mar 2020 08:02 )  Alb: 1.6 g/dL / Pro: 6.5 g/dL / ALK PHOS: 176 U/L / ALT: 18 U/L / AST: 73 U/L / GGT: x             MICROBIOLOGY:  RECENT CULTURES:  03-29 @ 07:25 .Blood Blood-Peripheral     No growth to date.      03-27 @ 14:06 .Urine Clean Catch (Midstream)     <10,000 CFU/mL Normal Urogenital May      03-26 @ 21:10 .Blood Blood-Peripheral Blood Culture PCR  Klebsiella pneumoniae    Growth in anaerobic bottle: Klebsiella pneumoniae  ,      Growth in anaerobic bottle: Gram Negative Rods        RADIOLOGY REVIEWED:    < from: Xray Chest 1 View-PORTABLE IMMEDIATE (03.26.20 @ 21:47) >  Impression:    Findings as discussed above.   Recommend further clinical correlation and further evaluation with chest CT scan and is clinically indicated.    < end of copied text >

## 2020-04-01 LAB
ALBUMIN SERPL ELPH-MCNC: 1.5 G/DL — LOW (ref 3.3–5)
ALP SERPL-CCNC: 171 U/L — HIGH (ref 40–120)
ALT FLD-CCNC: 21 U/L — SIGNIFICANT CHANGE UP (ref 10–45)
ANION GAP SERPL CALC-SCNC: 15 MMOL/L — SIGNIFICANT CHANGE UP (ref 5–17)
AST SERPL-CCNC: 67 U/L — HIGH (ref 10–40)
BASOPHILS # BLD AUTO: 0.04 K/UL — SIGNIFICANT CHANGE UP (ref 0–0.2)
BASOPHILS NFR BLD AUTO: 0.4 % — SIGNIFICANT CHANGE UP (ref 0–2)
BILIRUB SERPL-MCNC: 0.4 MG/DL — SIGNIFICANT CHANGE UP (ref 0.2–1.2)
BUN SERPL-MCNC: 42 MG/DL — HIGH (ref 7–23)
CALCIUM SERPL-MCNC: 7.6 MG/DL — LOW (ref 8.4–10.5)
CHLORIDE SERPL-SCNC: 114 MMOL/L — HIGH (ref 96–108)
CO2 SERPL-SCNC: 15 MMOL/L — LOW (ref 22–31)
CREAT SERPL-MCNC: 2.21 MG/DL — HIGH (ref 0.5–1.3)
CRP SERPL-MCNC: 20.87 MG/DL — HIGH (ref 0–0.4)
D DIMER BLD IA.RAPID-MCNC: 4873 NG/ML DDU — HIGH
EOSINOPHIL # BLD AUTO: 0.01 K/UL — SIGNIFICANT CHANGE UP (ref 0–0.5)
EOSINOPHIL NFR BLD AUTO: 0.1 % — SIGNIFICANT CHANGE UP (ref 0–6)
FERRITIN SERPL-MCNC: 1027 NG/ML — HIGH (ref 15–150)
GLUCOSE SERPL-MCNC: 171 MG/DL — HIGH (ref 70–99)
HCT VFR BLD CALC: 34.1 % — LOW (ref 34.5–45)
HGB BLD-MCNC: 10.9 G/DL — LOW (ref 11.5–15.5)
IMM GRANULOCYTES NFR BLD AUTO: 1.1 % — SIGNIFICANT CHANGE UP (ref 0–1.5)
LDH SERPL L TO P-CCNC: 566 U/L — HIGH (ref 50–242)
LYMPHOCYTES # BLD AUTO: 0.85 K/UL — LOW (ref 1–3.3)
LYMPHOCYTES # BLD AUTO: 8.9 % — LOW (ref 13–44)
MCHC RBC-ENTMCNC: 28.2 PG — SIGNIFICANT CHANGE UP (ref 27–34)
MCHC RBC-ENTMCNC: 32 GM/DL — SIGNIFICANT CHANGE UP (ref 32–36)
MCV RBC AUTO: 88.3 FL — SIGNIFICANT CHANGE UP (ref 80–100)
MONOCYTES # BLD AUTO: 0.09 K/UL — SIGNIFICANT CHANGE UP (ref 0–0.9)
MONOCYTES NFR BLD AUTO: 0.9 % — LOW (ref 2–14)
NEUTROPHILS # BLD AUTO: 8.42 K/UL — HIGH (ref 1.8–7.4)
NEUTROPHILS NFR BLD AUTO: 88.6 % — HIGH (ref 43–77)
NRBC # BLD: 0 /100 WBCS — SIGNIFICANT CHANGE UP (ref 0–0)
PLATELET # BLD AUTO: 108 K/UL — LOW (ref 150–400)
POTASSIUM SERPL-MCNC: 3.3 MMOL/L — LOW (ref 3.5–5.3)
POTASSIUM SERPL-SCNC: 3.3 MMOL/L — LOW (ref 3.5–5.3)
PROCALCITONIN SERPL-MCNC: 2.21 NG/ML — HIGH
PROT SERPL-MCNC: 6.1 G/DL — SIGNIFICANT CHANGE UP (ref 6–8.3)
RBC # BLD: 3.86 M/UL — SIGNIFICANT CHANGE UP (ref 3.8–5.2)
RBC # FLD: 19.3 % — HIGH (ref 10.3–14.5)
SODIUM SERPL-SCNC: 144 MMOL/L — SIGNIFICANT CHANGE UP (ref 135–145)
WBC # BLD: 9.51 K/UL — SIGNIFICANT CHANGE UP (ref 3.8–10.5)
WBC # FLD AUTO: 9.51 K/UL — SIGNIFICANT CHANGE UP (ref 3.8–10.5)

## 2020-04-01 PROCEDURE — 99232 SBSQ HOSP IP/OBS MODERATE 35: CPT

## 2020-04-01 RX ORDER — SODIUM CHLORIDE 9 MG/ML
1000 INJECTION, SOLUTION INTRAVENOUS
Refills: 0 | Status: DISCONTINUED | OUTPATIENT
Start: 2020-04-01 | End: 2020-04-02

## 2020-04-01 RX ORDER — SODIUM BICARBONATE 1 MEQ/ML
650 SYRINGE (ML) INTRAVENOUS
Refills: 0 | Status: DISCONTINUED | OUTPATIENT
Start: 2020-04-01 | End: 2020-04-04

## 2020-04-01 RX ADMIN — CEFTRIAXONE 100 MILLIGRAM(S): 500 INJECTION, POWDER, FOR SOLUTION INTRAMUSCULAR; INTRAVENOUS at 09:47

## 2020-04-01 RX ADMIN — Medication 650 MILLIGRAM(S): at 06:59

## 2020-04-01 RX ADMIN — HEPARIN SODIUM 5000 UNIT(S): 5000 INJECTION INTRAVENOUS; SUBCUTANEOUS at 06:59

## 2020-04-01 RX ADMIN — MIDODRINE HYDROCHLORIDE 10 MILLIGRAM(S): 2.5 TABLET ORAL at 06:59

## 2020-04-01 NOTE — PROGRESS NOTE ADULT - SUBJECTIVE AND OBJECTIVE BOX
Resting    Vital Signs Last 24 Hrs  T(C): 37.1 (03-31-20 @ 22:40), Max: 37.1 (03-31-20 @ 22:40)  T(F): 98.8 (03-31-20 @ 22:40), Max: 98.8 (03-31-20 @ 22:40)  HR: 89 (04-01-20 @ 07:11) (89 - 97)  BP: 97/62 (04-01-20 @ 07:11) (96/64 - 97/62)  RR: 18 (03-31-20 @ 22:40) (18 - 18)  SpO2: 96% (03-31-20 @ 22:40) (96% - 96%)    PE: deferred due to strict isolation precautions  Visually no edema                                        10.9   9.51  )-----------( 108      ( 01 Apr 2020 07:59 )             34.1     01 Apr 2020 07:59    144    |  114    |  42     ----------------------------<  171    3.3     |  15     |  2.21     Ca    7.6        01 Apr 2020 07:59  Mg     2.3       31 Mar 2020 08:02    TPro  6.1    /  Alb  1.5    /  TBili  0.4    /  DBili  x      /  AST  67     /  ALT  21     /  AlkPhos  171    01 Apr 2020 07:59    LIVER FUNCTIONS - ( 01 Apr 2020 07:59 )  Alb: 1.5 g/dL / Pro: 6.1 g/dL / ALK PHOS: 171 U/L / ALT: 21 U/L / AST: 67 U/L / GGT: x           acetaminophen   Tablet .. 650 milliGRAM(s) Oral every 6 hours PRN  acetaminophen  Suppository .. 650 milliGRAM(s) Rectal every 8 hours PRN  aspirin  chewable 81 milliGRAM(s) Oral daily  atorvastatin 40 milliGRAM(s) Oral at bedtime  cefTRIAXone   IVPB 1000 milliGRAM(s) IV Intermittent every 24 hours  clopidogrel Tablet 75 milliGRAM(s) Oral daily  dextrose 5% 1000 milliLiter(s) IV Continuous <Continuous>  epoetin chayito Injectable 93264 Unit(s) SubCutaneous every 7 days  folic acid 1 milliGRAM(s) Oral daily  heparin  Injectable 5000 Unit(s) SubCutaneous every 12 hours  hydroxychloroquine 200 milliGRAM(s) Oral every 12 hours  hydroxychloroquine   Oral   midodrine. 10 milliGRAM(s) Oral every 8 hours  multivitamin 1 Tablet(s) Oral daily  oxycodone    5 mG/acetaminophen 325 mG 1 Tablet(s) Oral every 6 hours PRN  potassium chloride    Tablet ER 20 milliEquivalent(s) Oral once  sodium bicarbonate 650 milliGRAM(s) Oral three times a day  thiamine 100 milliGRAM(s) Oral daily  zinc sulfate 220 milliGRAM(s) Oral daily    A/P:    R lung mass, PNA, + COVID, s/p Kleb bacteremia  Adm w/severe dehydration, FTT, pre-renal SHAWN on CKD 3 (Cr 1.28 - 10/16/19)  Renal fx, Na are improving w/IVF  Borderline BP, Midodrine 10mg TID  Goal SBP > 90  Suppl K  F/u BMP, Mg   Na Bicarb QID  Abx per ID  No nephrotoxins  Overall prognosis is guarded/poor  Palliative eval most appropriate

## 2020-04-01 NOTE — PROGRESS NOTE ADULT - SUBJECTIVE AND OBJECTIVE BOX
Patient is a 84y old  Female who presents with a chief complaint of weakness, lethargy, decreased oral intake (31 Mar 2020 16:57)    Interval Hx  Patient seen and examined at bedside. Pt confused, moaning , minimally responsive . was saturating 80 in RA on my assessment this morning.      ALLERGIES:  Berries (Angioedema (Mild to Mod))  sulfa drugs (Rash)    MEDICATIONS  (STANDING):  aspirin  chewable 81 milliGRAM(s) Oral daily  atorvastatin 40 milliGRAM(s) Oral at bedtime  cefTRIAXone   IVPB 1000 milliGRAM(s) IV Intermittent every 24 hours  clopidogrel Tablet 75 milliGRAM(s) Oral daily  dextrose 5% 1000 milliLiter(s) (100 mL/Hr) IV Continuous <Continuous>  epoetin chayito Injectable 60327 Unit(s) SubCutaneous every 7 days  folic acid 1 milliGRAM(s) Oral daily  heparin  Injectable 5000 Unit(s) SubCutaneous every 12 hours  hydroxychloroquine 200 milliGRAM(s) Oral every 12 hours  hydroxychloroquine   Oral   midodrine. 10 milliGRAM(s) Oral every 8 hours  multivitamin 1 Tablet(s) Oral daily  potassium chloride    Tablet ER 20 milliEquivalent(s) Oral once  sodium bicarbonate 650 milliGRAM(s) Oral three times a day  thiamine 100 milliGRAM(s) Oral daily  zinc sulfate 220 milliGRAM(s) Oral daily    MEDICATIONS  (PRN):  acetaminophen   Tablet .. 650 milliGRAM(s) Oral every 6 hours PRN Temp greater or equal to 38C (100.4F), Mild Pain (1 - 3)  acetaminophen  Suppository .. 650 milliGRAM(s) Rectal every 8 hours PRN Temp greater or equal to 38C (100.4F), Mild Pain (1 - 3)  oxycodone    5 mG/acetaminophen 325 mG 1 Tablet(s) Oral every 6 hours PRN Severe Pain (7 - 10)    Vital Signs Last 24 Hrs  T(F): 98.8 (31 Mar 2020 22:40), Max: 98.8 (31 Mar 2020 22:40)  HR: 89 (01 Apr 2020 07:11) (89 - 97)  BP: 97/62 (01 Apr 2020 07:11) (96/64 - 97/62)  RR: 18 (31 Mar 2020 22:40) (18 - 18)  SpO2: 96% (31 Mar 2020 22:40) (96% - 96%)  I&O's Summary    31 Mar 2020 07:01  -  01 Apr 2020 07:00  --------------------------------------------------------  IN: 0 mL / OUT: 240 mL / NET: -240 mL      PHYSICAL EXAM:  General: confused  ENT: MMM, no thrush  Neck: Supple, No JVD  Lungs:b/l diminished breath sounds  Cardio: RRR, S1/S2, No murmur  Abdomen: Soft, Nontender, Nondistended; Bowel sounds present  Extremities: No calf tenderness, No pitting edema    LABS:                        10.9   9.51  )-----------( 108      ( 01 Apr 2020 07:59 )             34.1     04-01    144  |  114  |  42  ----------------------------<  171  3.3   |  15  |  2.21    Ca    7.6      01 Apr 2020 07:59  Mg     2.3     03-31    TPro  6.1  /  Alb  1.5  /  TBili  0.4  /  DBili  x   /  AST  67  /  ALT  21  /  AlkPhos  171  04-01        eGFR if Non African American: 20 mL/min/1.73M2 (04-01-20 @ 07:59)  eGFR if : 23 mL/min/1.73M2 (04-01-20 @ 07:59)        Procalcitonin, Serum: 2.21 ng/mL (04-01-20 @ 07:59)                        03-29 [id number] 5.8        Culture - Blood (collected 30 Mar 2020 17:50)  Source: .Blood Blood-Peripheral  Preliminary Report (01 Apr 2020 02:02):    No growth to date.    Culture - Blood (collected 29 Mar 2020 07:25)  Source: .Blood Blood-Peripheral  Preliminary Report (30 Mar 2020 11:02):    No growth to date.    Culture - Urine (collected 27 Mar 2020 14:06)  Source: .Urine Clean Catch (Midstream)  Final Report (28 Mar 2020 19:37):    <10,000 CFU/mL Normal Urogenital May    Culture - Blood (collected 26 Mar 2020 21:10)  Source: .Blood Blood-Peripheral  Final Report (31 Mar 2020 10:01):    No Growth Final    Culture - Blood (collected 26 Mar 2020 21:10)  Source: .Blood Blood-Peripheral  Gram Stain (27 Mar 2020 22:43):    Growth in anaerobic bottle: Gram Negative Rods  Final Report (29 Mar 2020 13:49):    Growth in anaerobic bottle: Klebsiella pneumoniae    ***Blood Panel PCR results on this specimen are available    approximately 3 hours after the Gram stain result.***    Gram stain, PCR, and/or culture results may not always    correspond due to difference in methodologies.    ************************************************************    This PCR assay was performed using [employer].    The following targets are tested for: Enterococcus,    vancomycin resistant enterococci, Listeria monocytogenes,    coagulase negative staphylococci, S. aureus,    methicillin resistant S. aureus, Streptococcus agalactiae    (Group B), S. pneumoniae, S. pyogenes (Group A),    Acinetobacter baumannii, Enterobacter cloacae, E. coli,    Klebsiella oxytoca, K. pneumoniae, Proteus sp.,    Serratia marcescens, Haemophilus influenzae,    Neisseria meningitidis, Pseudomonas aeruginosa, Candida    albicans, C. glabrata, C krusei, C parapsilosis,    C. tropicalis and the KPC resistance gene.  Organism: Blood Culture PCR  Klebsiella pneumoniae (29 Mar 2020 13:49)  Organism: Klebsiella pneumoniae (29 Mar 2020 13:49)      -  Amikacin: S <=16      -  Ampicillin: R 16 These ampicillin results predict results for amoxicillin      -  Ampicillin/Sulbactam: S <=4/2 Enterobacter, Citrobacter, and Serratia may develop resistance during prolonged therapy (3-4 days)      -  Aztreonam: S <=4      -  Cefazolin: S <=2 Enterobacter, Citrobacter, and Serratia may develop resistance during prolonged therapy (3-4 days)      -  Cefepime: S <=2      -  Cefoxitin: S <=8      -  Ceftriaxone: S <=1 Enterobacter, Citrobacter, and Serratia may develop resistance during prolonged therapy      -  Ciprofloxacin: S <=1      -  Ertapenem: S <=0.5      -  Gentamicin: S <=2      -  Imipenem: S <=1      -  Levofloxacin: S <=2      -  Meropenem: S <=1      -  Piperacillin/Tazobactam: S <=8      -  Tobramycin: S <=2      -  Trimethoprim/Sulfamethoxazole: S <=2/38      Method Type: ROSAMARIA  Organism: Blood Culture PCR (29 Mar 2020 13:49)      -  Klebsiella pneumoniae: Detec      Method Type: PCR      RADIOLOGY & ADDITIONAL TESTS:    Care Discussed with Consultants/Other Providers:

## 2020-04-01 NOTE — PROGRESS NOTE ADULT - ASSESSMENT
84 years old female with history of CKD, Anemia of chronic disease, Arthritis, GOUT, CAD/HLD, right Lung Mass, sacral decub, presents with weakness, decreased po intake, hypotension, +acute renal failure, hypernatremia, ATN.    COVID Positive 3/26  Gram negative bacteremia, Klebsiella (possible pneumonia source)  Superimposed bacterial pneumonia suspected    onazithromycin/plaquenil day 5/5  3/29/20 blood cultures negative   ID following  Continue with Ceftriaxone 1 g IV q24 for the treatment of Klebsiella bacteremia , day 5/7    #Hypotension   -keep MAP > 65, s/p multiple liter boluses   -midodrine for now, taper when/as tolerated     #SHAWN on CKD stage 3, baseline cr 1.28 as of 10/2019  Hypokalemia  Hypernatremia-- improving  Suspect hypotensive ATN  slowly improving  Renal following  Replete K+ for hypokalemia today    #Low folate   cont folic acid    #CAD (coronary artery disease)  With history of cardiac stents, continue medical management with ASA, Plavix. Add statin.     #Type 2 diabetes mellitus  by history, A1C in pre-diabetic range, will not check fingersticks to minimize exposure risk in this patient   03-29 [id number] 5.8    #HLD (hyperlipidemia)  statin     # Rheumatoid arthritis  Supportive care, analgesia PRN.     # DVT prophylaxis  Heparin sq.    #DNR/DNI , palliative following    Son Andrea updated. 84 years old female with history of CKD, Anemia of chronic disease, Arthritis, GOUT, CAD/HLD, right Lung Mass, sacral decub, presents with weakness, decreased po intake, hypotension, +acute renal failure, hypernatremia, ATN.    COVID Positive 3/26  Gram negative bacteremia, Klebsiella (possible pneumonia source)  Superimposed bacterial pneumonia suspected    onazithromycin/plaquenil day 5/5  3/29/20 blood cultures negative   ID following  Continue with Ceftriaxone 1 g IV q24 for the treatment of Klebsiella bacteremia , day 5/7    #Hypotension   -keep MAP > 65, s/p multiple liter boluses   -midodrine for now, taper when/as tolerated     #SHAWN on CKD stage 3, baseline cr 1.28 as of 10/2019  Hypokalemia  Hypernatremia-- improving  Suspect hypotensive ATN  slowly improving  Renal following    #Low folate   cont folic acid    #CAD (coronary artery disease)  With history of cardiac stents, continue medical management with ASA, Plavix. Add statin.     #Type 2 diabetes mellitus  by history, A1C in pre-diabetic range, will not check fingersticks to minimize exposure risk in this patient   03-29 [id number] 5.8    #HLD (hyperlipidemia)  statin     # Rheumatoid arthritis  Supportive care, analgesia PRN.     # DVT prophylaxis  Heparin sq.    #DNR/DNI , palliative following    Son Andrea updated.

## 2020-04-02 LAB
CULTURE RESULTS: SIGNIFICANT CHANGE UP
SPECIMEN SOURCE: SIGNIFICANT CHANGE UP

## 2020-04-02 PROCEDURE — 99232 SBSQ HOSP IP/OBS MODERATE 35: CPT

## 2020-04-02 RX ORDER — DEXTROSE MONOHYDRATE, SODIUM CHLORIDE, AND POTASSIUM CHLORIDE 50; .745; 4.5 G/1000ML; G/1000ML; G/1000ML
1000 INJECTION, SOLUTION INTRAVENOUS
Refills: 0 | Status: DISCONTINUED | OUTPATIENT
Start: 2020-04-02 | End: 2020-04-02

## 2020-04-02 RX ORDER — SODIUM CHLORIDE 9 MG/ML
1000 INJECTION, SOLUTION INTRAVENOUS
Refills: 0 | Status: DISCONTINUED | OUTPATIENT
Start: 2020-04-02 | End: 2020-04-03

## 2020-04-02 RX ADMIN — Medication 650 MILLIGRAM(S): at 06:32

## 2020-04-02 RX ADMIN — SODIUM CHLORIDE 50 MILLILITER(S): 9 INJECTION, SOLUTION INTRAVENOUS at 17:30

## 2020-04-02 RX ADMIN — MIDODRINE HYDROCHLORIDE 10 MILLIGRAM(S): 2.5 TABLET ORAL at 06:32

## 2020-04-02 RX ADMIN — HEPARIN SODIUM 5000 UNIT(S): 5000 INJECTION INTRAVENOUS; SUBCUTANEOUS at 17:29

## 2020-04-02 RX ADMIN — HEPARIN SODIUM 5000 UNIT(S): 5000 INJECTION INTRAVENOUS; SUBCUTANEOUS at 06:32

## 2020-04-02 RX ADMIN — CEFTRIAXONE 100 MILLIGRAM(S): 500 INJECTION, POWDER, FOR SOLUTION INTRAMUSCULAR; INTRAVENOUS at 11:14

## 2020-04-02 NOTE — PROGRESS NOTE ADULT - SUBJECTIVE AND OBJECTIVE BOX
Resting, confused    Vital Signs Last 24 Hrs  T(C): 37.3 (04-01-20 @ 21:11), Max: 37.3 (04-01-20 @ 21:11)  T(F): 99.2 (04-01-20 @ 21:11), Max: 99.2 (04-01-20 @ 21:11)  HR: 92 (04-02-20 @ 06:32) (87 - 92)  BP: 99/46 (04-02-20 @ 06:32) (99/46 - 102/68)  RR: 16 (04-02-20 @ 06:32) (16 - 18)  SpO2: 93% (04-02-20 @ 06:32) (93% - 95%)    PE:  In accordance with current standards limiting direct patient contact pls refer to most recent hospitalist exam  Visually no edema                                       10.9   9.51  )-----------( 108      ( 01 Apr 2020 07:59 )             34.1     01 Apr 2020 07:59    144    |  114    |  42     ----------------------------<  171    3.3     |  15     |  2.21     Ca    7.6        01 Apr 2020 07:59    TPro  6.1    /  Alb  1.5    /  TBili  0.4    /  DBili  x      /  AST  67     /  ALT  21     /  AlkPhos  171    01 Apr 2020 07:59    LIVER FUNCTIONS - ( 01 Apr 2020 07:59 )  Alb: 1.5 g/dL / Pro: 6.1 g/dL / ALK PHOS: 171 U/L / ALT: 21 U/L / AST: 67 U/L / GGT: x           acetaminophen   Tablet .. 650 milliGRAM(s) Oral every 6 hours PRN  acetaminophen  Suppository .. 650 milliGRAM(s) Rectal every 8 hours PRN  aspirin  chewable 81 milliGRAM(s) Oral daily  atorvastatin 40 milliGRAM(s) Oral at bedtime  cefTRIAXone   IVPB 1000 milliGRAM(s) IV Intermittent every 24 hours  clopidogrel Tablet 75 milliGRAM(s) Oral daily  dextrose 5%. 1000 milliLiter(s) IV Continuous <Continuous>  folic acid 1 milliGRAM(s) Oral daily  heparin  Injectable 5000 Unit(s) SubCutaneous every 12 hours  hydroxychloroquine 200 milliGRAM(s) Oral every 12 hours  hydroxychloroquine   Oral   midodrine. 10 milliGRAM(s) Oral every 8 hours  multivitamin 1 Tablet(s) Oral daily  oxycodone    5 mG/acetaminophen 325 mG 1 Tablet(s) Oral every 6 hours PRN  sodium bicarbonate 650 milliGRAM(s) Oral four times a day  thiamine 100 milliGRAM(s) Oral daily  zinc sulfate 220 milliGRAM(s) Oral daily    A/P:    R lung mass, PNA, + COVID, s/p Kleb bacteremia  Adm w/severe dehydration, FTT, pre-renal SHAWN on CKD 3 (Cr 1.28 - 10/16/19)  Renal fx, Na has been improving on hypotonic IVF  Borderline BP, Midodrine 10mg TID  Goal SBP > 90  Await today's blood work  F/u BMP, Mg   Na Bicarb PO QID  Abx per ID  No nephrotoxins  Overall prognosis is guarded/poor  Palliative eval most appropriate  DNR/I

## 2020-04-02 NOTE — PROGRESS NOTE ADULT - SUBJECTIVE AND OBJECTIVE BOX
Patient is a 84y old  Female who presents with a chief complaint of weakness, lethargy, decreased oral intake (01 Apr 2020 12:46)      Patient seen and examined at bedside.    ALLERGIES:  Berries (Angioedema (Mild to Mod))  sulfa drugs (Rash)    MEDICATIONS  (STANDING):  aspirin  chewable 81 milliGRAM(s) Oral daily  atorvastatin 40 milliGRAM(s) Oral at bedtime  cefTRIAXone   IVPB 1000 milliGRAM(s) IV Intermittent every 24 hours  clopidogrel Tablet 75 milliGRAM(s) Oral daily  dextrose 5% 1000 milliLiter(s) (50 mL/Hr) IV Continuous <Continuous>  epoetin chayito Injectable 22515 Unit(s) SubCutaneous every 7 days  folic acid 1 milliGRAM(s) Oral daily  heparin  Injectable 5000 Unit(s) SubCutaneous every 12 hours  hydroxychloroquine 200 milliGRAM(s) Oral every 12 hours  hydroxychloroquine   Oral   midodrine. 10 milliGRAM(s) Oral every 8 hours  multivitamin 1 Tablet(s) Oral daily  potassium chloride    Tablet ER 20 milliEquivalent(s) Oral once  sodium bicarbonate 650 milliGRAM(s) Oral four times a day  thiamine 100 milliGRAM(s) Oral daily  zinc sulfate 220 milliGRAM(s) Oral daily    MEDICATIONS  (PRN):  acetaminophen   Tablet .. 650 milliGRAM(s) Oral every 6 hours PRN Temp greater or equal to 38C (100.4F), Mild Pain (1 - 3)  acetaminophen  Suppository .. 650 milliGRAM(s) Rectal every 8 hours PRN Temp greater or equal to 38C (100.4F), Mild Pain (1 - 3)  oxycodone    5 mG/acetaminophen 325 mG 1 Tablet(s) Oral every 6 hours PRN Severe Pain (7 - 10)    Vital Signs Last 24 Hrs  T(F): 99.2 (01 Apr 2020 21:11), Max: 99.2 (01 Apr 2020 21:11)  HR: 92 (02 Apr 2020 06:32) (87 - 92)  BP: 99/46 (02 Apr 2020 06:32) (99/46 - 102/68)  RR: 16 (02 Apr 2020 06:32) (16 - 18)  SpO2: 93% (02 Apr 2020 06:32) (93% - 95%)  I&O's Summary    01 Apr 2020 07:01  -  02 Apr 2020 07:00  --------------------------------------------------------  IN: 0 mL / OUT: 1 mL / NET: -1 mL      PHYSICAL EXAM:  General: confused  ENT: MMM, no thrush  Neck: Supple, No JVD  Lungs:b/l diminished breath sounds  Cardio: RRR, S1/S2, No murmur  Abdomen: Soft, Nontender, Nondistended; Bowel sounds present  Extremities: No calf tenderness, No pitting edema    LABS:                        10.9   9.51  )-----------( 108      ( 01 Apr 2020 07:59 )             34.1     04-01    144  |  114  |  42  ----------------------------<  171  3.3   |  15  |  2.21    Ca    7.6      01 Apr 2020 07:59  Mg     2.3     03-31    TPro  6.1  /  Alb  1.5  /  TBili  0.4  /  DBili  x   /  AST  67  /  ALT  21  /  AlkPhos  171  04-01    eGFR if Non African American: 20 mL/min/1.73M2 (04-01-20 @ 07:59)  eGFR if : 23 mL/min/1.73M2 (04-01-20 @ 07:59)                            03-29 [id number] 5.8        Culture - Blood (collected 30 Mar 2020 17:50)  Source: .Blood Blood-Peripheral  Preliminary Report (01 Apr 2020 02:02):    No growth to date.    Culture - Blood (collected 29 Mar 2020 07:25)  Source: .Blood Blood-Peripheral  Preliminary Report (30 Mar 2020 11:02):    No growth to date.    Culture - Urine (collected 27 Mar 2020 14:06)  Source: .Urine Clean Catch (Midstream)  Final Report (28 Mar 2020 19:37):    <10,000 CFU/mL Normal Urogenital May    Culture - Blood (collected 26 Mar 2020 21:10)  Source: .Blood Blood-Peripheral  Final Report (31 Mar 2020 10:01):    No Growth Final    Culture - Blood (collected 26 Mar 2020 21:10)  Source: .Blood Blood-Peripheral  Gram Stain (27 Mar 2020 22:43):    Growth in anaerobic bottle: Gram Negative Rods  Final Report (29 Mar 2020 13:49):    Growth in anaerobic bottle: Klebsiella pneumoniae    ***Blood Panel PCR results on this specimen are available    approximately 3 hours after the Gram stain result.***    Gram stain, PCR, and/or culture results may not always    correspond due to difference in methodologies.    ************************************************************    This PCR assay was performed using [employer].    The following targets are tested for: Enterococcus,    vancomycin resistant enterococci, Listeria monocytogenes,    coagulase negative staphylococci, S. aureus,    methicillin resistant S. aureus, Streptococcus agalactiae    (Group B), S. pneumoniae, S. pyogenes (Group A),    Acinetobacter baumannii, Enterobacter cloacae, E. coli,    Klebsiella oxytoca, K. pneumoniae, Proteus sp.,    Serratia marcescens, Haemophilus influenzae,    Neisseria meningitidis, Pseudomonas aeruginosa, Candida    albicans, C. glabrata, C krusei, C parapsilosis,    C. tropicalis and the KPC resistance gene.  Organism: Blood Culture PCR  Klebsiella pneumoniae (29 Mar 2020 13:49)  Organism: Klebsiella pneumoniae (29 Mar 2020 13:49)      -  Amikacin: S <=16      -  Ampicillin: R 16 These ampicillin results predict results for amoxicillin      -  Ampicillin/Sulbactam: S <=4/2 Enterobacter, Citrobacter, and Serratia may develop resistance during prolonged therapy (3-4 days)      -  Aztreonam: S <=4      -  Cefazolin: S <=2 Enterobacter, Citrobacter, and Serratia may develop resistance during prolonged therapy (3-4 days)      -  Cefepime: S <=2      -  Cefoxitin: S <=8      -  Ceftriaxone: S <=1 Enterobacter, Citrobacter, and Serratia may develop resistance during prolonged therapy      -  Ciprofloxacin: S <=1      -  Ertapenem: S <=0.5      -  Gentamicin: S <=2      -  Imipenem: S <=1      -  Levofloxacin: S <=2      -  Meropenem: S <=1      -  Piperacillin/Tazobactam: S <=8      -  Tobramycin: S <=2      -  Trimethoprim/Sulfamethoxazole: S <=2/38      Method Type: ROSAMARIA  Organism: Blood Culture PCR (29 Mar 2020 13:49)      -  Klebsiella pneumoniae: Detec      Method Type: PCR        RADIOLOGY & ADDITIONAL TESTS:    Care Discussed with Consultants/Other Providers: Patient is a 84y old  Female who presents with a chief complaint of weakness, lethargy, decreased oral intake (01 Apr 2020 12:46)      Patient seen and examined at bedside. Remains confused and lethargic. Keeps on pulling off nrb mask.    ALLERGIES:  Berries (Angioedema (Mild to Mod))  sulfa drugs (Rash)    MEDICATIONS  (STANDING):  aspirin  chewable 81 milliGRAM(s) Oral daily  atorvastatin 40 milliGRAM(s) Oral at bedtime  cefTRIAXone   IVPB 1000 milliGRAM(s) IV Intermittent every 24 hours  clopidogrel Tablet 75 milliGRAM(s) Oral daily  dextrose 5% 1000 milliLiter(s) (50 mL/Hr) IV Continuous <Continuous>  epoetin chayito Injectable 57213 Unit(s) SubCutaneous every 7 days  folic acid 1 milliGRAM(s) Oral daily  heparin  Injectable 5000 Unit(s) SubCutaneous every 12 hours  hydroxychloroquine 200 milliGRAM(s) Oral every 12 hours  hydroxychloroquine   Oral   midodrine. 10 milliGRAM(s) Oral every 8 hours  multivitamin 1 Tablet(s) Oral daily  potassium chloride    Tablet ER 20 milliEquivalent(s) Oral once  sodium bicarbonate 650 milliGRAM(s) Oral four times a day  thiamine 100 milliGRAM(s) Oral daily  zinc sulfate 220 milliGRAM(s) Oral daily    MEDICATIONS  (PRN):  acetaminophen   Tablet .. 650 milliGRAM(s) Oral every 6 hours PRN Temp greater or equal to 38C (100.4F), Mild Pain (1 - 3)  acetaminophen  Suppository .. 650 milliGRAM(s) Rectal every 8 hours PRN Temp greater or equal to 38C (100.4F), Mild Pain (1 - 3)  oxycodone    5 mG/acetaminophen 325 mG 1 Tablet(s) Oral every 6 hours PRN Severe Pain (7 - 10)    Vital Signs Last 24 Hrs  T(F): 99.2 (01 Apr 2020 21:11), Max: 99.2 (01 Apr 2020 21:11)  HR: 92 (02 Apr 2020 06:32) (87 - 92)  BP: 99/46 (02 Apr 2020 06:32) (99/46 - 102/68)  RR: 16 (02 Apr 2020 06:32) (16 - 18)  SpO2: 93% (02 Apr 2020 06:32) (93% - 95%)  I&O's Summary    01 Apr 2020 07:01  -  02 Apr 2020 07:00  --------------------------------------------------------  IN: 0 mL / OUT: 1 mL / NET: -1 mL      PHYSICAL EXAM:  General: confused  ENT: MMM, no thrush  Neck: Supple, No JVD  Lungs:b/l diminished breath sounds, rales+  Cardio: RRR, S1/S2, No murmur  Abdomen: Soft, Nontender, Nondistended; Bowel sounds present  Extremities: No calf tenderness, No pitting edema    LABS:                        10.9   9.51  )-----------( 108      ( 01 Apr 2020 07:59 )             34.1     04-01    144  |  114  |  42  ----------------------------<  171  3.3   |  15  |  2.21    Ca    7.6      01 Apr 2020 07:59  Mg     2.3     03-31    TPro  6.1  /  Alb  1.5  /  TBili  0.4  /  DBili  x   /  AST  67  /  ALT  21  /  AlkPhos  171  04-01    eGFR if Non African American: 20 mL/min/1.73M2 (04-01-20 @ 07:59)  eGFR if : 23 mL/min/1.73M2 (04-01-20 @ 07:59)      03-29 DiapurjalzH1S 5.8        Culture - Blood (collected 30 Mar 2020 17:50)  Source: .Blood Blood-Peripheral  Preliminary Report (01 Apr 2020 02:02):    No growth to date.    Culture - Blood (collected 29 Mar 2020 07:25)  Source: .Blood Blood-Peripheral  Preliminary Report (30 Mar 2020 11:02):    No growth to date.    Culture - Urine (collected 27 Mar 2020 14:06)  Source: .Urine Clean Catch (Midstream)  Final Report (28 Mar 2020 19:37):    <10,000 CFU/mL Normal Urogenital May    Culture - Blood (collected 26 Mar 2020 21:10)  Source: .Blood Blood-Peripheral  Final Report (31 Mar 2020 10:01):    No Growth Final    Culture - Blood (collected 26 Mar 2020 21:10)  Source: .Blood Blood-Peripheral  Gram Stain (27 Mar 2020 22:43):    Growth in anaerobic bottle: Gram Negative Rods  Final Report (29 Mar 2020 13:49):    Growth in anaerobic bottle: Klebsiella pneumoniae    ***Blood Panel PCR results on this specimen are available    approximately 3 hours after the Gram stain result.***    Gram stain, PCR, and/or culture results may not always    correspond due to difference in methodologies.    ************************************************************    This PCR assay was performed using [employer].    The following targets are tested for: Enterococcus,    vancomycin resistant enterococci, Listeria monocytogenes,    coagulase negative staphylococci, S. aureus,    methicillin resistant S. aureus, Streptococcus agalactiae    (Group B), S. pneumoniae, S. pyogenes (Group A),    Acinetobacter baumannii, Enterobacter cloacae, E. coli,    Klebsiella oxytoca, K. pneumoniae, Proteus sp.,    Serratia marcescens, Haemophilus influenzae,    Neisseria meningitidis, Pseudomonas aeruginosa, Candida    albicans, C. glabrata, C krusei, C parapsilosis,    C. tropicalis and the KPC resistance gene.  Organism: Blood Culture PCR  Klebsiella pneumoniae (29 Mar 2020 13:49)  Organism: Klebsiella pneumoniae (29 Mar 2020 13:49)      -  Amikacin: S <=16      -  Ampicillin: R 16 These ampicillin results predict results for amoxicillin      -  Ampicillin/Sulbactam: S <=4/2 Enterobacter, Citrobacter, and Serratia may develop resistance during prolonged therapy (3-4 days)      -  Aztreonam: S <=4      -  Cefazolin: S <=2 Enterobacter, Citrobacter, and Serratia may develop resistance during prolonged therapy (3-4 days)      -  Cefepime: S <=2      -  Cefoxitin: S <=8      -  Ceftriaxone: S <=1 Enterobacter, Citrobacter, and Serratia may develop resistance during prolonged therapy      -  Ciprofloxacin: S <=1      -  Ertapenem: S <=0.5      -  Gentamicin: S <=2      -  Imipenem: S <=1      -  Levofloxacin: S <=2      -  Meropenem: S <=1      -  Piperacillin/Tazobactam: S <=8      -  Tobramycin: S <=2      -  Trimethoprim/Sulfamethoxazole: S <=2/38      Method Type: ROSAMARIA  Organism: Blood Culture PCR (29 Mar 2020 13:49)      -  Klebsiella pneumoniae: Detec      Method Type: PCR        RADIOLOGY & ADDITIONAL TESTS:    Care Discussed with Consultants/Other Providers:

## 2020-04-02 NOTE — PROGRESS NOTE ADULT - ASSESSMENT
84 years old female with history of CKD, Anemia of chronic disease, Arthritis, GOUT, CAD/HLD, right Lung Mass, sacral decub, presents with weakness, decreased po intake, hypotension, +acute renal failure, hypernatremia, ATN.    COVID Positive 3/26  Gram negative bacteremia, Klebsiella (possible pneumonia source)  Superimposed bacterial pneumonia suspected    onazithromycin/plaquenil completed  3/29/20 blood cultures negative   ID following  Continue with Ceftriaxone 1 g IV q24 for the treatment of Klebsiella bacteremia , day 6/7    #Hypotension   -keep MAP > 65, s/p multiple liter boluses   -midodrine for now, taper when/as tolerated     #SHAWN on CKD stage 3, baseline cr 1.28 as of 10/2019  Hypokalemia  Hypernatremia-- improving  Suspect hypotensive ATN  slowly improving  Renal following    #Low folate   cont folic acid    #CAD (coronary artery disease)  With history of cardiac stents, continue medical management with ASA, Plavix. Add statin.     #Type 2 diabetes mellitus  by history, A1C in pre-diabetic range, will not check fingersticks to minimize exposure risk in this patient   03-29 [id number] 5.8    #HLD (hyperlipidemia)  statin     # Rheumatoid arthritis  Supportive care, analgesia PRN.     # DVT prophylaxis  Heparin sq.    #DNR/DNI , palliative following    Son Andrea updated. 84 years old female with history of CKD, Anemia of chronic disease, Arthritis, GOUT, CAD/HLD, right Lung Mass, sacral decub, presents with weakness, decreased po intake, hypotension, +acute renal failure, hypernatremia, ATN.    COVID Positive 3/26  Gram negative bacteremia, Klebsiella (possible pneumonia source)  Superimposed bacterial pneumonia Suspected    Pt satting 93% on NRB, afebrile overnight  s/p 5/5 azithromycin/plaquenil completed  3/29/20 blood cultures negative   ID following  Continue with Ceftriaxone 1 g IV q24 for the treatment of Klebsiella bacteremia , day 6/7  Repeat Covid screen tomorrow    #Hypotension : improving  -keep MAP > 65, s/p multiple liter boluses   -c/w midodrine for now    #SHAWN on CKD stage 3, baseline cr 1.28 as of 10/2019  Hypokalemia  Hypernatremia-- improving  Suspect hypotensive ATN  slowly improving  Renal following    #Low folate   cont folic acid    #CAD (coronary artery disease)  With history of cardiac stents, continue medical management with ASA, Plavix. Add statin.     #Type 2 diabetes mellitus  by history, A1C in pre-diabetic range, will not check fingersticks to minimize exposure risk in this patient   03-29 [id number] 5.8    #HLD (hyperlipidemia)  statin     # Rheumatoid arthritis  Supportive care, analgesia PRN.     # DVT prophylaxis  Heparin sq.    #DNR/DNI     John Mendez updated.

## 2020-04-03 LAB
ALBUMIN SERPL ELPH-MCNC: 0.9 G/DL — LOW (ref 3.3–5)
ALP SERPL-CCNC: 189 U/L — HIGH (ref 40–120)
ALT FLD-CCNC: 20 U/L — SIGNIFICANT CHANGE UP (ref 10–45)
ANION GAP SERPL CALC-SCNC: 19 MMOL/L — HIGH (ref 5–17)
AST SERPL-CCNC: 89 U/L — HIGH (ref 10–40)
BASOPHILS # BLD AUTO: 0.08 K/UL — SIGNIFICANT CHANGE UP (ref 0–0.2)
BASOPHILS NFR BLD AUTO: 0.7 % — SIGNIFICANT CHANGE UP (ref 0–2)
BILIRUB SERPL-MCNC: 0.4 MG/DL — SIGNIFICANT CHANGE UP (ref 0.2–1.2)
BUN SERPL-MCNC: 45 MG/DL — HIGH (ref 7–23)
CALCIUM SERPL-MCNC: 7.7 MG/DL — LOW (ref 8.4–10.5)
CHLORIDE SERPL-SCNC: 109 MMOL/L — HIGH (ref 96–108)
CO2 SERPL-SCNC: 10 MMOL/L — CRITICAL LOW (ref 22–31)
CREAT SERPL-MCNC: 2.72 MG/DL — HIGH (ref 0.5–1.3)
D DIMER BLD IA.RAPID-MCNC: >3560 NG/ML DDU — SIGNIFICANT CHANGE UP
EOSINOPHIL # BLD AUTO: 0.16 K/UL — SIGNIFICANT CHANGE UP (ref 0–0.5)
EOSINOPHIL NFR BLD AUTO: 1.3 % — SIGNIFICANT CHANGE UP (ref 0–6)
GLUCOSE SERPL-MCNC: 214 MG/DL — HIGH (ref 70–99)
HBA1C BLD-MCNC: 5.7 % — HIGH (ref 4–5.6)
HCT VFR BLD CALC: 34.4 % — LOW (ref 34.5–45)
HGB BLD-MCNC: 10.6 G/DL — LOW (ref 11.5–15.5)
IMM GRANULOCYTES NFR BLD AUTO: 1.2 % — SIGNIFICANT CHANGE UP (ref 0–1.5)
LYMPHOCYTES # BLD AUTO: 1.59 K/UL — SIGNIFICANT CHANGE UP (ref 1–3.3)
LYMPHOCYTES # BLD AUTO: 13.3 % — SIGNIFICANT CHANGE UP (ref 13–44)
MAGNESIUM SERPL-MCNC: 1.9 MG/DL — SIGNIFICANT CHANGE UP (ref 1.6–2.6)
MCHC RBC-ENTMCNC: 28.4 PG — SIGNIFICANT CHANGE UP (ref 27–34)
MCHC RBC-ENTMCNC: 30.8 GM/DL — LOW (ref 32–36)
MCV RBC AUTO: 92.2 FL — SIGNIFICANT CHANGE UP (ref 80–100)
MONOCYTES # BLD AUTO: 0.28 K/UL — SIGNIFICANT CHANGE UP (ref 0–0.9)
MONOCYTES NFR BLD AUTO: 2.3 % — SIGNIFICANT CHANGE UP (ref 2–14)
NEUTROPHILS # BLD AUTO: 9.72 K/UL — HIGH (ref 1.8–7.4)
NEUTROPHILS NFR BLD AUTO: 81.2 % — HIGH (ref 43–77)
NRBC # BLD: 2 /100 WBCS — HIGH (ref 0–0)
PLATELET # BLD AUTO: 60 K/UL — LOW (ref 150–400)
POTASSIUM SERPL-MCNC: 4.1 MMOL/L — SIGNIFICANT CHANGE UP (ref 3.5–5.3)
POTASSIUM SERPL-SCNC: 4.1 MMOL/L — SIGNIFICANT CHANGE UP (ref 3.5–5.3)
PROT SERPL-MCNC: 6 G/DL — SIGNIFICANT CHANGE UP (ref 6–8.3)
RBC # BLD: 3.73 M/UL — LOW (ref 3.8–5.2)
RBC # FLD: 19.8 % — HIGH (ref 10.3–14.5)
SARS-COV-2 RNA SPEC QL NAA+PROBE: DETECTED
SODIUM SERPL-SCNC: 138 MMOL/L — SIGNIFICANT CHANGE UP (ref 135–145)
WBC # BLD: 11.97 K/UL — HIGH (ref 3.8–10.5)
WBC # FLD AUTO: 11.97 K/UL — HIGH (ref 3.8–10.5)

## 2020-04-03 PROCEDURE — 99232 SBSQ HOSP IP/OBS MODERATE 35: CPT

## 2020-04-03 PROCEDURE — 99233 SBSQ HOSP IP/OBS HIGH 50: CPT

## 2020-04-03 RX ORDER — MORPHINE SULFATE 50 MG/1
2 CAPSULE, EXTENDED RELEASE ORAL
Qty: 50 | Refills: 0 | Status: DISCONTINUED | OUTPATIENT
Start: 2020-04-03 | End: 2020-04-03

## 2020-04-03 RX ORDER — SODIUM CHLORIDE 9 MG/ML
250 INJECTION INTRAMUSCULAR; INTRAVENOUS; SUBCUTANEOUS ONCE
Refills: 0 | Status: COMPLETED | OUTPATIENT
Start: 2020-04-03 | End: 2020-04-03

## 2020-04-03 RX ADMIN — SODIUM CHLORIDE 50 MILLILITER(S): 9 INJECTION, SOLUTION INTRAVENOUS at 01:08

## 2020-04-03 RX ADMIN — Medication 0.25 MILLIGRAM(S): at 00:54

## 2020-04-03 RX ADMIN — CEFTRIAXONE 100 MILLIGRAM(S): 500 INJECTION, POWDER, FOR SOLUTION INTRAMUSCULAR; INTRAVENOUS at 13:07

## 2020-04-03 RX ADMIN — HEPARIN SODIUM 5000 UNIT(S): 5000 INJECTION INTRAVENOUS; SUBCUTANEOUS at 05:58

## 2020-04-03 RX ADMIN — SODIUM CHLORIDE 500 MILLILITER(S): 9 INJECTION INTRAMUSCULAR; INTRAVENOUS; SUBCUTANEOUS at 00:54

## 2020-04-03 NOTE — PROGRESS NOTE ADULT - SUBJECTIVE AND OBJECTIVE BOX
Progress: lethargy and shortness of breath     Present Symptoms:   Dyspnea: yes  Nausea/Vomiting:   Anxiety:    Depressed Mood:   Fatigue:   Loss of appetite:   Pain:                   location:   Review of Systems: Unable to obtain due to poor mentation]    MEDICATIONS  (STANDING):  aspirin  chewable 81 milliGRAM(s) Oral daily  atorvastatin 40 milliGRAM(s) Oral at bedtime  cefTRIAXone   IVPB 1000 milliGRAM(s) IV Intermittent every 24 hours  clopidogrel Tablet 75 milliGRAM(s) Oral daily  folic acid 1 milliGRAM(s) Oral daily  heparin  Injectable 5000 Unit(s) SubCutaneous every 12 hours  hydroxychloroquine 200 milliGRAM(s) Oral every 12 hours  hydroxychloroquine   Oral   midodrine. 10 milliGRAM(s) Oral every 8 hours  morphine  Infusion 2 mG/Hr (2 mL/Hr) IV Continuous <Continuous>  multivitamin 1 Tablet(s) Oral daily  sodium bicarbonate 650 milliGRAM(s) Oral four times a day  thiamine 100 milliGRAM(s) Oral daily  zinc sulfate 220 milliGRAM(s) Oral daily    MEDICATIONS  (PRN):  acetaminophen   Tablet .. 650 milliGRAM(s) Oral every 6 hours PRN Temp greater or equal to 38C (100.4F), Mild Pain (1 - 3)  acetaminophen  Suppository .. 650 milliGRAM(s) Rectal every 8 hours PRN Temp greater or equal to 38C (100.4F), Mild Pain (1 - 3)  oxycodone    5 mG/acetaminophen 325 mG 1 Tablet(s) Oral every 6 hours PRN Severe Pain (7 - 10)      PHYSICAL EXAM:  Deferred at this time , pt examined earlier by MD   Vital Signs Last 24 Hrs  T(C): 36.1 (03 Apr 2020 09:28), Max: 36.1 (02 Apr 2020 23:09)  T(F): 97 (03 Apr 2020 09:28), Max: 97 (02 Apr 2020 23:09)  HR: 52 (03 Apr 2020 09:28) (52 - 89)  BP: 98/49 (03 Apr 2020 09:28) (79/50 - 102/52)  BP(mean): --  RR: 22 (03 Apr 2020 09:28) (20 - 22)  SpO2: 82% (03 Apr 2020 09:28) (77% - 90%)  General: weakened , lethargic, dyspneic          LABS:                          10.6   11.97 )-----------( 60       ( 03 Apr 2020 08:03 )             34.4     04-03    138  |  109<H>  |  45<H>  ----------------------------<  214<H>  4.1   |  10<LL>  |  2.72<H>    Ca    7.7<L>      03 Apr 2020 08:03  Mg     1.9     04-03    TPro  6.0  /  Alb  0.9<L>  /  TBili  0.4  /  DBili  x   /  AST  89<H>  /  ALT  20  /  AlkPhos  189<H>  04-03        RADIOLOGY & ADDITIONAL STUDIES:    ADVANCE DIRECTIVES: MOLST DNR/DNI   Advanced Care Planning discussion total time spent:

## 2020-04-03 NOTE — PROGRESS NOTE ADULT - ASSESSMENT
A/P : 84 years old female with history of CKD, Anemia of chronic disease, Arthritis, GOUT, CAD/HLD, right Lung Mass, sacral decub, presents with weakness, decreased po intake, hypotension, +acute renal failure, hypernatremia, ATN.    COVID Positive 3/26  Gram negative bacteremia, Klebsiella (possible pneumonia source)  Superimposed bacterial pneumonia Suspected    Pt satting 93% on NRB, afebrile overnight - now decompensating   s/p 5/5 azithromycin/plaquenil completed  3/29/20 blood cultures negative   ID following  Continue with Ceftriaxone 1 g IV q24 for the treatment of Klebsiella bacteremia , day 6/7  Repeat Covid screen tomorrow    #Hypotension : improving  -keep MAP > 65, s/p multiple liter boluses   -c/w midodrine for now    #SHAWN on CKD stage 3, baseline cr 1.28 as of 10/2019  Hypokalemia  Hypernatremia-- improving  Suspect hypotensive ATN  slowly improving  Renal following    **Palliative called to unit, pt now desaturating on non rebreather, reviewed w/ Dr Schuster, Pt dyspneic and restless.  Called and spoke to pts son Andrea, updated him on mothers current condition, and of her poor prognosis. We discussed using morphine to help with her   symptoms of sob , son aware and agreed. Son said he will update his sister.  Andrea has  my contact info for any questions/concerns. A/P : 84 years old female with history of CKD, Anemia of chronic disease, Arthritis, GOUT, CAD/HLD, right Lung Mass, sacral decub, presents with weakness, decreased po intake, hypotension, +acute renal failure, hypernatremia, ATN.    COVID Positive 3/26  Gram negative bacteremia, Klebsiella (possible pneumonia source)  Superimposed bacterial pneumonia Suspected    Pt satting 93% on NRB, afebrile overnight - now decompensating   s/p 5/5 azithromycin/plaquenil completed  3/29/20 blood cultures negative   ID following  Continue with Ceftriaxone 1 g IV q24 for the treatment of Klebsiella bacteremia , day 6/7  Repeat Covid screen tomorrow    #Hypotension : improving  -keep MAP > 65, s/p multiple liter boluses   -c/w midodrine for now    #SHAWN on CKD stage 3, baseline cr 1.28 as of 10/2019  Hypokalemia  Hypernatremia-- improving  Suspect hypotensive ATN  slowly improving  Renal following    **Palliative called to unit, pt now desaturating on non rebreather, reviewed w/ Dr Schuster, Pt dyspneic and restless.  Called and spoke to pts son Andrea, updated him on mothers current condition, and of her poor prognosis. We discussed using morphine to help with her   symptoms of sob , son aware and agreed. Son said he will update his sister.  Andrea has  my contact info for any questions/concerns. .

## 2020-04-03 NOTE — PROGRESS NOTE ADULT - SUBJECTIVE AND OBJECTIVE BOX
Confused, lethargic    Vital Signs Last 24 Hrs  T(C): 36.1 (04-03-20 @ 09:28), Max: 36.1 (04-02-20 @ 23:09)  T(F): 97 (04-03-20 @ 09:28), Max: 97 (04-02-20 @ 23:09)  HR: 52 (04-03-20 @ 09:28) (52 - 89)  BP: 98/49 (04-03-20 @ 09:28) (79/50 - 102/52)  RR: 22 (04-03-20 @ 09:28) (20 - 22)  SpO2: 82% (04-03-20 @ 09:28) (77% - 90%)    PE:  In accordance with current standards limiting direct patient contact pls refer to most recent hospitalist exam  Visually no edema                                               10.6   11.97 )-----------( 60       ( 03 Apr 2020 08:03 )             34.4     03 Apr 2020 08:03    138    |  109    |  45     ----------------------------<  214    4.1     |  10     |  2.72     Ca    7.7        03 Apr 2020 08:03  Mg     1.9       03 Apr 2020 08:03    TPro  6.0    /  Alb  0.9    /  TBili  0.4    /  DBili  x      /  AST  89     /  ALT  20     /  AlkPhos  189    03 Apr 2020 08:03    LIVER FUNCTIONS - ( 03 Apr 2020 08:03 )  Alb: 0.9 g/dL / Pro: 6.0 g/dL / ALK PHOS: 189 U/L / ALT: 20 U/L / AST: 89 U/L / GGT: x           acetaminophen   Tablet .. 650 milliGRAM(s) Oral every 6 hours PRN  acetaminophen  Suppository .. 650 milliGRAM(s) Rectal every 8 hours PRN  aspirin  chewable 81 milliGRAM(s) Oral daily  atorvastatin 40 milliGRAM(s) Oral at bedtime  cefTRIAXone   IVPB 1000 milliGRAM(s) IV Intermittent every 24 hours  clopidogrel Tablet 75 milliGRAM(s) Oral daily  dextrose 5%. 1000 milliLiter(s) IV Continuous <Continuous>  folic acid 1 milliGRAM(s) Oral daily  heparin  Injectable 5000 Unit(s) SubCutaneous every 12 hours  hydroxychloroquine 200 milliGRAM(s) Oral every 12 hours  hydroxychloroquine   Oral   midodrine. 10 milliGRAM(s) Oral every 8 hours  morphine  Infusion 2 mG/Hr IV Continuous <Continuous>  multivitamin 1 Tablet(s) Oral daily  oxycodone    5 mG/acetaminophen 325 mG 1 Tablet(s) Oral every 6 hours PRN  sodium bicarbonate 650 milliGRAM(s) Oral four times a day  thiamine 100 milliGRAM(s) Oral daily  zinc sulfate 220 milliGRAM(s) Oral daily    A/P:    R lung mass, PNA, + COVID, s/p Kleb bacteremia  Adm w/severe dehydration, FTT, SHAWN on CKD 3 (Cr 1.28 - 10/16/19)  Borderline/low BP on Midodrine 10mg TID  Overall prognosis is poor  Palliative following  Now DNR/I, comfort care  Would d/c further blood work   Will sign off    [phone]

## 2020-04-03 NOTE — PROGRESS NOTE ADULT - ATTENDING COMMENTS
Time spent 1 hour
I have personally seen and examined patient on the above date.  I discussed the case with CARINA Etienne and I agree with findings and plan as detailed per note above, which I have amended where appropriate.
I have personally seen and examined patient on the above date.  I discussed the case with Cherri Hyatt NP and I agree with findings and plan as detailed per note above, which I have amended where appropriate.
I have personally seen and examined patient on the above date.  I discussed the case with Cherri Hyatt NP and I agree with findings and plan as detailed per note above, which I have amended where appropriate.
I have personally seen and examined patient on the above date.  I discussed the case with BONNIE Payne and I agree with findings and plan as detailed per note above, which I have amended where appropriate.

## 2020-04-03 NOTE — PROVIDER CONTACT NOTE (OTHER) - RECOMMENDATIONS
I called Dr Hopper to clarify the order and he stated if the pt was not having any difficulty breathing then there was no need for the drip.

## 2020-04-03 NOTE — PROGRESS NOTE ADULT - ASSESSMENT
84 years old female with history of CKD, Anemia of chronic disease, Arthritis, GOUT, CAD/HLD, right Lung Mass, sacral decub, presents with weakness, decreased po intake, hypotension, +acute renal failure, hypernatremia, ATN.    COVID Positive 3/26  Gram negative bacteremia, Klebsiella (possible pneumonia source)  Superimposed bacterial pneumonia Suspected  Lethargic/confused, desatting to mid 80s on NRB  s/p 5/5 azithromycin/plaquenil completed  3/29/20 blood cultures negative   Continue with Ceftriaxone 1 g IV q24 for the treatment of Klebsiella bacteremia , day 7/7  Covid test done today  overall poor prognosis: cased /w dr. Moreno--- pt now comfort care    #Hypotension  -c/w midodrine    #SHAWN on CKD stage 3, baseline cr 1.28 as of 10/2019  Hypokalemia  Hypernatremia-- improving  Suspect hypotensive ATN  slowly improving  Renal following    #Low folate   cont folic acid    #CAD (coronary artery disease)  With history of cardiac stents, continue medical management with ASA, Plavix. Add statin.     #Type 2 diabetes mellitus  by history, A1C in pre-diabetic range, will not check fingersticks to minimize exposure risk in this patient   03-29 [id number] 5.8    #HLD (hyperlipidemia)  statin     # Rheumatoid arthritis  Supportive care, analgesia PRN.     # DVT prophylaxis  Heparin sq.    #DNR/DNI   Overall poor prognosis  COmfort measures only

## 2020-04-03 NOTE — PROGRESS NOTE ADULT - SUBJECTIVE AND OBJECTIVE BOX
Patient is a 84y old  Female who presents with a chief complaint of weakness, lethargy, decreased oral intake (03 Apr 2020 13:30)    Interval Hx  Patient seen and examined at bedside. Lethargic/confused, satting to mid 80s on NRB, pulls off NRB  mask frequently. Labored breathing.    ALLERGIES:  Berries (Angioedema (Mild to Mod))  sulfa drugs (Rash)    MEDICATIONS  (STANDING):  aspirin  chewable 81 milliGRAM(s) Oral daily  atorvastatin 40 milliGRAM(s) Oral at bedtime  cefTRIAXone   IVPB 1000 milliGRAM(s) IV Intermittent every 24 hours  clopidogrel Tablet 75 milliGRAM(s) Oral daily  folic acid 1 milliGRAM(s) Oral daily  heparin  Injectable 5000 Unit(s) SubCutaneous every 12 hours  hydroxychloroquine 200 milliGRAM(s) Oral every 12 hours  hydroxychloroquine   Oral   midodrine. 10 milliGRAM(s) Oral every 8 hours  morphine  Infusion 2 mG/Hr (2 mL/Hr) IV Continuous <Continuous>  multivitamin 1 Tablet(s) Oral daily  sodium bicarbonate 650 milliGRAM(s) Oral four times a day  thiamine 100 milliGRAM(s) Oral daily  zinc sulfate 220 milliGRAM(s) Oral daily    MEDICATIONS  (PRN):  acetaminophen   Tablet .. 650 milliGRAM(s) Oral every 6 hours PRN Temp greater or equal to 38C (100.4F), Mild Pain (1 - 3)  acetaminophen  Suppository .. 650 milliGRAM(s) Rectal every 8 hours PRN Temp greater or equal to 38C (100.4F), Mild Pain (1 - 3)  oxycodone    5 mG/acetaminophen 325 mG 1 Tablet(s) Oral every 6 hours PRN Severe Pain (7 - 10)    Vital Signs Last 24 Hrs  T(F): 97 (03 Apr 2020 09:28), Max: 97 (02 Apr 2020 23:09)  HR: 52 (03 Apr 2020 09:28) (52 - 89)  BP: 98/49 (03 Apr 2020 09:28) (79/50 - 102/52)  RR: 22 (03 Apr 2020 09:28) (20 - 22)  SpO2: 82% (03 Apr 2020 09:28) (77% - 90%)  I&O's Summary    02 Apr 2020 07:01  -  03 Apr 2020 07:00  --------------------------------------------------------  IN: 750 mL / OUT: 0 mL / NET: 750 mL      PHYSICAL EXAM:  General: Lethargic, in severe respiratory distress  Neck: Supple, No JVD  Lungs: B/L scattered rales, labored breathing  Cardio: RRR, S1/S2, No murmur  Abdomen: Soft, Nontender, Nondistended; Bowel sounds present  Extremities: No calf tenderness, No pitting edema    LABS:                        10.6   11.97 )-----------( 60       ( 03 Apr 2020 08:03 )             34.4     04-03    138  |  109  |  45  ----------------------------<  214  4.1   |  10  |  2.72    Ca    7.7      03 Apr 2020 08:03  Mg     1.9     04-03    TPro  6.0  /  Alb  0.9  /  TBili  0.4  /  DBili  x   /  AST  89  /  ALT  20  /  AlkPhos  189  04-03        eGFR if Non African American: 16 mL/min/1.73M2 (04-03-20 @ 08:03)  eGFR if : 18 mL/min/1.73M2 (04-03-20 @ 08:03)        Procalcitonin, Serum: 2.21 ng/mL (04-01-20 @ 07:59)      03-29 [id number] 5.8        Culture - Blood (collected 30 Mar 2020 17:50)  Source: .Blood Blood-Peripheral  Preliminary Report (01 Apr 2020 02:02):    No growth to date.    Culture - Blood (collected 29 Mar 2020 07:25)  Source: .Blood Blood-Peripheral  Final Report (02 Apr 2020 11:00):    No Growth Final      RADIOLOGY & ADDITIONAL TESTS:    Care Discussed with Consultants/Other Providers:

## 2020-04-04 VITALS
SYSTOLIC BLOOD PRESSURE: 61 MMHG | TEMPERATURE: 97 F | OXYGEN SATURATION: 73 % | RESPIRATION RATE: 18 BRPM | HEART RATE: 99 BPM | DIASTOLIC BLOOD PRESSURE: 37 MMHG

## 2020-04-04 LAB
ALBUMIN SERPL ELPH-MCNC: 1.4 G/DL — LOW (ref 3.3–5)
ALP SERPL-CCNC: 203 U/L — HIGH (ref 40–120)
ALT FLD-CCNC: 24 U/L — SIGNIFICANT CHANGE UP (ref 10–45)
ANION GAP SERPL CALC-SCNC: 16 MMOL/L — SIGNIFICANT CHANGE UP (ref 5–17)
AST SERPL-CCNC: 123 U/L — HIGH (ref 10–40)
BASOPHILS # BLD AUTO: 0.04 K/UL — SIGNIFICANT CHANGE UP (ref 0–0.2)
BASOPHILS NFR BLD AUTO: 0.6 % — SIGNIFICANT CHANGE UP (ref 0–2)
BILIRUB SERPL-MCNC: 0.4 MG/DL — SIGNIFICANT CHANGE UP (ref 0.2–1.2)
BUN SERPL-MCNC: 47 MG/DL — HIGH (ref 7–23)
CALCIUM SERPL-MCNC: 7.6 MG/DL — LOW (ref 8.4–10.5)
CHLORIDE SERPL-SCNC: 108 MMOL/L — SIGNIFICANT CHANGE UP (ref 96–108)
CO2 SERPL-SCNC: 14 MMOL/L — LOW (ref 22–31)
CREAT SERPL-MCNC: 3.39 MG/DL — HIGH (ref 0.5–1.3)
CRP SERPL-MCNC: 17.89 MG/DL — HIGH (ref 0–0.4)
CULTURE RESULTS: SIGNIFICANT CHANGE UP
EOSINOPHIL # BLD AUTO: 0.01 K/UL — SIGNIFICANT CHANGE UP (ref 0–0.5)
EOSINOPHIL NFR BLD AUTO: 0.2 % — SIGNIFICANT CHANGE UP (ref 0–6)
FERRITIN SERPL-MCNC: 5656 NG/ML — HIGH (ref 15–150)
GLUCOSE SERPL-MCNC: 74 MG/DL — SIGNIFICANT CHANGE UP (ref 70–99)
HCT VFR BLD CALC: 32.7 % — LOW (ref 34.5–45)
HGB BLD-MCNC: 10.1 G/DL — LOW (ref 11.5–15.5)
IMM GRANULOCYTES NFR BLD AUTO: 2 % — HIGH (ref 0–1.5)
LDH SERPL L TO P-CCNC: 1314 U/L — HIGH (ref 50–242)
LYMPHOCYTES # BLD AUTO: 1.65 K/UL — SIGNIFICANT CHANGE UP (ref 1–3.3)
LYMPHOCYTES # BLD AUTO: 25.9 % — SIGNIFICANT CHANGE UP (ref 13–44)
MCHC RBC-ENTMCNC: 28.9 PG — SIGNIFICANT CHANGE UP (ref 27–34)
MCHC RBC-ENTMCNC: 30.9 GM/DL — LOW (ref 32–36)
MCV RBC AUTO: 93.4 FL — SIGNIFICANT CHANGE UP (ref 80–100)
MONOCYTES # BLD AUTO: 0.08 K/UL — SIGNIFICANT CHANGE UP (ref 0–0.9)
MONOCYTES NFR BLD AUTO: 1.3 % — LOW (ref 2–14)
NEUTROPHILS # BLD AUTO: 4.46 K/UL — SIGNIFICANT CHANGE UP (ref 1.8–7.4)
NEUTROPHILS NFR BLD AUTO: 70 % — SIGNIFICANT CHANGE UP (ref 43–77)
NRBC # BLD: 5 /100 WBCS — HIGH (ref 0–0)
PLATELET # BLD AUTO: 36 K/UL — LOW (ref 150–400)
POTASSIUM SERPL-MCNC: 3.7 MMOL/L — SIGNIFICANT CHANGE UP (ref 3.5–5.3)
POTASSIUM SERPL-SCNC: 3.7 MMOL/L — SIGNIFICANT CHANGE UP (ref 3.5–5.3)
PROCALCITONIN SERPL-MCNC: 2.5 NG/ML — HIGH
PROT SERPL-MCNC: 5.7 G/DL — LOW (ref 6–8.3)
RBC # BLD: 3.5 M/UL — LOW (ref 3.8–5.2)
RBC # FLD: 19.9 % — HIGH (ref 10.3–14.5)
SODIUM SERPL-SCNC: 138 MMOL/L — SIGNIFICANT CHANGE UP (ref 135–145)
SPECIMEN SOURCE: SIGNIFICANT CHANGE UP
WBC # BLD: 6.37 K/UL — SIGNIFICANT CHANGE UP (ref 3.8–10.5)
WBC # FLD AUTO: 6.37 K/UL — SIGNIFICANT CHANGE UP (ref 3.8–10.5)

## 2020-04-04 PROCEDURE — 99232 SBSQ HOSP IP/OBS MODERATE 35: CPT

## 2020-04-04 PROCEDURE — 99233 SBSQ HOSP IP/OBS HIGH 50: CPT

## 2020-04-04 RX ORDER — MORPHINE SULFATE 50 MG/1
4 CAPSULE, EXTENDED RELEASE ORAL EVERY 4 HOURS
Refills: 0 | Status: DISCONTINUED | OUTPATIENT
Start: 2020-04-04 | End: 2020-04-05

## 2020-04-04 RX ADMIN — MORPHINE SULFATE 4 MILLIGRAM(S): 50 CAPSULE, EXTENDED RELEASE ORAL at 13:33

## 2020-04-04 RX ADMIN — MORPHINE SULFATE 4 MILLIGRAM(S): 50 CAPSULE, EXTENDED RELEASE ORAL at 18:17

## 2020-04-04 NOTE — PROGRESS NOTE ADULT - SUBJECTIVE AND OBJECTIVE BOX
Follow-up Pall Progress Note    Ariel Moreno MD  [phone]    Agonal respirations    Medications:  Vital Signs Last 24 Hrs  T(C): 36.1 (04 Apr 2020 09:55), Max: 36.1 (04 Apr 2020 09:55)  T(F): 97 (04 Apr 2020 09:55), Max: 97 (04 Apr 2020 09:55)  HR: 99 (04 Apr 2020 09:55) (50 - 99)  BP: 61/37 (04 Apr 2020 09:55) (61/37 - 100/42)  BP(mean): --  RR: 18 (04 Apr 2020 09:55) (18 - 20)  SpO2: 73% (04 Apr 2020 09:55) (73% - 80%)            LABS:                        10.1   6.37  )-----------( 36       ( 04 Apr 2020 09:00 )             32.7     04-04    138  |  108  |  47<H>  ----------------------------<  74  3.7   |  14<L>  |  3.39<H>    Ca    7.6<L>      04 Apr 2020 09:00  Mg     1.9     04-03    TPro  5.7<L>  /  Alb  1.4<L>  /  TBili  0.4  /  DBili  x   /  AST  123<H>  /  ALT  24  /  AlkPhos  203<H>  04-04        Procalcitonin, Serum: 2.50 ng/mL (04-04-20 @ 09:00)        CULTURES:  Culture Results:   No Growth Final (03-30 @ 17:50)  Culture Results:   No Growth Final (03-29 @ 07:25)    Most recent blood culture -- 03-30 @ 17:50   -- -- .Blood Blood-Peripheral 03-30 @ 17:50      Physical Examination:      RADIOLOGY REVIEWED  CXR:    CT chest:    TTE:

## 2020-04-04 NOTE — PROGRESS NOTE ADULT - ASSESSMENT
84 years old female with history of CKD, Anemia of chronic disease, Arthritis, GOUT, CAD/HLD, right Lung Mass, sacral decub, presents with weakness, decreased po intake, hypotension, +acute renal failure, hypernatremia, ATN.    COVID Positive 3/26  Gram negative bacteremia, Klebsiella (possible pneumonia source)  Superimposed bacterial pneumonia Suspected  Lethargic/confused, desatting to mid 80s on NRB  s/p 5/5 azithromycin/plaquenil completed  3/29/20 blood cultures negative   Continue with Ceftriaxone 1 g IV q24 for the treatment of Klebsiella bacteremia , day 7/7  Covid test done today  overall poor prognosis: cased /w dr. Moreno--- pt now comfort care    #Hypotension  -c/w midodrine    #SHAWN on CKD stage 3, baseline cr 1.28 as of 10/2019  Hypokalemia  Hypernatremia-- improving  Suspect hypotensive ATN  slowly improving  Renal following    #Low folate   cont folic acid    #CAD (coronary artery disease)  With history of cardiac stents, continue medical management with ASA, Plavix. Add statin.     #Type 2 diabetes mellitus  by history, A1C in pre-diabetic range, will not check fingersticks to minimize exposure risk in this patient   03-29 OmewahxkxvT3H 5.8    #HLD (hyperlipidemia)  statin     # Rheumatoid arthritis  Supportive care, analgesia PRN.     # DVT prophylaxis  Heparin sq.    #DNR/DNI   Overall poor prognosis  COmfort measures only  Agree with ms.

## 2020-04-04 NOTE — PROGRESS NOTE ADULT - REASON FOR ADMISSION
weakness, lethargy, decreased oral intake

## 2020-04-04 NOTE — PROGRESS NOTE ADULT - ASSESSMENT
84 years old female with history of CKD, Anemia of chronic disease, Arthritis, GOUT, CAD/HLD, right Lung Mass, sacral decub, presents with weakness, decreased po intake, hypotension, +acute renal failure, hypernatremia, ATN.    Pt on Comfort Measures   on iv morphine pushes prn, will start on iv morphine drip as needed.      COVID Positive 3/26  Gram negative bacteremia, Klebsiella (possible pneumonia source)  Remains Lethargic, desatting to mid 80s on NRB  s/p 5/5 azithromycin/plaquenil   s/p 7 days of Ceftriaxone 1 g IV q24 for the treatment of Klebsiella bacteremia   Repeat Covid test yesterday positive  overall poor prognosis: start on IV morphine pushes prn for comfort measures.    #Hypotension  #SHAWN on CKD stage 3, baseline cr 1.28 as of 10/2019  #Hypokalemia  #Hypernatremia-- improving  #CAD (coronary artery disease)  #Type 2 diabetes mellitus: stable  #HLD (hyperlipidemia)  # Rheumatoid arthritis    #DNR/DNI   Overall poor prognosis  Comfort measures only, No labs to be drawn  D/w John Mendez. 84 years old female with history of CKD, Anemia of chronic disease, Arthritis, GOUT, CAD/HLD, right Lung Mass, sacral decub, presents with weakness, decreased po intake, hypotension, +acute renal failure, hypernatremia, ATN.    Pt on Comfort Measures   on iv morphine pushes prn, will start on iv morphine drip as needed.      COVID Positive 3/26  Gram negative bacteremia, Klebsiella (possible pneumonia source)  Remains Lethargic, desatting to mid 80s on NRB  s/p 5/5 azithromycin/plaquenil   s/p 7 days of Ceftriaxone 1 g IV q24 for the treatment of Klebsiella bacteremia   Repeat Covid test yesterday positive  overall poor prognosis: start on IV morphine pushes prn for comfort measures.    #Hypotension  #SHAWN on CKD stage 3, baseline cr 1.28 as of 10/2019  #Hypokalemia  #Hypernatremia-- improving  #CAD (coronary artery disease)  #Type 2 diabetes mellitus: stable  #HLD (hyperlipidemia)  # Rheumatoid arthritis    #DNR/DNI   Overall poor prognosis  Comfort measures only, No labs to be drawn  Attempted calling son, no answer-- will reattempt

## 2020-04-04 NOTE — PROGRESS NOTE ADULT - SUBJECTIVE AND OBJECTIVE BOX
Patient is a 84y old  Female who presents with a chief complaint of weakness, lethargy, decreased oral intake (04 Apr 2020 12:14)    Interval Hx  Patient seen and examined at bedside. Pt continues to remain very lethargic/confused, satting in mid 80s with NRB, with  labored breathing.      ALLERGIES:  Berries (Angioedema (Mild to Mod))  sulfa drugs (Rash)    MEDICATIONS  (STANDING):  aspirin  chewable 81 milliGRAM(s) Oral daily  atorvastatin 40 milliGRAM(s) Oral at bedtime  clopidogrel Tablet 75 milliGRAM(s) Oral daily  folic acid 1 milliGRAM(s) Oral daily  heparin  Injectable 5000 Unit(s) SubCutaneous every 12 hours  midodrine. 10 milliGRAM(s) Oral every 8 hours  morphine  - Injectable 4 milliGRAM(s) IV Push every 4 hours  multivitamin 1 Tablet(s) Oral daily  sodium bicarbonate 650 milliGRAM(s) Oral four times a day  thiamine 100 milliGRAM(s) Oral daily  zinc sulfate 220 milliGRAM(s) Oral daily    MEDICATIONS  (PRN):  acetaminophen   Tablet .. 650 milliGRAM(s) Oral every 6 hours PRN Temp greater or equal to 38C (100.4F), Mild Pain (1 - 3)  acetaminophen  Suppository .. 650 milliGRAM(s) Rectal every 8 hours PRN Temp greater or equal to 38C (100.4F), Mild Pain (1 - 3)    Vital Signs Last 24 Hrs  T(F): 97 (04 Apr 2020 09:55), Max: 97 (04 Apr 2020 09:55)  HR: 99 (04 Apr 2020 09:55) (50 - 99)  BP: 61/37 (04 Apr 2020 09:55) (61/37 - 100/42)  RR: 18 (04 Apr 2020 09:55) (18 - 20)  SpO2: 73% (04 Apr 2020 09:55) (73% - 80%)  I&O's Summary    PHYSICAL EXAM:  General: Lethargic  ENT: MMM, no thrush  Neck: Supple, No JVD  Lungs: B/L scattered rales, decreased breath sound  Cardio: RRR, S1/S2, No murmur  Abdomen: Soft, Nontender, Nondistended; Bowel sounds present  Extremities: No calf tenderness, No pitting edema      LABS:                        10.1   6.37  )-----------( 36       ( 04 Apr 2020 09:00 )             32.7     04-04    138  |  108  |  47  ----------------------------<  74  3.7   |  14  |  3.39    Ca    7.6      04 Apr 2020 09:00  Mg     1.9     04-03    TPro  5.7  /  Alb  1.4  /  TBili  0.4  /  DBili  x   /  AST  123  /  ALT  24  /  AlkPhos  203  04-04        eGFR if Non African American: 12 mL/min/1.73M2 (04-04-20 @ 09:00)  eGFR if : 14 mL/min/1.73M2 (04-04-20 @ 09:00)        Procalcitonin, Serum: 2.50 ng/mL (04-04-20 @ 09:00)                        04-03 LiygbabltcL2C 5.7  03-29 KbdvbpoltbY2W 5.8        Culture - Blood (collected 30 Mar 2020 17:50)  Source: .Blood Blood-Peripheral  Final Report (04 Apr 2020 02:00):    No Growth Final    Culture - Blood (collected 29 Mar 2020 07:25)  Source: .Blood Blood-Peripheral  Final Report (02 Apr 2020 11:00):    No Growth Final      RADIOLOGY & ADDITIONAL TESTS:    Care Discussed with Consultants/Other Providers:

## 2020-04-04 NOTE — CHART NOTE - NSCHARTNOTEFT_GEN_A_CORE
Assessment:   Patient seen for: follow up    Source: [x] medical review, [ ] patient    Factors impacting intake: [ ] none [ ] nausea  [ ] vomiting [ ] diarrhea [ ] constipation  [ ]chewing problems [ ] swallowing issues  [ ] other:     Intake:     Diet Prescription: Diet, Dysphagia 1 Pureed-Thin Liquids:   Supplement Feeding Modality:  Oral  Nepro Cans or Servings Per Day:  2       Frequency:  Two Times a day (03-30-20 @ 09:56)    Current Weight: 81.2 Kg (179 Lbs) 03/29 03/27: 80.3 Kg (177 Lbs)    % Weight Change:  inc 1.1% (2 Lbs)     Pt is assisted w/ feeding.     Pertinent Medications: MEDICATIONS  (STANDING):  midodrine. 10 milliGRAM(s) Oral every 8 hours  morphine  - Injectable 4 milliGRAM(s) IV Push every 4 hours    MEDICATIONS  (PRN):  acetaminophen   Tablet .. 650 milliGRAM(s) Oral every 6 hours PRN Temp greater or equal to 38C (100.4F), Mild Pain (1 - 3)  acetaminophen  Suppository .. 650 milliGRAM(s) Rectal every 8 hours PRN Temp greater or equal to 38C (100.4F), Mild Pain (1 - 3)    Pertinent Labs: 04-04 Na138 mmol/L Glu 74 mg/dL K+ 3.7 mmol/L Cr  3.39 mg/dL<H> BUN 47 mg/dL<H> 04-04 Alb 1.4 g/dL<L> 04-03 [id number] 5.7 %<H>  CAPILLARY BLOOD GLUCOSE    Skin: PU stage II in sacrum    Edema: Laron L ext +1    Estimated Needs:   [x] no change since previous assessment    Previous Nutrition Diagnosis:   [ ] Inadequate Energy Intake [ ]Inadequate Oral Intake [ ] Excessive Energy Intake   [ ] Underweight [ ] Increased Nutrient Needs [ ] Overweight/Obesity   [ ] Altered GI Function [ ] Unintended Weight Loss [ ] Food & Nutrition Related Knowledge Deficit [ ] Malnutrition     Nutrition Diagnosis is [x] ongoing  [ ] resolved [ ] not applicable     New Nutrition Diagnosis: [ ] not applicable     Interventions:   Recommend  [x] Continue with current diet: Dysphagia 1 Pureed-Thin Liquids  [x] Nutrition Supplement: Nepro Cans or Servings Per Day:  2       Frequency:  Two Times a day  [x] Nutrition Support: Honor pt's food preferences as feasible w/ prescribed diet.     Monitoring and Evaluation:   Continue to monitor Nutritional intake, Tolerance to diet prescription, weights, labs, skin integrity.  other:  RD remains available upon request and will follow up per protocol. Assessment:   Patient seen for: follow up    Source: [x] medical review    Factors impacting intake: [ ] none [ ] nausea  [ ] vomiting [ ] diarrhea [ ] constipation  [ ]chewing problems [ ] swallowing issues  [ ] other:     Intake: Poor, <50%    Diet Prescription: Diet, Dysphagia 1 Pureed-Thin Liquids:   Supplement Feeding Modality:  Oral  Nepro Cans or Servings Per Day:  2       Frequency:  Two Times a day (03-30-20 @ 09:56)    Current Weight: 81.2 Kg (179 Lbs) 03/29 03/27: 80.3 Kg (177 Lbs)    % Weight Change:  inc 1.1% (2 Lbs)     Pt is assisted w/ feeding.     Pertinent Medications: MEDICATIONS  (STANDING):  midodrine. 10 milliGRAM(s) Oral every 8 hours  morphine  - Injectable 4 milliGRAM(s) IV Push every 4 hours    MEDICATIONS  (PRN):  acetaminophen   Tablet .. 650 milliGRAM(s) Oral every 6 hours PRN Temp greater or equal to 38C (100.4F), Mild Pain (1 - 3)  acetaminophen  Suppository .. 650 milliGRAM(s) Rectal every 8 hours PRN Temp greater or equal to 38C (100.4F), Mild Pain (1 - 3)    Pertinent Labs: 04-04 Na138 mmol/L Glu 74 mg/dL K+ 3.7 mmol/L Cr  3.39 mg/dL<H> BUN 47 mg/dL<H> 04-04 Alb 1.4 g/dL<L> 04-03 [id number] 5.7 %<H>  CAPILLARY BLOOD GLUCOSE    Skin: PU stage II in sacrum    Edema: Laron L ext +1    Estimated Needs:   [x] no change since previous assessment    Previous Nutrition Diagnosis:     [x] Inadequate Oral Intake related to Increased Nutrient Needs as evidence by Altered Nutrition Related Lab Values, Increased Nutrient Needs poor po intake.     Nutrition Diagnosis is [x] ongoing     New Nutrition Diagnosis: [ ] not applicable     Interventions:   Recommend  [x] Continue with current diet: Dysphagia 1 Pureed-Thin Liquids  [x] Nutrition Supplement: Nepro Cans or Servings Per Day:  2       Frequency:  Two Times a day  [x] Nutrition Support: Honor pt's food preferences as feasible w/ prescribed diet.     Monitoring and Evaluation:   Continue to monitor Nutritional intake, Tolerance to diet prescription, weights, labs, skin integrity.  other:  RD remains available upon request and will follow up per protocol. Assessment:   Patient seen for: follow up    Source: [x] medical review    Factors impacting intake: [X] swallowing issues  [X] other: lack of appetite, hypoxia.     Intake: Poor, <50%    Diet Prescription: Diet, Dysphagia 1 Pureed-Thin Liquids:   Supplement Feeding Modality:  Oral  Nepro Cans or Servings Per Day:  2       Frequency:  Two Times a day (03-30-20 @ 09:56)    Current Weight: 81.2 Kg (179 Lbs) 03/29 03/27: 80.3 Kg (177 Lbs)    % Weight Change:  inc 1.1% (2 Lbs)     As per PCA, pt has poor appetite. Pt is assisted w/ feeding. PO intake <25%. No BM since 04/01. Pt does not c/o GI distress.     Pertinent Medications: MEDICATIONS  (STANDING):  midodrine. 10 milliGRAM(s) Oral every 8 hours  morphine  - Injectable 4 milliGRAM(s) IV Push every 4 hours    MEDICATIONS  (PRN):  acetaminophen   Tablet .. 650 milliGRAM(s) Oral every 6 hours PRN Temp greater or equal to 38C (100.4F), Mild Pain (1 - 3)  acetaminophen  Suppository .. 650 milliGRAM(s) Rectal every 8 hours PRN Temp greater or equal to 38C (100.4F), Mild Pain (1 - 3)    Pertinent Labs: 04-04 Na138 mmol/L Glu 74 mg/dL K+ 3.7 mmol/L Cr  3.39 mg/dL<H> BUN 47 mg/dL<H> 04-04 Alb 1.4 g/dL<L> 04-03 XzccexweqaX0M 5.7 %<H>  CAPILLARY BLOOD GLUCOSE    Skin: PU stage II in sacrum    Edema: Laron L ext +1    Estimated Needs:   [x] no change since previous assessment    Previous Nutrition Diagnosis:     [x] Inadequate Oral Intake related to Increased Nutrient Needs as evidence by Altered Nutrition Related Lab Values, Increased Nutrient Needs poor po intake.     Nutrition Diagnosis is [x] ongoing     Interventions:   Recommend  [x] Continue with current diet: Dysphagia 1 Pureed-Thin Liquids  [x] Nutrition Supplement: Nepro Cans or Servings Per Day:  2       Frequency:  Two Times a day  [x] Nutrition Support: Honor pt's food preferences as feasible w/ prescribed diet.   [x] Discuss w/ pt, family, and MD the possibility of GTF if pt does not improve PO intake in the following 4 days.     Monitoring and Evaluation:   Continue to monitor Nutritional intake, Tolerance to diet prescription, weights, labs, skin integrity.  other:  RD remains available upon request and will follow up per protocol.

## 2020-04-05 NOTE — DISCHARGE NOTE FOR THE EXPIRED PATIENT - HOSPITAL COURSE
84 years old female with history of CKD, Anemia of chronic disease, Arthritis, GOUT, CAD/HLD, right Lung Mass, sacral decub, presents with weakness, dec po intake, hypotension, +acute renal failure, hypernatremia, ATN found to have Klebsiella pneumonia bacteremia and covid 19+ . DNR/DNI comfort care

## 2020-04-14 LAB — GLUCOSE BLDC GLUCOMTR-MCNC: 93 MG/DL — SIGNIFICANT CHANGE UP (ref 70–99)

## 2020-04-15 NOTE — CHART NOTE - NSCHARTNOTEFT_GEN_A_CORE
KAYCE spoke with Pt's dtr, Gena Jaimes, and provided condolences and support. Pt's dtr states she is not the lead on making arrangements and will have her sister and/or brother Andrea call KAYCE. SW provided contact information.  Gena states her family is making arrangements for Pt to be buried at [facility] to remain available

## 2020-04-15 NOTE — CHART NOTE - NSCHARTNOTEFT_GEN_A_CORE
SW spoke with Pt's dtr, Gena, who states Pt's son confirmed he is working on arrangements for Pt's burial with [facility]. Pt has a LT policy through [employer] that the son has called, but the company was closed due to COVID. Pt's dtr will attempting to contact the policy online to arrange for burial to be paid for.  SW to remain available

## 2020-04-17 NOTE — CHART NOTE - NSCHARTNOTEFT_GEN_A_CORE
KAYCE spoke with Pt's dtr Anne who states she heard from Pt's LT [employer] policy and learned the burial arrangements will be 100% covered. Anne states she is awaiting the final documents to receive payment for burial arrangements. KAYCE provided Anne with a list of local  homes at Anne's request.  SW to remain available

## 2020-04-30 PROCEDURE — 87040 BLOOD CULTURE FOR BACTERIA: CPT

## 2020-04-30 PROCEDURE — 83550 IRON BINDING TEST: CPT

## 2020-04-30 PROCEDURE — 83735 ASSAY OF MAGNESIUM: CPT

## 2020-04-30 PROCEDURE — 85610 PROTHROMBIN TIME: CPT

## 2020-04-30 PROCEDURE — 81001 URINALYSIS AUTO W/SCOPE: CPT

## 2020-04-30 PROCEDURE — 93005 ELECTROCARDIOGRAM TRACING: CPT

## 2020-04-30 PROCEDURE — 82746 ASSAY OF FOLIC ACID SERUM: CPT

## 2020-04-30 PROCEDURE — 80053 COMPREHEN METABOLIC PANEL: CPT

## 2020-04-30 PROCEDURE — 82607 VITAMIN B-12: CPT

## 2020-04-30 PROCEDURE — 83036 HEMOGLOBIN GLYCOSYLATED A1C: CPT

## 2020-04-30 PROCEDURE — 87086 URINE CULTURE/COLONY COUNT: CPT

## 2020-04-30 PROCEDURE — 84155 ASSAY OF PROTEIN SERUM: CPT

## 2020-04-30 PROCEDURE — C1751: CPT

## 2020-04-30 PROCEDURE — 92610 EVALUATE SWALLOWING FUNCTION: CPT

## 2020-04-30 PROCEDURE — 83540 ASSAY OF IRON: CPT

## 2020-04-30 PROCEDURE — 86334 IMMUNOFIX E-PHORESIS SERUM: CPT

## 2020-04-30 PROCEDURE — 80048 BASIC METABOLIC PNL TOTAL CA: CPT

## 2020-04-30 PROCEDURE — 71045 X-RAY EXAM CHEST 1 VIEW: CPT

## 2020-04-30 PROCEDURE — 82728 ASSAY OF FERRITIN: CPT

## 2020-04-30 PROCEDURE — 36415 COLL VENOUS BLD VENIPUNCTURE: CPT

## 2020-04-30 PROCEDURE — 36556 INSERT NON-TUNNEL CV CATH: CPT

## 2020-04-30 PROCEDURE — 83605 ASSAY OF LACTIC ACID: CPT

## 2020-04-30 PROCEDURE — 82962 GLUCOSE BLOOD TEST: CPT

## 2020-04-30 PROCEDURE — 99285 EMERGENCY DEPT VISIT HI MDM: CPT | Mod: 25

## 2020-04-30 PROCEDURE — 84165 PROTEIN E-PHORESIS SERUM: CPT

## 2020-04-30 PROCEDURE — 96360 HYDRATION IV INFUSION INIT: CPT | Mod: XU

## 2020-04-30 PROCEDURE — 85379 FIBRIN DEGRADATION QUANT: CPT

## 2020-04-30 PROCEDURE — 85730 THROMBOPLASTIN TIME PARTIAL: CPT

## 2020-04-30 PROCEDURE — 87635 SARS-COV-2 COVID-19 AMP PRB: CPT

## 2020-04-30 PROCEDURE — 84145 PROCALCITONIN (PCT): CPT

## 2020-04-30 PROCEDURE — 87150 DNA/RNA AMPLIFIED PROBE: CPT

## 2020-04-30 PROCEDURE — 86140 C-REACTIVE PROTEIN: CPT

## 2020-04-30 PROCEDURE — 51702 INSERT TEMP BLADDER CATH: CPT | Mod: XU

## 2020-04-30 PROCEDURE — 85027 COMPLETE CBC AUTOMATED: CPT

## 2020-04-30 PROCEDURE — 83615 LACTATE (LD) (LDH) ENZYME: CPT

## 2020-04-30 PROCEDURE — 87186 SC STD MICRODIL/AGAR DIL: CPT

## 2020-05-26 NOTE — ED PROVIDER NOTE - MUSCULOSKELETAL [-], MLM
[de-identified] : 35M who presents with left hearing loss, ear pressure and tinnitus. He reports for 10+ yrs he has had subjective left ear hearing loss.  He denies any ENT issues otherwise.  Per the pt was seen by several outside ENTs who noted no physcial exam findings and multiple normal audiograms and tympanograms. \par \par He states that in January 2020 he developed ear pressure with nasal congestion in addition to his perceived hearing change.  He also had some rhinorrhea and pressure in the occipital area. He went to an outside ENT who did audiogram which showed normal hearing and he prescribed prednisone 20mg for 10 days. He was unable to follow up due to COVID therefore he sought follow up with another outside ENT who again prescribed him prednisone without resolution of symptoms. He comes in today for third ENT opinion.\par \par His ear symptoms include L>R hearing changes, ear pressure, nonpulsatile tinnitus and sensitivity to loud noise. He denies any otorrhea or otalgia.  As to his nasal symptoms, he admits to nasal congestion, clear rhinorrhea, hyposomia, facial pains and headaches. Taste is essentially normal.  He denies any f/c/s, URI symptoms preceding the change in January.  no neck pain/no calf pain

## 2020-08-27 NOTE — ED ADULT NURSE NOTE - PAIN RATING/NUMBER SCALE (0-10): ACTIVITY
0 Siliq Counseling:  I discussed with the patient the risks of Siliq including but not limited to new or worsening depression, suicidal thoughts and behavior, immunosuppression, malignancy, posterior leukoencephalopathy syndrome, and serious infections.  The patient understands that monitoring is required including a PPD at baseline and must alert us or the primary physician if symptoms of infection or other concerning signs are noted. There is also a special program designed to monitor depression which is required with Siliq.

## 2020-11-23 NOTE — ED ADULT NURSE NOTE - NS_ED_NURSE_TEACHING_TOPIC_ED_A_ED
Subjective     Patient ID: Juan Ortez is a 15year old female. Referring Provider: Radha Mahmood MD  PCP: Radha Mahmood MD    Juan Ortez is accompanied by Mother  If person accompanying child is not their legal guardian, then is their name listed on Authorization for Treatment of Minor by Delegated Persons? NA    Chief Complaint   Patient presents with   â¢ Spine - Pain   â¢ Office Visit       ROS:  Review of Systems   All other systems reviewed and are negative. Visit Type: NEW PATIENT / INITIAL VISIT    Chief Complaint:  Scoliosis eval    History:  Patient is a 74-year-old female who presents to clinic with her mother for evaluation of her scoliosis. Parent states they first noticed it a few years ago when it was pointed out by PCP. Pediatrician has continued to follow it and had x-rays obtained last year which demonstrated a 23 degree curve. Patient denies pain in her back or legs. Patient never has numbness, tingling or swelling in legs. Patient is an active  and runner. Patient has no difficulty with activities. Patient started her menstrual cycle about 1-1.5 years ago. Patient has a past medical history of asthma in which she takes medication for as needed. Patient has noted food allergies but no known medication allergies. Patient takes no daily medications. Medical Hx, Surgical Hx, Social Hx, ROS, Medications, Problem List, Allergies, and Vital Signs are recorded in different sections of the electronic medical record (including emergency room/ hospital records) and were reviewed during the course of this office visit. Exam: The patient is well-developed and has normal affect     THORACIC and LUMBAR SPINE: Posture is good. Normal alignment with no curvature in the upright position. Pelvis and shoulders are slightly higher on the right side. Normal scapular position. No scapular winging. No parascapular spasm or tenderness. (+) left thoracolumbar spinal curvature with forward bending. No rib elevation with forward bending. No thoracic paraspinal spasm. No thoracic paraspinal tenderness. No lumbar paraspinal spasm. No lumbar paraspinal tenderness. Full flexion and extension. Full side bending and rotation to the right and left. No pain with movements. Sciatic nerve stretch tests are negative in the right and left lower extremity. No clonus at either ankle. No motor or sensory deficits in the right or left lower extremity. Trendelenburg test is negative on the right and left. No limp. No difficulty with heel or toe walking. Imagin-3 views scoliosis films were obtained at [facility] on 10/23/2019 and personally reviewed in clinic today. Images demonstrate dextroscoliosis from T7 to T11 measures 12 degrees and levoscoliosis from T11 to L3 measures 23 degrees. Risser sign 3. PA scoliosis films were ordered, obtained and personally reviewed in clinic today. Alignment is similar to prior studies. Left thoracolumbar curve measuring 19 degress. Risser sign 4-5. Impression / diagnosis:  Left thoracolumbar scoliosis    Discussion / plan: Patient may continue to be active without restrictions. X-rays from today were reviewed by Dr. Dolly Arriaga. He recommends continued observation. Patient will follow up in 6 months for clinical reassessment in the spine clinic with repeat scoliosis films. This patient was seen in conjunction with MIGUEL Murray. I personallly obtained history, examined the patient, and reviewed relevant imaging. My findings were discussed with and have been recorded by the APN. I personally performed the medical decision making . I determined the diagnosis and the treatment plan for follow up for routine monitoring in spine clinic as described above.      Carmencita Allison MD Medications/Orthopedic

## 2021-05-12 NOTE — ED ADULT NURSE NOTE - CAS DISCH BELONGINGS RETURNED
[facility], [address] [place] [address], [address] [place] [address]  Phone: [phone]   Fax:     [phone]    Physical Therapy Daily Treatment Note  Date:  2021    Patient Name:  Irene Chowdhury    :  1976  MRN: [id number]  English       []other:    Pain level:  4/10 4/30    Yes

## 2021-07-01 NOTE — ED ADULT NURSE NOTE - CAS EDP DISCH DISPOSITION ADMI
07/01/21 1213   RT Protocol   Smoking Status 1   Surgical status 0   Respiratory pattern 0   Mental Status 0   Breath sounds 0   Cough 0   Activity level 0   Oxygen [facility]ment 0 [facility]

## 2021-09-12 NOTE — ED ADULT TRIAGE NOTE - CCCP TRG CHIEF CMPLNT
Continued Stay Note   [place]     Patient Name: Ayla Echeverria  MRN: [id number]  Today's Date: 9/12/2021    Admit Date: 9/11/2021    Discharge Plan     Row Name 09/12/21 1045       Plan    Plan Comments  BRIAN spoke with Dr Cárdenas and pts daughter, Danielle, who are agreeable for referral to be sent to [facility]. Tina, admissions for [facility], is aware of this and will be working on getting admissions nurse to hospital to sign paperwork prior to discharge to [facility].    Final Discharge Disposition Code  51 - hospice medical facility        Discharge Codes    No documentation.             Rachael Rai     lower leg pain/injury

## 2022-05-02 NOTE — ED ADULT NURSE NOTE - NSSEPSISSUSPECTED_ED_A_ED
Niacinamide Pregnancy And Lactation Text: These medications are considered safe during pregnancy. No

## 2022-06-15 NOTE — ED ADULT NURSE NOTE - PRO INTERPRETER NEED 2
----- Message from Hedy Kincaid LPN sent at 6/13/2022  4:40 PM CDT -----  Regarding: FW: GI referral    ----- Message -----  From: Pepe Victoria RN  Sent: 6/13/2022   1:39 PM CDT  To: Tk [facility] Staff  Subject: GI referral                                      I am cancelling the referral for GI from March for Dr Irwin. Please reorder as an external referral if reordering is needed.      
English

## 2022-08-16 NOTE — ED PROVIDER NOTE - CONTEXT
h/o gout Tremfya Counseling: I discussed with the patient the risks of guselkumab including but not limited to immunosuppression, serious infections, and drug reactions.  The patient understands that monitoring is required including a PPD at baseline and must alert us or the primary physician if symptoms of infection or other concerning signs are noted.

## 2023-06-06 NOTE — ED ADULT NURSE NOTE - NS TRANSFER PATIENT BELONGINGS
Bebeto Kim MD, FACP  Attending Nephrologist  Office: [phone]  Pager: [phone]  Available on [employer] teams-->Yolanda Kim None

## 2023-06-15 NOTE — ED ADULT TRIAGE NOTE - NS ED TRIAGE AVPU SCALE
JOINT PAIN Alert-The patient is alert, awake and responds to voice. The patient is oriented to time, place, and person. The triage nurse is able to obtain subjective information.

## 2023-08-22 NOTE — PATIENT PROFILE ADULT - HOME ACCESSIBILITY CONCERNS
August 22, 2023       Luba Benjamin DO  [address]  [facility] [address]  Via In Basket      Patient: Nguyen Martinez   YOB: 1976   Date of Visit: 8/22/2023       Dear Dr. Benjamin:    Thank you for referring Nguyen Martinez to me for evaluation. Below are my notes for this visit with her.    If you have questions, please do not hesitate to call me. I look forward to following your patient along with you.      Sincerely,        Steve Brandon MD        CC: Steve Dietrich MD  8/22/2023 12:55 PM  Signed  Subjective  Patient ID: Nguyen Martinez is a female.    Chief Complaint   Patient presents with   • Follow-up     4 month follow up         HPI  Today is a comprehensive follow-up CV visit for this 47-year-old female.    Review of the patient's family history at today's visit finds no family history for heart disease relating to the patient.    Since the patient's last visit see me which took place on April 25, 2023, the patient reports experiencing 1 episode of near syncope.  This near syncopal episode occurred when the patient's oral water intake was reduced from the patient's usual level.    Since the patient's last visit see me on April 25, 2023, the patient has been wearing lower extremity compression stockings on a daily basis.    The patient's blood pressure at today's visit was noted to be at the lower range of normal at 96/62 mmHg.  The patient is asymptomatic with regard to today's blood pressure reading.    At today's visit, the patient denies current chest pain.    At today's visit, the patient denies current shortness of breath.    At today's visit, the patient denies current palpitations with regard to the patient's sinus arrhythmia issue.    The patient reports no recent abnormal bleeding with regard to low-dose aspirin therapy in progress.    ECG obtained today for the patient found sinus rhythm with nonspecific  repolarization changes.  The results of today's abnormal ECG were discussed with the patient.    No medication changes or medication additions from a CV standpoint are advised for the patient at this time.  I recommended that the patient continue fludrocortisone 0.1 mg p.o. daily as is in progress for the patient's vasovagal syncope, near syncope issue (clinically).  The patient is accepting of this plan.    I again emphasized the need for a minimum of 64 ounces of oral water intake on a daily basis as well as protein intake each meal and small meals throughout the day to help with stabilization of the patient's blood sugar over the course of the day.  This blood sugar stabilization may be helpful with regard to minimizing near syncopal episodes for the patient.  The patient is accepting of the above recommendations.    I plan to resee the patient in 4 months to assess the patient's CV status as well as CV issues.    Today's comprehensive follow-up CV visit involved the dedicated evaluation and management relating to issues of: vasovagal syncope, near syncope (clinically), abnormal ECG, sinus arrhythmia, fludrocortisone therapy.      Social History  Social History     Tobacco Use   • Smoking status: Never   • Smokeless tobacco: Never   Substance Use Topics   • Alcohol use: Not Currently     Alcohol/week: 1.0 - 2.0 standard drink of alcohol     Types: 1 - 2 Standard drinks or equivalent per week     Comment: socially   • Drug use: Never        Allergies  ALLERGIES:   Allergen Reactions   • Kiwi   (Food Or Med) HIVES and ANAPHYLAXIS       Presenting Medications  Current Outpatient Medications   Medication Sig Dispense Refill   • fludrocortisone (FLORINEF) 0.1 MG tablet Take 0.5 tablets by mouth daily. 90 tablet 3   • Omega-3 Fatty Acids (Fish Oil) 1200 MG capsule Take 1,200 mg by mouth 2 times daily.     • aspirin 81 MG tablet Take by mouth daily.     • Elastic Bandages & Supports (MEDICAL COMPRESSION STOCKINGS) Misc  1 Package daily. Wear stockings daily 1 each 1     No current facility-administered medications for this visit.       Review of Systems  Review of Systems   Constitutional: Negative.   HENT: Negative.    Eyes: Negative.    Cardiovascular: Positive for near-syncope. Negative for chest pain, claudication, cyanosis, dyspnea on exertion, irregular heartbeat, leg swelling, orthopnea, palpitations, paroxysmal nocturnal dyspnea and syncope.   Respiratory: Negative.    Endocrine: Negative.    Hematologic/Lymphatic: Negative.    Skin: Negative.    Musculoskeletal: Negative.    Gastrointestinal: Negative.    Genitourinary: Negative.    Neurological: Negative.    Psychiatric/Behavioral: Negative.    Allergic/Immunologic: Negative.        Physical Exam  Visit Vitals  BP 96/62 (BP Location: LUE - Left upper extremity, Patient Position: Sitting, Cuff Size: Regular)   Pulse 88   Ht 5' 8\" (1.727 m)   Wt 87.5 kg (193 lb 0.2 oz)   SpO2 97%   BMI 29.35 kg/m²     Vital signs were reviewed today.    Physical Exam   Constitutional: She appears healthy. No distress.   HENT:   Nose: Nose normal. No nasal discharge.   Eyes: Pupils are equal, round, and reactive to light. Conjunctivae are normal.   Neck: No JVD present. No neck adenopathy.   Cardiovascular: Normal rate, regular rhythm, S1 normal, S2 normal and intact distal pulses.  No extrasystoles are present. PMI is not displaced. Exam reveals no gallop, no S3, no S4, no distant heart sounds, no friction rub, no midsystolic click and no opening snap.   No murmur heard.  Pulses:       Carotid pulses are 1+ on the right side and 1+ on the left side.       Radial pulses are 1+ on the right side and 1+ on the left side.        Femoral pulses are 1+ on the right side and 1+ on the left side.       Popliteal pulses are 1+ on the right side and 1+ on the left side.        Dorsalis pedis pulses are 1+ on the right side and 1+ on the left side.        Posterior tibial pulses are 1+ on the right  side and 1+ on the left side.   Pulmonary/Chest: Effort normal and breath sounds normal. No stridor. She has no wheezes. She has no rales. She exhibits no tenderness.   Abdominal: Soft. Bowel sounds are normal. She exhibits no distension and no mass. There is no splenomegaly or hepatomegaly. There is no abdominal tenderness. No hernia.   Musculoskeletal:         General: No tenderness, deformity or edema. Normal range of motion.      Cervical back: Normal range of motion and neck supple.   Neurological: She is alert and oriented to person, place, and time. She has normal motor skills and intact cranial nerves (2-12). Gait normal.   Skin: Skin is warm and dry. No rash noted. No cyanosis. No jaundice, pallor or plethora. Nails show no clubbing.       Recent Labs    No results for input(s): \"CHOLESTEROL\", \"HDL\", \"TRIGLYCERIDE\", \"CALCLDL\", \"LDLDIR\", \"NONHDL\" in the last 8765 hours.    No results for input(s): \"SODIUM\", \"CHLORIDE\", \"BUN\", \"GFRA\", \"BCRAT\", \"POTASSIUM\", \"C02\", \"GLUCOSE\", \"CREATININE\", \"GFRNA\", \"CALCIUM\", \"BNP\", \"TSH\" in the last 8765 hours.    Invalid input(s): \"AGAP\", \"FST1\"       Hemoglobin A1C (%)   Date Value   07/24/2020 5.6   .    No results for input(s): \"WBC\", \"RBC\", \"HGB\", \"HCT\", \"MCV\", \"MCHC\", \"RDWCV\", \"PLT\", \"TLYMPH\" in the last 8765 hours.    No results for input(s): \"ALB\", \"DBIL\", \"GPT\", \"TP\" in the last 8765 hours.    Invalid input(s): \"TBIL\", \"ALKP\", \"GOT\"      Assessment / Plan  Patient Active Problem List   Diagnosis   • Allergic rhinitis   • Anxiety   • Atopic dermatitis   • Cervical radiculopathy   • Contraception management   • Dense breast tissue   • DAVID positive for HSV   • Encounter for screening mammogram for breast cancer   • Inconclusive mammogram due to dense breasts   • Neck pain   • Retinal defect   • Well female exam with routine gynecological exam   • Routine screening for STI (sexually transmitted infection)   • History of TIA (transient ischemic attack)   • Encounter for  monitoring low dose aspirin therapy   • Family history of colon cancer   • Syncope and collapse   • Mixed hyperlipidemia   • Pure hypercholesterolemia   • Abnormal electrocardiogram (ECG) (EKG)   • Sinus arrhythmia         Steve Brandon MD, [facility], [facility] Cardiologist     none

## 2024-02-05 NOTE — ED ADULT NURSE NOTE - RADIATION
Patient made aware of CT results and recommendations.  She voiced understanding and is agreeable to pulmonology consultation.       no radiation

## 2024-03-31 NOTE — CONSULT NOTE ADULT - CONSULT REQUESTED BY NAME
Dr Smith Group Topic: BH Activity Group    Date: 3/30/2024  Start Time: 2015  End Time: 2045  Facilitators: Martha Avelar HUC    Focus: Passing around the \"anxiety thumb ball\"--passing from patient to patient and having each patient answer the question that their right thumb lands on.  Number in attendance: 11    Method: Group   Attendance:  Offered group but refused group.

## 2024-06-06 NOTE — ED PROCEDURE NOTE - NS ED ATTENDING STATEMENT MOD
Attending Only
bilateral upper extremity Passive ROM was WFL (within functional limits)/bilateral lower extremity Passive ROM was WFL (within functional limits)

## 2024-09-04 NOTE — ED PROVIDER NOTE - NS CPE EDP MUSC SPINE EXAM
50-year-old female with history of POTS and asthma presents for follow-up.  She has a history of dysphagia, epigastric pain, and IBS with alternating bowel habits.  Results summarized below.  She was last seen in the office in May 2020 for.  She was doing well at the time on pantoprazole 40 mg once a day.  Her pantoprazole was changed to famotidine 40 mg daily to see if this would manage her chronic GERD symptoms.  High-fiber diet and daily fiber therapy was recommended to regulate her bowel habits.  She follows up today reporting that she has not started famotdine because it was not covered by her insurance. She has been doing well on pantoprazole 40mg daily. She ran out of pantoprazole and had some recurrent dyspepsia. She has no other GI complaints today..   She was initially seen in September 2023 with complaints of dysphagia with pills and solid foods.  She was choking frequently and regurgitating her pills.  She reported choking on a fishbone as a child and had issues with dysphagia ever since then.  She also reported alternating bowel habits with constipation and diarrhea.  She also described intermittent episodes of sharp epigastric pain which occurred intermittently every day.  Barium swallow September 25, 2023 showed a distended stomach with air.  The barium pill transited to the stomach without issue.  The esophagus appeared normal.  There was moderate GERD.  Abdominal ultrasound September 15, 2023 showed no acute abnormality.  The gallbladder appeared normal with no stones or signs of cholecystitis.  Lab September 8, 2023 including CBC, CMP and lipase were unremarkable.  EGD 9/26/2023 demonstrated an irregular Z-line at the GE junction.  Biopsy of the GE junction showed P ET hyperplasia, distortion and inflammation, probably from the top of the proximalmost gastric folds.  No evidence of intestinal metaplasia.  Biopsy of the middle third of the esophagus showed reflux type changes.  Esophageal dilation was performed with a Sharma dilator with no resistance at [address] [place].  There was moderate inflammation in the gastric antrum and in the prepyloric region of the stomach.  Gastric antral biopsy was negative for H. pylori.  Atrophic mucosa was found in the second portion of the duodenum.  The biopsy showed no significant abnormality.  Baseline screening colonoscopy was done October 18, 2022 which was normal to the cecum with the exception of grade 1 internal hemorrhoids.  Repeat colonoscopy was recommended in 10 years.  She has no family history of colon cancer.
no deformity, pain or tenderness. no restriction of movement

## 2024-09-20 NOTE — ED ADULT NURSE NOTE - NSFALLRSKASSESASSIST_ED_ALL_ED
"Chief Complaint   Patient presents with    Hypertension     /76   Pulse 100   Temp 99.9  F (37.7  C) (Tympanic)   Resp 16   Wt 110.2 kg (243 lb)   SpO2 98%   BMI 40.44 kg/m   Estimated body mass index is 40.44 kg/m  as calculated from the following:    Height as of 8/30/24: 1.651 m (5' 5\").    Weight as of this encounter: 110.2 kg (243 lb).  Patient presents to the clinic using No DME      Health Maintenance that is potentially due pending provider review:    Health Maintenance Due   Topic Date Due    INFLUENZA VACCINE (1) 09/01/2024    COVID-19 Vaccine (4 - 2024-25 season) 09/01/2024        Declines vaccines today          " yes

## 2024-11-09 NOTE — ED ADULT TRIAGE NOTE - NS ED TRIAGE HISTORIAN
Patient change in mental status/difficulty chewing/difficulty feeding self/difficulty swallowing/Alevism/ethnic/cultural/personal food preferences

## 2025-04-01 NOTE — ED ADULT NURSE NOTE - NS ED PATIENT SAFETY CONCERN
Last time he had elevated TSH 5.3 in emergency room.  Will follow TSH and check free T4  .  Orders:  •  TSH, 3rd generation with Free T4 reflex; Future   No

## 2025-04-14 NOTE — ED PROVIDER NOTE - RESPIRATORY NEGATIVE STATEMENT, MLM
Skin normal color for race, warm, dry and intact. No evidence of rash.
no chest pain, no cough, and no shortness of breath.

## 2025-05-08 NOTE — PATIENT PROFILE ADULT - NSPROEDALEARNPREF_GEN_A_NUR
Last appt 2/25/2025      Next Apt:     No future appointments.      [id number] - [place], [place] - [address] -  [phone] F [phone] [address] [address]  Phone: [phone] Fax: [phone]      verbal instruction

## 2025-05-08 NOTE — ED PROVIDER NOTE - ENMT, MLM
Picato Pregnancy And Lactation Text: This medication is Pregnancy Category C. It is unknown if this medication is excreted in breast milk. Benzoyl Peroxide Counseling: Patient counseled that medicine may cause skin irritation and bleach clothing.  In the event of skin irritation, the patient was advised to reduce the amount of the drug applied or use it less frequently.   The patient verbalized understanding of the proper use and possible adverse effects of benzoyl peroxide.  All of the patient's questions and concerns were addressed. Siliq Counseling:  I discussed with the patient the risks of Siliq including but not limited to new or worsening depression, suicidal thoughts and behavior, immunosuppression, malignancy, posterior leukoencephalopathy syndrome, and serious infections.  The patient understands that monitoring is required including a PPD at baseline and must alert us or the primary physician if symptoms of infection or other concerning signs are noted. There is also a special program designed to monitor depression which is required with Siliq. Azithromycin Pregnancy And Lactation Text: This medication is considered safe during pregnancy and is also secreted in breast milk. Cellcept Pregnancy And Lactation Text: This medication is Pregnancy Category D and isn't considered safe during pregnancy. It is unknown if this medication is excreted in breast milk. Doxepin Pregnancy And Lactation Text: This medication is Pregnancy Category C and it isn't known if it is safe during pregnancy. It is also excreted in breast milk and breast feeding isn't recommended. Humira Pregnancy And Lactation Text: This medication is Pregnancy Category B and is considered safe during pregnancy. It is unknown if this medication is excreted in breast milk. MMM Propranolol Pregnancy And Lactation Text: This medication is Pregnancy Category C and it isn't known if it is safe during pregnancy. It is excreted in breast milk. Vtama Pregnancy And Lactation Text: It is unknown if this medication can cause problems during pregnancy and breastfeeding. Soolantra Counseling: I discussed with the patients the risks of topial Soolantra. This is a medicine which decreases the number of mites and inflammation in the skin. You experience burning, stinging, eye irritation or allergic reactions.  Please call our office if you develop any problems from using this medication. Rinvoq Pregnancy And Lactation Text: Based on animal studies, Rinvoq may cause embryo-fetal harm when administered to pregnant women.  The medication should not be used in pregnancy.  Breastfeeding is not recommended during treatment and for 6 days after the last dose. Cibinqo Counseling: I discussed with the patient the risks of Cibinqo therapy including but not limited to common cold, nausea, headache, cold sores, increased blood CPK levels, dizziness, UTIs, fatigue, acne, and vomitting. Live vaccines should be avoided.  This medication has been linked to serious infections; higher rate of mortality; malignancy and lymphoproliferative disorders; major adverse cardiovascular events; thrombosis; thrombocytopenia and lymphopenia; lipid elevations; and retinal detachment. Topical Steroids Counseling: I discussed with the patient that prolonged use of topical steroids can result in the increased appearance of superficial blood vessels (telangiectasias), lightening (hypopigmentation) and thinning of the skin (atrophy).  Patient understands to avoid using high potency steroids in skin folds, the groin or the face.  The patient verbalized understanding of the proper use and possible adverse effects of topical steroids.  All of the patient's questions and concerns were addressed. Olanzapine Counseling- I discussed with the patient the common side effects of olanzapine including but are not limited to: lack of energy, dry mouth, increased appetite, sleepiness, tremor, constipation, dizziness, changes in behavior, or restlessness.  Explained that teenagers are more likely to experience headaches, abdominal pain, pain in the arms or legs, tiredness, and sleepiness.  Serious side effects include but are not limited: increased risk of death in elderly patients who are confused, have memory loss, or dementia-related psychosis; hyperglycemia; increased cholesterol and triglycerides; and weight gain. Klisyri Counseling:  I discussed with the patient the risks of Klisyri including but not limited to erythema, scaling, itching, weeping, crusting, and pain. Isotretinoin Counseling: Patient should get monthly blood tests, not donate blood, not drive at night if vision affected, not share medication, and not undergo elective surgery for 6 months after tx completed. Side effects reviewed, pt to contact office should one occur. Bimzelx Counseling:  I discussed with the patient the risks of Bimzelx including but not limited to depression, immunosuppression, allergic reactions and infections.  The patient understands that monitoring is required including a PPD at baseline and must alert us or the primary physician if symptoms of infection or other concerning signs are noted. Finasteride Pregnancy And Lactation Text: This medication is absolutely contraindicated during pregnancy. It is unknown if it is excreted in breast milk. Drysol Pregnancy And Lactation Text: This medication is considered safe during pregnancy and breast feeding. Glycopyrrolate Pregnancy And Lactation Text: This medication is Pregnancy Category B and is considered safe during pregnancy. It is unknown if it is excreted breast milk. Taltz Counseling: I discussed with the patient the risks of ixekizumab including but not limited to immunosuppression, serious infections, worsening of inflammatory bowel disease and drug reactions.  The patient understands that monitoring is required including a PPD at baseline and must alert us or the primary physician if symptoms of infection or other concerning signs are noted. Sarecycline Counseling: Patient advised regarding possible photosensitivity and discoloration of the teeth, skin, lips, tongue and gums.  Patient instructed to avoid sunlight, if possible.  When exposed to sunlight, patients should wear protective clothing, sunglasses, and sunscreen.  The patient was instructed to call the office immediately if the following severe adverse effects occur:  hearing changes, easy bruising/bleeding, severe headache, or vision changes.  The patient verbalized understanding of the proper use and possible adverse effects of sarecycline.  All of the patient's questions and concerns were addressed. Erythromycin Pregnancy And Lactation Text: This medication is Pregnancy Category B and is considered safe during pregnancy. It is also excreted in breast milk. Benzoyl Peroxide Pregnancy And Lactation Text: This medication is Pregnancy Category C. It is unknown if benzoyl peroxide is excreted in breast milk. Cyclophosphamide Counseling:  I discussed with the patient the risks of cyclophosphamide including but not limited to hair loss, hormonal abnormalities, decreased fertility, abdominal pain, diarrhea, nausea and vomiting, bone marrow suppression and infection. The patient understands that monitoring is required while taking this medication. Soolantra Pregnancy And Lactation Text: This medication is Pregnancy Category C. This medication is considered safe during breast feeding. Bactrim Counseling:  I discussed with the patient the risks of sulfa antibiotics including but not limited to GI upset, allergic reaction, drug rash, diarrhea, dizziness, photosensitivity, and yeast infections.  Rarely, more serious reactions can occur including but not limited to aplastic anemia, agranulocytosis, methemoglobinemia, blood dyscrasias, liver or kidney failure, lung infiltrates or desquamative/blistering drug rashes. Siliq Pregnancy And Lactation Text: The risk during pregnancy and breastfeeding is uncertain with this medication. Libtayo Pregnancy And Lactation Text: This medication is contraindicated in pregnancy and when breast feeding. Itraconazole Pregnancy And Lactation Text: This medication is Pregnancy Category C and it isn't know if it is safe during pregnancy. It is also excreted in breast milk. Hydroxyzine Counseling: Patient advised that the medication is sedating and not to drive a car after taking this medication.  Patient informed of potential adverse effects including but not limited to dry mouth, urinary retention, and blurry vision.  The patient verbalized understanding of the proper use and possible adverse effects of hydroxyzine.  All of the patient's questions and concerns were addressed. Clofazimine Counseling:  I discussed with the patient the risks of clofazimine including but not limited to skin and eye pigmentation, liver damage, nausea/vomiting, gastrointestinal bleeding and allergy. Cibinqo Pregnancy And Lactation Text: It is unknown if this medication will adversely affect pregnancy or breast feeding.  You should not take this medication if you are currently pregnant or planning a pregnancy or while breastfeeding. Hyrimoz Counseling:  I discussed with the patient the risks of adalimumab including but not limited to myelosuppression, immunosuppression, autoimmune hepatitis, demyelinating diseases, lymphoma, and serious infections.  The patient understands that monitoring is required including a PPD at baseline and must alert us or the primary physician if symptoms of infection or other concerning signs are noted. Klisyri Pregnancy And Lactation Text: It is unknown if this medication can harm a developing fetus or if it is excreted in breast milk. SSKI Counseling:  I discussed with the patient the risks of SSKI including but not limited to thyroid abnormalities, metallic taste, GI upset, fever, headache, acne, arthralgias, paraesthesias, lymphadenopathy, easy bleeding, arrhythmias, and allergic reaction. Zoryve Counseling:  I discussed with the patient that Zoryve is not for use in the eyes, mouth or vagina. The most commonly reported side effects include diarrhea, headache, insomnia, application site pain, upper respiratory tract infections, and urinary tract infections.  All of the patient's questions and concerns were addressed. Olanzapine Pregnancy And Lactation Text: This medication is pregnancy category C.   There are no adequate and well controlled trials with olanzapine in pregnant females.  Olanzapine should be used during pregnancy only if the potential benefit justifies the potential risk to the fetus.   In a study in lactating healthy women, olanzapine was excreted in breast milk.  It is recommended that women taking olanzapine should not breast feed. Topical Steroids Applications Pregnancy And Lactation Text: Most topical steroids are considered safe to use during pregnancy and lactation.  Any topical steroid applied to the breast or nipple should be washed off before breastfeeding. Protopic Counseling: Patient may experience a mild burning sensation during topical application. Protopic is not approved in children less than 2 years of age. There have been case reports of hematologic and skin malignancies in patients using topical calcineurin inhibitors although causality is questionable. Sotyktu Counseling:  I discussed the most common side effects of Sotyktu including: common cold, sore throat, sinus infections, cold sores, canker sores, folliculitis, and acne.? I also discussed more serious side effects of Sotyktu including but not limited to: serious allergic reactions; increased risk for infections such as TB; cancers such as lymphomas; rhabdomyolysis and elevated CPK; and elevated triglycerides and liver enzymes.? Hydroxychloroquine Counseling:  I discussed with the patient that a baseline ophthalmologic exam is needed at the start of therapy and every year thereafter while on therapy. A CBC may also be warranted for monitoring.  The side effects of this medication were discussed with the patient, including but not limited to agranulocytosis, aplastic anemia, seizures, rashes, retinopathy, and liver toxicity. Patient instructed to call the office should any adverse effect occur.  The patient verbalized understanding of the proper use and possible adverse effects of Plaquenil.  All the patient's questions and concerns were addressed. Tazorac Counseling:  Patient advised that medication is irritating and drying.  Patient may need to apply sparingly and wash off after an hour before eventually leaving it on overnight.  The patient verbalized understanding of the proper use and possible adverse effects of tazorac.  All of the patient's questions and concerns were addressed. Isotretinoin Pregnancy And Lactation Text: This medication is Pregnancy Category X and is considered extremely dangerous during pregnancy. It is unknown if it is excreted in breast milk. Sarecycline Pregnancy And Lactation Text: This medication is Pregnancy Category D and not consider safe during pregnancy. It is also excreted in breast milk. Birth Control Pills Counseling: Birth Control Pill Counseling: I discussed with the patient the potential side effects of OCPs including but not limited to increased risk of stroke, heart attack, thrombophlebitis, deep venous thrombosis, hepatic adenomas, breast changes, GI upset, headaches, and depression.  The patient verbalized understanding of the proper use and possible adverse effects of OCPs. All of the patient's questions and concerns were addressed. Topical Retinoid counseling:  Patient advised to apply a pea-sized amount only at bedtime and wait 30 minutes after washing their face before applying.  If too drying, patient may add a non-comedogenic moisturizer. The patient verbalized understanding of the proper use and possible adverse effects of retinoids.  All of the patient's questions and concerns were addressed. Metronidazole Counseling:  I discussed with the patient the risks of metronidazole including but not limited to seizures, nausea/vomiting, a metallic taste in the mouth, nausea/vomiting and severe allergy. Bimzelx Pregnancy And Lactation Text: This medication crosses the placenta and the safety is uncertain during pregnancy. It is unknown if this medication is present in breast milk. Cyclophosphamide Pregnancy And Lactation Text: This medication is Pregnancy Category D and it isn't considered safe during pregnancy. This medication is excreted in breast milk. Elidel Counseling: Patient may experience a mild burning sensation during topical application. Elidel is not approved in children less than 2 years of age. There have been case reports of hematologic and skin malignancies in patients using topical calcineurin inhibitors although causality is questionable. Clofazimine Pregnancy And Lactation Text: This medication is Pregnancy Category C and isn't considered safe during pregnancy. It is excreted in breast milk. Carac Counseling:  I discussed with the patient the risks of Carac including but not limited to erythema, scaling, itching, weeping, crusting, and pain. Ketoconazole Counseling:   Patient counseled regarding improving absorption with orange juice.  Adverse effects include but are not limited to breast enlargement, headache, diarrhea, nausea, upset stomach, liver function test abnormalities, taste disturbance, and stomach pain.  There is a rare possibility of liver failure that can occur when taking ketoconazole. The patient understands that monitoring of LFTs may be required, especially at baseline. The patient verbalized understanding of the proper use and possible adverse effects of ketoconazole.  All of the patient's questions and concerns were addressed. Simponi Counseling:  I discussed with the patient the risks of golimumab including but not limited to myelosuppression, immunosuppression, autoimmune hepatitis, demyelinating diseases, lymphoma, and serious infections.  The patient understands that monitoring is required including a PPD at baseline and must alert us or the primary physician if symptoms of infection or other concerning signs are noted. Bactrim Pregnancy And Lactation Text: This medication is Pregnancy Category D and is known to cause fetal risk.  It is also excreted in breast milk. Odomzo Counseling- I discussed with the patient the risks of Odomzo including but not limited to nausea, vomiting, diarrhea, constipation, weight loss, changes in the sense of taste, decreased appetite, muscle spasms, and hair loss.  The patient verbalized understanding of the proper use and possible adverse effects of Odomzo.  All of the patient's questions and concerns were addressed. Protopic Pregnancy And Lactation Text: This medication is Pregnancy Category C. It is unknown if this medication is excreted in breast milk when applied topically. Sski Pregnancy And Lactation Text: This medication is Pregnancy Category D and isn't considered safe during pregnancy. It is excreted in breast milk. Hydroxyzine Pregnancy And Lactation Text: This medication is not safe during pregnancy and should not be taken. It is also excreted in breast milk and breast feeding isn't recommended. Tetracycline Counseling: Patient counseled regarding possible photosensitivity and increased risk for sunburn.  Patient instructed to avoid sunlight, if possible.  When exposed to sunlight, patients should wear protective clothing, sunglasses, and sunscreen.  The patient was instructed to call the office immediately if the following severe adverse effects occur:  hearing changes, easy bruising/bleeding, severe headache, or vision changes.  The patient verbalized understanding of the proper use and possible adverse effects of tetracycline.  All of the patient's questions and concerns were addressed. Patient understands to avoid pregnancy while on therapy due to potential birth defects. Minoxidil Counseling: Minoxidil is a topical medication which can increase blood flow where it is applied. It is uncertain how this medication increases hair growth. Side effects are uncommon and include stinging and allergic reactions. Sotyktu Pregnancy And Lactation Text: There is insufficient data to evaluate whether or not Sotyktu is safe to use during pregnancy.? ?It is not known if Sotyktu passes into breast milk and whether or not it is safe to use when breastfeeding.?? Topical Sulfur Applications Counseling: Topical Sulfur Counseling: Patient counseled that this medication may cause skin irritation or allergic reactions.  In the event of skin irritation, the patient was advised to reduce the amount of the drug applied or use it less frequently.   The patient verbalized understanding of the proper use and possible adverse effects of topical sulfur application.  All of the patient's questions and concerns were addressed. Oral Minoxidil Counseling- I discussed with the patient the risks of oral minoxidil including but not limited to shortness of breath, swelling of the feet or ankles, dizziness, lightheadedness, unwanted hair growth and allergic reaction.  The patient verbalized understanding of the proper use and possible adverse effects of oral minoxidil.  All of the patient's questions and concerns were addressed. Cosentyx Counseling:  I discussed with the patient the risks of Cosentyx including but not limited to worsening of Crohn's disease, immunosuppression, allergic reactions and infections.  The patient understands that monitoring is required including a PPD at baseline and must alert us or the primary physician if symptoms of infection or other concerning signs are noted. Tazorac Pregnancy And Lactation Text: This medication is not safe during pregnancy. It is unknown if this medication is excreted in breast milk. Tremfya Counseling: I discussed with the patient the risks of guselkumab including but not limited to immunosuppression, serious infections, and drug reactions.  The patient understands that monitoring is required including a PPD at baseline and must alert us or the primary physician if symptoms of infection or other concerning signs are noted. Ketoconazole Pregnancy And Lactation Text: This medication is Pregnancy Category C and it isn't know if it is safe during pregnancy. It is also excreted in breast milk and breast feeding isn't recommended. High Dose Vitamin A Counseling: Side effects reviewed, pt to contact office should one occur. Birth Control Pills Pregnancy And Lactation Text: This medication should be avoided if pregnant and for the first 30 days post-partum. Hydroxychloroquine Pregnancy And Lactation Text: This medication has been shown to cause fetal harm but it isn't assigned a Pregnancy Risk Category. There are small amounts excreted in breast milk. Odomzo Pregnancy And Lactation Text: This medication is Pregnancy Category X and is absolutely contraindicated during pregnancy. It is unknown if it is excreted in breast milk. Qbrexza Counseling:  I discussed with the patient the risks of Qbrexza including but not limited to headache, mydriasis, blurred vision, dry eyes, nasal dryness, dry mouth, dry throat, dry skin, urinary hesitation, and constipation.  Local skin reactions including erythema, burning, stinging, and itching can also occur. Metronidazole Pregnancy And Lactation Text: This medication is Pregnancy Category B and considered safe during pregnancy.  It is also excreted in breast milk. Carac Pregnancy And Lactation Text: This medication is Pregnancy Category X and contraindicated in pregnancy and in women who may become pregnant. It is unknown if this medication is excreted in breast milk. Colchicine Counseling:  Patient counseled regarding adverse effects including but not limited to stomach upset (nausea, vomiting, stomach pain, or diarrhea).  Patient instructed to limit alcohol consumption while taking this medication.  Colchicine may reduce blood counts especially with prolonged use.  The patient understands that monitoring of kidney function and blood counts may be required, especially at baseline. The patient verbalized understanding of the proper use and possible adverse effects of colchicine.  All of the patient's questions and concerns were addressed. Zyclara Counseling:  I discussed with the patient the risks of imiquimod including but not limited to erythema, scaling, itching, weeping, crusting, and pain.  Patient understands that the inflammatory response to imiquimod is variable from person to person and was educated regarded proper titration schedule.  If flu-like symptoms develop, patient knows to discontinue the medication and contact us. Cimzia Counseling:  I discussed with the patient the risks of Cimzia including but not limited to immunosuppression, allergic reactions and infections.  The patient understands that monitoring is required including a PPD at baseline and must alert us or the primary physician if symptoms of infection or other concerning signs are noted. Cyclosporine Counseling:  I discussed with the patient the risks of cyclosporine including but not limited to hypertension, gingival hyperplasia,myelosuppression, immunosuppression, liver damage, kidney damage, neurotoxicity, lymphoma, and serious infections. The patient understands that monitoring is required including baseline blood pressure, CBC, CMP, lipid panel and uric acid, and then 1-2 times monthly CMP and blood pressure. Thalidomide Counseling: I discussed with the patient the risks of thalidomide including but not limited to birth defects, anxiety, weakness, chest pain, dizziness, cough and severe allergy. Cephalexin Counseling: I counseled the patient regarding use of cephalexin as an antibiotic for prophylactic and/or therapeutic purposes. Cephalexin (commonly prescribed under brand name Keflex) is a cephalosporin antibiotic which is active against numerous classes of bacteria, including most skin bacteria. Side effects may include nausea, diarrhea, gastrointestinal upset, rash, hives, yeast infections, and in rare cases, hepatitis, kidney disease, seizures, fever, confusion, neurologic symptoms, and others. Patients with severe allergies to penicillin medications are cautioned that there is about a 10% incidence of cross-reactivity with cephalosporins. When possible, patients with penicillin allergies should use alternatives to cephalosporins for antibiotic therapy. Topical Sulfur Applications Pregnancy And Lactation Text: This medication is considered safe during pregnancy and breast feeding secondary to limited systemic absorption. Ilumya Counseling: I discussed with the patient the risks of tildrakizumab including but not limited to immunosuppression, malignancy, posterior leukoencephalopathy syndrome, and serious infections.  The patient understands that monitoring is required including a PPD at baseline and must alert us or the primary physician if symptoms of infection or other concerning signs are noted. High Dose Vitamin A Pregnancy And Lactation Text: High dose vitamin A therapy is contraindicated during pregnancy and breast feeding. Oral Minoxidil Pregnancy And Lactation Text: This medication should only be used when clearly needed if you are pregnant, attempting to become pregnant or breast feeding. Eucrisa Counseling: Patient may experience a mild burning sensation during topical application. Eucrisa is not approved in children less than 3 months of age. Xeljanz Counseling: I discussed with the patient the risks of Xeljanz therapy including increased risk of infection, liver issues, headache, diarrhea, or cold symptoms. Live vaccines should be avoided. They were instructed to call if they have any problems. Minoxidil Pregnancy And Lactation Text: This medication has not been assigned a Pregnancy Risk Category but animal studies failed to show danger with the topical medication. It is unknown if the medication is excreted in breast milk. Low Dose Naltrexone Counseling- I discussed with the patient the potential risks and side effects of low dose naltrexone including but not limited to: more vivid dreams, headaches, nausea, vomiting, abdominal pain, fatigue, dizziness, and anxiety. Topical Clindamycin Counseling: Patient counseled that this medication may cause skin irritation or allergic reactions.  In the event of skin irritation, the patient was advised to reduce the amount of the drug applied or use it less frequently.   The patient verbalized understanding of the proper use and possible adverse effects of clindamycin.  All of the patient's questions and concerns were addressed. Albendazole Counseling:  I discussed with the patient the risks of albendazole including but not limited to cytopenia, kidney damage, nausea/vomiting and severe allergy.  The patient understands that this medication is being used in an off-label manner. Minocycline Counseling: Patient advised regarding possible photosensitivity and discoloration of the teeth, skin, lips, tongue and gums.  Patient instructed to avoid sunlight, if possible.  When exposed to sunlight, patients should wear protective clothing, sunglasses, and sunscreen.  The patient was instructed to call the office immediately if the following severe adverse effects occur:  hearing changes, easy bruising/bleeding, severe headache, or vision changes.  The patient verbalized understanding of the proper use and possible adverse effects of minocycline.  All of the patient's questions and concerns were addressed. Calcipotriene Counseling:  I discussed with the patient the risks of calcipotriene including but not limited to erythema, scaling, itching, and irritation. Spironolactone Counseling: Patient advised regarding risks of diarrhea, abdominal pain, hyperkalemia, birth defects (for female patients), liver toxicity and renal toxicity. The patient may need blood work to monitor liver and kidney function and potassium levels while on therapy. The patient verbalized understanding of the proper use and possible adverse effects of spironolactone.  All of the patient's questions and concerns were addressed. Terbinafine Counseling: Patient counseling regarding adverse effects of terbinafine including but not limited to headache, diarrhea, rash, upset stomach, liver function test abnormalities, itching, taste/smell disturbance, nausea, abdominal pain, and flatulence.  There is a rare possibility of liver failure that can occur when taking terbinafine.  The patient understands that a baseline LFT and kidney function test may be required. The patient verbalized understanding of the proper use and possible adverse effects of terbinafine.  All of the patient's questions and concerns were addressed. Cimzia Pregnancy And Lactation Text: This medication crosses the placenta but can be considered safe in certain situations. Cimzia may be excreted in breast milk. Cyclosporine Pregnancy And Lactation Text: This medication is Pregnancy Category C and it isn't know if it is safe during pregnancy. This medication is excreted in breast milk. Qbrexza Pregnancy And Lactation Text: There is no available data on Qbrexza use in pregnant women.  There is no available data on Qbrexza use in lactation. Skyrizi Counseling: I discussed with the patient the risks of risankizumab-rzaa including but not limited to immunosuppression, and serious infections.  The patient understands that monitoring is required including a PPD at baseline and must alert us or the primary physician if symptoms of infection or other concerning signs are noted. Cephalexin Pregnancy And Lactation Text: This medication is Pregnancy Category B and considered safe during pregnancy.  It is also excreted in breast milk but can be used safely for shorter doses. Detail Level: Simple Xeldaniellez Pregnancy And Lactation Text: This medication is Pregnancy Category D and is not considered safe during pregnancy.  The risk during breast feeding is also uncertain. Albendazole Pregnancy And Lactation Text: This medication is Pregnancy Category C and it isn't known if it is safe during pregnancy. It is also excreted in breast milk. Dupixent Counseling: I discussed with the patient the risks of dupilumab including but not limited to eye infection and irritation, cold sores, injection site reactions, worsening of asthma, allergic reactions and increased risk of parasitic infection.  Live vaccines should be avoided while taking dupilumab. Dupilumab will also interact with certain medications such as warfarin and cyclosporine. The patient understands that monitoring is required and they must alert us or the primary physician if symptoms of infection or other concerning signs are noted. Wartpeel Counseling:  I discussed with the patient the risks of Wartpeel including but not limited to erythema, scaling, itching, weeping, crusting, and pain. Otezla Counseling: The side effects of Otezla were discussed with the patient, including but not limited to worsening or new depression, weight loss, diarrhea, nausea, upper respiratory tract infection, and headache. Patient instructed to call the office should any adverse effect occur.  The patient verbalized understanding of the proper use and possible adverse effects of Otezla.  All the patient's questions and concerns were addressed. Mirvaso Counseling: Mirvaso is a topical medication which can decrease superficial blood flow where applied. Side effects are uncommon and include stinging, redness and allergic reactions. Topical Clindamycin Pregnancy And Lactation Text: This medication is Pregnancy Category B and is considered safe during pregnancy. It is unknown if it is excreted in breast milk. Spironolactone Pregnancy And Lactation Text: This medication can cause feminization of the male fetus and should be avoided during pregnancy. The active metabolite is also found in breast milk. Calcipotriene Pregnancy And Lactation Text: The use of this medication during pregnancy or lactation is not recommended as there is insufficient data. Xolair Counseling:  Patient informed of potential adverse effects including but not limited to fever, muscle aches, rash and allergic reactions.  The patient verbalized understanding of the proper use and possible adverse effects of Xolair.  All of the patient's questions and concerns were addressed. Low Dose Naltrexone Pregnancy And Lactation Text: Naltrexone is pregnancy category C.  There have been no adequate and well-controlled studies in pregnant women.  It should be used in pregnancy only if the potential benefit justifies the potential risk to the fetus.   Limited data indicates that naltrexone is minimally excreted into breastmilk. Litfulo Counseling: I discussed with the patient the risks of Litfulo therapy including but not limited to upper respiratory tract infections, shingles, cold sores, and nausea. Live vaccines should be avoided.  This medication has been linked to serious infections; higher rate of mortality; malignancy and lymphoproliferative disorders; major adverse cardiovascular events; thrombosis; gastrointestinal perforations; neutropenia; lymphopenia; anemia; liver enzyme elevations; and lipid elevations. Dapsone Counseling: I discussed with the patient the risks of dapsone including but not limited to hemolytic anemia, agranulocytosis, rashes, methemoglobinemia, kidney failure, peripheral neuropathy, headaches, GI upset, and liver toxicity.  Patients who start dapsone require monitoring including baseline LFTs and weekly CBCs for the first month, then every month thereafter.  The patient verbalized understanding of the proper use and possible adverse effects of dapsone.  All of the patient's questions and concerns were addressed. Terbinafine Pregnancy And Lactation Text: This medication is Pregnancy Category B and is considered safe during pregnancy. It is also excreted in breast milk and breast feeding isn't recommended. Methotrexate Counseling:  Patient counseled regarding adverse effects of methotrexate including but not limited to nausea, vomiting, abnormalities in liver function tests. Patients may develop mouth sores, rash, diarrhea, and abnormalities in blood counts. The patient understands that monitoring is required including LFT's and blood counts.  There is a rare possibility of scarring of the liver and lung problems that can occur when taking methotrexate. Persistent nausea, loss of appetite, pale stools, dark urine, cough, and shortness of breath should be reported immediately. Patient advised to discontinue methotrexate treatment at least three months before attempting to become pregnant.  I discussed the need for folate supplements while taking methotrexate.  These supplements can decrease side effects during methotrexate treatment. The patient verbalized understanding of the proper use and possible adverse effects of methotrexate.  All of the patient's questions and concerns were addressed. Clindamycin Counseling: I counseled the patient regarding use of clindamycin as an antibiotic for prophylactic and/or therapeutic purposes. Clindamycin is active against numerous classes of bacteria, including skin bacteria. Side effects may include nausea, diarrhea, gastrointestinal upset, rash, hives, yeast infections, and in rare cases, colitis. Fluconazole Counseling:  Patient counseled regarding adverse effects of fluconazole including but not limited to headache, diarrhea, nausea, upset stomach, liver function test abnormalities, taste disturbance, and stomach pain.  There is a rare possibility of liver failure that can occur when taking fluconazole.  The patient understands that monitoring of LFTs and kidney function test may be required, especially at baseline. The patient verbalized understanding of the proper use and possible adverse effects of fluconazole.  All of the patient's questions and concerns were addressed. Dutasteride Male Counseling: Dustasteride Counseling:  I discussed with the patient the risks of use of dutasteride including but not limited to decreased libido, decreased ejaculate volume, and gynecomastia. Women who can become pregnant should not handle medication.  All of the patient's questions and concerns were addressed. Include Pregnancy/Lactation Warning?: No Tranexamic Acid Counseling:  Patient advised of the small risk of bleeding problems with tranexamic acid. They were also instructed to call if they developed any nausea, vomiting or diarrhea. All of the patient's questions and concerns were addressed. Mirvaso Pregnancy And Lactation Text: This medication has not been assigned a Pregnancy Risk Category. It is unknown if the medication is excreted in breast milk. Infliximab Counseling:  I discussed with the patient the risks of infliximab including but not limited to myelosuppression, immunosuppression, autoimmune hepatitis, demyelinating diseases, lymphoma, and serious infections.  The patient understands that monitoring is required including a PPD at baseline and must alert us or the primary physician if symptoms of infection or other concerning signs are noted. Aklief counseling:  Patient advised to apply a pea-sized amount only at bedtime and wait 30 minutes after washing their face before applying.  If too drying, patient may add a non-comedogenic moisturizer.  The most commonly reported side effects including irritation, redness, scaling, dryness, stinging, burning, itching, and increased risk of sunburn.  The patient verbalized understanding of the proper use and possible adverse effects of retinoids.  All of the patient's questions and concerns were addressed. Otezla Pregnancy And Lactation Text: This medication is Pregnancy Category C and it isn't known if it is safe during pregnancy. It is unknown if it is excreted in breast milk. Dupixent Pregnancy And Lactation Text: This medication likely crosses the placenta but the risk for the fetus is uncertain. This medication is excreted in breast milk. Rhofade Counseling: Rhofade is a topical medication which can decrease superficial blood flow where applied. Side effects are uncommon and include stinging, redness and allergic reactions. Litfulo Pregnancy And Lactation Text: Based on animal studies, Lifulo may cause embryo-fetal harm when administered to pregnant women.  The medication should not be used in pregnancy.  Breastfeeding is not recommended during treatment. Topical Ketoconazole Counseling: Patient counseled that this medication may cause skin irritation or allergic reactions.  In the event of skin irritation, the patient was advised to reduce the amount of the drug applied or use it less frequently.   The patient verbalized understanding of the proper use and possible adverse effects of ketoconazole.  All of the patient's questions and concerns were addressed. Ivermectin Counseling:  Patient instructed to take medication on an empty stomach with a full glass of water.  Patient informed of potential adverse effects including but not limited to nausea, diarrhea, dizziness, itching, and swelling of the extremities or lymph nodes.  The patient verbalized understanding of the proper use and possible adverse effects of ivermectin.  All of the patient's questions and concerns were addressed. Opioid Counseling: I discussed with the patient the potential side effects of opioids including but not limited to addiction, altered mental status, and depression. I stressed avoiding alcohol, benzodiazepines, muscle relaxants and sleep aids unless specifically okayed by a physician. The patient verbalized understanding of the proper use and possible adverse effects of opioids. All of the patient's questions and concerns were addressed. They were instructed to flush the remaining pills down the toilet if they did not need them for pain. Niacinamide Counseling: I recommended taking niacin or niacinamide, also know as vitamin B3, twice daily. Recent evidence suggests that taking vitamin B3 (500 mg twice daily) can reduce the risk of actinic keratoses and non-melanoma skin cancers. Side effects of vitamin B3 include flushing and headache. Hydroquinone Counseling:  Patient advised that medication may result in skin irritation, lightening (hypopigmentation), dryness, and burning.  In the event of skin irritation, the patient was advised to reduce the amount of the drug applied or use it less frequently.  Rarely, spots that are treated with hydroquinone can become darker (pseudoochronosis).  Should this occur, patient instructed to stop medication and call the office. The patient verbalized understanding of the proper use and possible adverse effects of hydroquinone.  All of the patient's questions and concerns were addressed. Acitretin Counseling:  I discussed with the patient the risks of acitretin including but not limited to hair loss, dry lips/skin/eyes, liver damage, hyperlipidemia, depression/suicidal ideation, photosensitivity.  Serious rare side effects can include but are not limited to pancreatitis, pseudotumor cerebri, bony changes, clot formation/stroke/heart attack.  Patient understands that alcohol is contraindicated since it can result in liver toxicity and significantly prolong the elimination of the drug by many years. Cantharidin Counseling:  I discussed with the patient the risks of Cantharidin including but not limited to pain, redness, burning, itching, and blistering. Xolair Pregnancy And Lactation Text: This medication is Pregnancy Category B and is considered safe during pregnancy. This medication is excreted in breast milk. Quinolones Counseling:  I discussed with the patient the risks of fluoroquinolones including but not limited to GI upset, allergic reaction, drug rash, diarrhea, dizziness, photosensitivity, yeast infections, liver function test abnormalities, tendonitis/tendon rupture. Dapsone Pregnancy And Lactation Text: This medication is Pregnancy Category C and is not considered safe during pregnancy or breast feeding. Spevigo Counseling: I discussed with the patient the risks of Spevigo including but not limited to fatigue, nasuea, vomiting, headache, pruritus, urinary tract infection, an infusion related reactions.  The patient understands that monitoring is required including screening for tuberculosis at baseline and yearly screening thereafter while continuing Spevigo therapy. They should contact us if symptoms of infection or other concerning signs are noted. Methotrexate Pregnancy And Lactation Text: This medication is Pregnancy Category X and is known to cause fetal harm. This medication is excreted in breast milk. Clindamycin Pregnancy And Lactation Text: This medication can be used in pregnancy if certain situations. Clindamycin is also present in breast milk. Aklief Pregnancy And Lactation Text: It is unknown if this medication is safe to use during pregnancy.  It is unknown if this medication is excreted in breast milk.  Breastfeeding women should use the topical cream on the smallest area of the skin for the shortest time needed while breastfeeding.  Do not apply to nipple and areola. Azathioprine Counseling:  I discussed with the patient the risks of azathioprine including but not limited to myelosuppression, immunosuppression, hepatotoxicity, lymphoma, and infections.  The patient understands that monitoring is required including baseline LFTs, Creatinine, possible TPMP genotyping and weekly CBCs for the first month and then every 2 weeks thereafter.  The patient verbalized understanding of the proper use and possible adverse effects of azathioprine.  All of the patient's questions and concerns were addressed. Dutasteride Female Counseling: Dutasteride Counseling:  I discussed with the patient the risks of use of dutasteride including but not limited to decreased libido and sexual dysfunction. Explained the teratogenic nature of the medication and stressed the importance of not getting pregnant during treatment. All of the patient's questions and concerns were addressed. Tranexamic Acid Pregnancy And Lactation Text: It is unknown if this medication is safe during pregnancy or breast feeding. Oxybutynin Counseling:  I discussed with the patient the risks of oxybutynin including but not limited to skin rash, drowsiness, dry mouth, difficulty urinating, and blurred vision. Winlevi Counseling:  I discussed with the patient the risks of topical clascoterone including but not limited to erythema, scaling, itching, and stinging. Patient voiced their understanding. Niacinamide Pregnancy And Lactation Text: These medications are considered safe during pregnancy. Cimetidine Counseling:  I discussed with the patient the risks of Cimetidine including but not limited to gynecomastia, headache, diarrhea, nausea, drowsiness, arrhythmias, pancreatitis, skin rashes, psychosis, bone marrow suppression and kidney toxicity. Opzelura Counseling:  I discussed with the patient the risks of Opzelura including but not limited to nasopharngitis, bronchitis, ear infection, eosinophila, hives, diarrhea, folliculitis, tonsillitis, and rhinorrhea.  Taken orally, this medication has been linked to serious infections; higher rate of mortality; malignancy and lymphoproliferative disorders; major adverse cardiovascular events; thrombosis; thrombocytopenia, anemia, and neutropenia; and lipid elevations. Enbrel Counseling:  I discussed with the patient the risks of etanercept including but not limited to myelosuppression, immunosuppression, autoimmune hepatitis, demyelinating diseases, lymphoma, and infections.  The patient understands that monitoring is required including a PPD at baseline and must alert us or the primary physician if symptoms of infection or other concerning signs are noted. Cantharidin Pregnancy And Lactation Text: This medication has not been proven safe during pregnancy. It is unknown if this medication is excreted in breast milk. Olumiant Counseling: I discussed with the patient the risks of Olumiant therapy including but not limited to upper respiratory tract infections, shingles, cold sores, and nausea. Live vaccines should be avoided.  This medication has been linked to serious infections; higher rate of mortality; malignancy and lymphoproliferative disorders; major adverse cardiovascular events; thrombosis; gastrointestinal perforations; neutropenia; lymphopenia; anemia; liver enzyme elevations; and lipid elevations. Opioid Pregnancy And Lactation Text: These medications can lead to premature delivery and should be avoided during pregnancy. These medications are also present in breast milk in small amounts. Acitretin Pregnancy And Lactation Text: This medication is Pregnancy Category X and should not be given to women who are pregnant or may become pregnant in the future. This medication is excreted in breast milk. Dutasteride Pregnancy And Lactation Text: This medication is absolutely contraindicated in women, especially during pregnancy and breast feeding. Feminization of male fetuses is possible if taking while pregnant. Spevigo Pregnancy And Lactation Text: The risk during pregnancy and breastfeeding is uncertain with this medication. This medication does cross the placenta. It is unknown if this medication is found in breast milk. Solaraze Counseling:  I discussed with the patient the risks of Solaraze including but not limited to erythema, scaling, itching, weeping, crusting, and pain. Gabapentin Counseling: I discussed with the patient the risks of gabapentin including but not limited to dizziness, somnolence, fatigue and ataxia. Prednisone Counseling:  I discussed with the patient the risks of prolonged use of prednisone including but not limited to weight gain, insomnia, osteoporosis, mood changes, diabetes, susceptibility to infection, glaucoma and high blood pressure.  In cases where prednisone use is prolonged, patients should be monitored with blood pressure checks, serum glucose levels and an eye exam.  Additionally, the patient may need to be placed on GI prophylaxis, PCP prophylaxis, and calcium and vitamin D supplementation and/or a bisphosphonate.  The patient verbalized understanding of the proper use and the possible adverse effects of prednisone.  All of the patient's questions and concerns were addressed. Valtrex Counseling: I discussed with the patient the risks of valacyclovir including but not limited to kidney damage, nausea, vomiting and severe allergy.  The patient understands that if the infection seems to be worsening or is not improving, they are to call. 5-Fu Counseling: 5-Fluorouracil Counseling:  I discussed with the patient the risks of 5-fluorouracil including but not limited to erythema, scaling, itching, weeping, crusting, and pain. Doxycycline Counseling:  Patient counseled regarding possible photosensitivity and increased risk for sunburn.  Patient instructed to avoid sunlight, if possible.  When exposed to sunlight, patients should wear protective clothing, sunglasses, and sunscreen.  The patient was instructed to call the office immediately if the following severe adverse effects occur:  hearing changes, easy bruising/bleeding, severe headache, or vision changes.  The patient verbalized understanding of the proper use and possible adverse effects of doxycycline.  All of the patient's questions and concerns were addressed. Azelaic Acid Counseling: Patient counseled that medicine may cause skin irritation and to avoid applying near the eyes.  In the event of skin irritation, the patient was advised to reduce the amount of the drug applied or use it less frequently.   The patient verbalized understanding of the proper use and possible adverse effects of azelaic acid.  All of the patient's questions and concerns were addressed. Griseofulvin Counseling:  I discussed with the patient the risks of griseofulvin including but not limited to photosensitivity, cytopenia, liver damage, nausea/vomiting and severe allergy.  The patient understands that this medication is best absorbed when taken with a fatty meal (e.g., ice cream or french fries). Rituxan Counseling:  I discussed with the patient the risks of Rituxan infusions. Side effects can include infusion reactions, severe drug rashes including mucocutaneous reactions, reactivation of latent hepatitis and other infections and rarely progressive multifocal leukoencephalopathy.  All of the patient's questions and concerns were addressed. Winlevi Pregnancy And Lactation Text: This medication is considered safe during pregnancy and breastfeeding. Erivedge Counseling- I discussed with the patient the risks of Erivedge including but not limited to nausea, vomiting, diarrhea, constipation, weight loss, changes in the sense of taste, decreased appetite, muscle spasms, and hair loss.  The patient verbalized understanding of the proper use and possible adverse effects of Erivedge.  All of the patient's questions and concerns were addressed. Opzelura Pregnancy And Lactation Text: There is insufficient data to evaluate drug-associated risk for major birth defects, miscarriage, or other adverse maternal or fetal outcomes.  There is a pregnancy registry that monitors pregnancy outcomes in pregnant persons exposed to the medication during pregnancy.  It is unknown if this medication is excreted in breast milk.  Do not breastfeed during treatment and for about 4 weeks after the last dose. Topical Metronidazole Counseling: Metronidazole is a topical antibiotic medication. You may experience burning, stinging, redness, or allergic reactions.  Please call our office if you develop any problems from using this medication. Imiquimod Counseling:  I discussed with the patient the risks of imiquimod including but not limited to erythema, scaling, itching, weeping, crusting, and pain.  Patient understands that the inflammatory response to imiquimod is variable from person to person and was educated regarded proper titration schedule.  If flu-like symptoms develop, patient knows to discontinue the medication and contact us. Olumiant Pregnancy And Lactation Text: Based on animal studies, Olumiant may cause embryo-fetal harm when administered to pregnant women.  The medication should not be used in pregnancy.  Breastfeeding is not recommended during treatment. Nsaids Counseling: NSAID Counseling: I discussed with the patient that NSAIDs should be taken with food. Prolonged use of NSAIDs can result in the development of stomach ulcers.  Patient advised to stop taking NSAIDs if abdominal pain occurs.  The patient verbalized understanding of the proper use and possible adverse effects of NSAIDs.  All of the patient's questions and concerns were addressed. Rifampin Counseling: I discussed with the patient the risks of rifampin including but not limited to liver damage, kidney damage, red-orange body fluids, nausea/vomiting and severe allergy. Stelara Counseling:  I discussed with the patient the risks of ustekinumab including but not limited to immunosuppression, malignancy, posterior leukoencephalopathy syndrome, and serious infections.  The patient understands that monitoring is required including a PPD at baseline and must alert us or the primary physician if symptoms of infection or other concerning signs are noted. Griseofulvin Pregnancy And Lactation Text: This medication is Pregnancy Category X and is known to cause serious birth defects. It is unknown if this medication is excreted in breast milk but breast feeding should be avoided. Finasteride Male Counseling: Finasteride Counseling:  I discussed with the patient the risks of use of finasteride including but not limited to decreased libido, decreased ejaculate volume, gynecomastia, and depression. Women should not handle medication.  All of the patient's questions and concerns were addressed. Bexarotene Counseling:  I discussed with the patient the risks of bexarotene including but not limited to hair loss, dry lips/skin/eyes, liver abnormalities, hyperlipidemia, pancreatitis, depression/suicidal ideation, photosensitivity, drug rash/allergic reactions, hypothyroidism, anemia, leukopenia, infection, cataracts, and teratogenicity.  Patient understands that they will need regular blood tests to check lipid profile, liver function tests, white blood cell count, thyroid function tests and pregnancy test if applicable. Doxepin Counseling:  Patient advised that the medication is sedating and not to drive a car after taking this medication. Patient informed of potential adverse effects including but not limited to dry mouth, urinary retention, and blurry vision.  The patient verbalized understanding of the proper use and possible adverse effects of doxepin.  All of the patient's questions and concerns were addressed. Valtrex Pregnancy And Lactation Text: this medication is Pregnancy Category B and is considered safe during pregnancy. This medication is not directly found in breast milk but it's metabolite acyclovir is present. Arava Counseling:  Patient counseled regarding adverse effects of Arava including but not limited to nausea, vomiting, abnormalities in liver function tests. Patients may develop mouth sores, rash, diarrhea, and abnormalities in blood counts. The patient understands that monitoring is required including LFTs and blood counts.  There is a rare possibility of scarring of the liver and lung problems that can occur when taking methotrexate. Persistent nausea, loss of appetite, pale stools, dark urine, cough, and shortness of breath should be reported immediately. Patient advised to discontinue Arava treatment and consult with a physician prior to attempting conception. The patient will have to undergo a treatment to eliminate Arava from the body prior to conception. Doxycycline Pregnancy And Lactation Text: This medication is Pregnancy Category D and not consider safe during pregnancy. It is also excreted in breast milk but is considered safe for shorter treatment courses. Adbry Counseling: I discussed with the patient the risks of tralokinumab including but not limited to eye infection and irritation, cold sores, injection site reactions, worsening of asthma, allergic reactions and increased risk of parasitic infection.  Live vaccines should be avoided while taking tralokinumab. The patient understands that monitoring is required and they must alert us or the primary physician if symptoms of infection or other concerning signs are noted. Rituxan Pregnancy And Lactation Text: This medication is Pregnancy Category C and it isn't know if it is safe during pregnancy. It is unknown if this medication is excreted in breast milk but similar antibodies are known to be excreted. Cellcept Counseling:  I discussed with the patient the risks of mycophenolate mofetil including but not limited to infection/immunosuppression, GI upset, hypokalemia, hypercholesterolemia, bone marrow suppression, lymphoproliferative disorders, malignancy, GI ulceration/bleed/perforation, colitis, interstitial lung disease, kidney failure, progressive multifocal leukoencephalopathy, and birth defects.  The patient understands that monitoring is required including a baseline creatinine and regular CBC testing. In addition, patient must alert us immediately if symptoms of infection or other concerning signs are noted. Solaraze Pregnancy And Lactation Text: This medication is Pregnancy Category B and is considered safe. There is some data to suggest avoiding during the third trimester. It is unknown if this medication is excreted in breast milk. VTAMA Counseling: I discussed with the patient that VTAMA is not for use in the eyes, mouth or mouth. They should call the office if they develop any signs of allergic reactions to VTAMA. The patient verbalized understanding of the proper use and possible adverse effects of VTAMA.  All of the patient's questions and concerns were addressed. Azithromycin Counseling:  I discussed with the patient the risks of azithromycin including but not limited to GI upset, allergic reaction, drug rash, diarrhea, and yeast infections. Picato Counseling:  I discussed with the patient the risks of Picato including but not limited to erythema, scaling, itching, weeping, crusting, and pain. Humira Counseling:  I discussed with the patient the risks of adalimumab including but not limited to myelosuppression, immunosuppression, autoimmune hepatitis, demyelinating diseases, lymphoma, and serious infections.  The patient understands that monitoring is required including a PPD at baseline and must alert us or the primary physician if symptoms of infection or other concerning signs are noted. Propranolol Counseling:  I discussed with the patient the risks of propranolol including but not limited to low heart rate, low blood pressure, low blood sugar, restlessness and increased cold sensitivity. They should call the office if they experience any of these side effects. Topical Metronidazole Pregnancy And Lactation Text: This medication is Pregnancy Category B and considered safe during pregnancy.  It is also considered safe to use while breastfeeding. Drysol Counseling:  I discussed with the patient the risks of drysol/aluminum chloride including but not limited to skin rash, itching, irritation, burning. Nsaids Pregnancy And Lactation Text: These medications are considered safe up to 30 weeks gestation. It is excreted in breast milk. Rinvoq Counseling: I discussed with the patient the risks of Rinvoq therapy including but not limited to upper respiratory tract infections, shingles, cold sores, bronchitis, nausea, cough, fever, acne, and headache. Live vaccines should be avoided.  This medication has been linked to serious infections; higher rate of mortality; malignancy and lymphoproliferative disorders; major adverse cardiovascular events; thrombosis; thrombocytopenia, anemia, and neutropenia; lipid elevations; liver enzyme elevations; and gastrointestinal perforations. Glycopyrrolate Counseling:  I discussed with the patient the risks of glycopyrrolate including but not limited to skin rash, drowsiness, dry mouth, difficulty urinating, and blurred vision. Rifampin Pregnancy And Lactation Text: This medication is Pregnancy Category C and it isn't know if it is safe during pregnancy. It is also excreted in breast milk and should not be used if you are breast feeding. Erythromycin Counseling:  I discussed with the patient the risks of erythromycin including but not limited to GI upset, allergic reaction, drug rash, diarrhea, increase in liver enzymes, and yeast infections. Finasteride Female Counseling: Finasteride Counseling:  I discussed with the patient the risks of use of finasteride including but not limited to decreased libido and sexual dysfunction. Explained the teratogenic nature of the medication and stressed the importance of not getting pregnant during treatment. All of the patient's questions and concerns were addressed. Bexarotene Pregnancy And Lactation Text: This medication is Pregnancy Category X and should not be given to women who are pregnant or may become pregnant. This medication should not be used if you are breast feeding. Itraconazole Counseling:  I discussed with the patient the risks of itraconazole including but not limited to liver damage, nausea/vomiting, neuropathy, and severe allergy.  The patient understands that this medication is best absorbed when taken with acidic beverages such as non-diet cola or ginger ale.  The patient understands that monitoring is required including baseline LFTs and repeat LFTs at intervals.  The patient understands that they are to contact us or the primary physician if concerning signs are noted. Libtayo Counseling- I discussed with the patient the risks of Libtayo including but not limited to nausea, vomiting, diarrhea, and bone or muscle pain.  The patient verbalized understanding of the proper use and possible adverse effects of Libtayo.  All of the patient's questions and concerns were addressed. Adbry Pregnancy And Lactation Text: It is unknown if this medication will adversely affect pregnancy or breast feeding.